# Patient Record
Sex: MALE | Race: WHITE | NOT HISPANIC OR LATINO | URBAN - METROPOLITAN AREA
[De-identification: names, ages, dates, MRNs, and addresses within clinical notes are randomized per-mention and may not be internally consistent; named-entity substitution may affect disease eponyms.]

---

## 2018-03-13 ENCOUNTER — INPATIENT (INPATIENT)
Facility: HOSPITAL | Age: 83
LOS: 9 days | Discharge: HOME | DRG: 222 | End: 2018-03-23
Attending: INTERNAL MEDICINE | Admitting: STUDENT IN AN ORGANIZED HEALTH CARE EDUCATION/TRAINING PROGRAM
Payer: MEDICARE

## 2018-03-13 VITALS
HEART RATE: 54 BPM | OXYGEN SATURATION: 95 % | WEIGHT: 169.09 LBS | RESPIRATION RATE: 29 BRPM | TEMPERATURE: 98 F | SYSTOLIC BLOOD PRESSURE: 198 MMHG | DIASTOLIC BLOOD PRESSURE: 63 MMHG

## 2018-03-13 DIAGNOSIS — I50.9 HEART FAILURE, UNSPECIFIED: ICD-10-CM

## 2018-03-13 PROCEDURE — 71045 X-RAY EXAM CHEST 1 VIEW: CPT | Mod: 26

## 2018-03-13 PROCEDURE — 99223 1ST HOSP IP/OBS HIGH 75: CPT | Mod: GC

## 2018-03-13 RX ORDER — MAGNESIUM SULFATE 500 MG/ML
1 VIAL (ML) INJECTION ONCE
Qty: 0 | Refills: 0 | Status: COMPLETED | OUTPATIENT
Start: 2018-03-13 | End: 2018-03-14

## 2018-03-13 RX ORDER — FUROSEMIDE 40 MG
40 TABLET ORAL
Qty: 0 | Refills: 0 | Status: DISCONTINUED | OUTPATIENT
Start: 2018-03-13 | End: 2018-03-14

## 2018-03-13 RX ORDER — NITROGLYCERIN 6.5 MG
5 CAPSULE, EXTENDED RELEASE ORAL
Qty: 50 | Refills: 0 | Status: DISCONTINUED | OUTPATIENT
Start: 2018-03-13 | End: 2018-03-14

## 2018-03-13 NOTE — H&P ADULT - NSHPPHYSICALEXAM_GEN_ALL_CORE
ICU Vital Signs Last 24 Hrs  T(C): 36.6 (13 Mar 2018 23:05), Max: 36.6 (13 Mar 2018 23:05)  T(F): 97.9 (13 Mar 2018 23:05), Max: 97.9 (13 Mar 2018 23:05)  HR: 40 (14 Mar 2018 00:15) (40 - 54)  BP: 165/63 (14 Mar 2018 00:15) (165/63 - 198/63)  BP(mean): 102 (14 Mar 2018 00:15) (102 - 128)  ABP: --  ABP(mean): --  RR: 25 (14 Mar 2018 00:15) (25 - 29)  SpO2: 96% (14 Mar 2018 00:15) (95% - 97%)      PHYSICAL EXAM:    GENERAL: Comfortable, no acute distress, on BiPAP  HEAD:  Normocephalic, atraumatic  EYES: EOMI, PERRLA  HEENT: Moist mucous membranes  NECK: Supple, No JVD  NERVOUS SYSTEM:  Alert & Oriented X3, Motor Strength 5/5 B/L upper and lower extremities  CHEST/LUNG: Bilateral crackles   HEART: Bradycardic, + systolic murmur   ABDOMEN: Soft, Nontender, Nondistended, Bowel sounds present  EXTREMITIES:   No clubbing, cyanosis, + 2+ LE edema  MUSCULOSKELETAL: No muscle tenderness, no joint tenderness  SKIN:  warm and dry, no rash

## 2018-03-13 NOTE — H&P ADULT - NSHPSOCIALHISTORY_GEN_ALL_CORE
Social History:    Marital Status:  (  X )    (   ) Single    (   )    (  )   Lives with: (  ) alone  (  ) children   ( X ) spouse   (  ) parents  (  ) other    Substance Use (street drugs): ( X ) never used  (  ) other:  Tobacco Usage:  (   ) never smoked   (  X ) former smoker- >40 pack years, quit 12 years ago   Alcohol Usage: Social drinker

## 2018-03-13 NOTE — H&P ADULT - ASSESSMENT
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     #Neuro- A&Ox3, Nigerian speaking only     #CV- Initially hypertensive to SBP 200s, on nitro gtt   - sustained bradycardia in 30s-40s in atrial flutter with junctional escape rhythm,   - EP eval for PPM, no need for TVP at this time   - volume overloaded, improve s/p fernández placement and lasix, diuresed 1L in ambulance, put on lasix 40mg BID  - Jose Manuel mildly elevated likely 2/2 demand ischemia, will cont to trend     #Pulm- Hypoxic respiratory failure in setting of pulmonary edema 2/2 ADHF  - on BiPAP  - given 125mg solumedrol in ED    #GI- NPO while on BiPAP     #Renal- Cr 1.48 w/ unknown baseline, possible obstruction that improved w/ fernández placement vs cardio-renal   - cont to monitor     #Electrolytes- Gave 1gr mag on admission 2/2 possible torsades in transit, otherwise stable  - cont to check electrolytes Q8 while diuresing     #ID- WBC 13, but signs of active infection at this time   - will cont to monitor off abx for now     #Endocrine- DM2, put on ISS    #PPx- HSQ 83 y/o M w/ PMHx of DM2, HTN, R sided endarterectomy (7/2017) c/p bleed, 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     #Neuro- A&Ox3, Bhutanese speaking only     #CV- Initially hypertensive to SBP 200s, on nitro gtt   - sustained bradycardia in 30s-40s in atrial flutter with junctional escape rhythm,   - EP eval for PPM, no need for TVP at this time   - volume overloaded, improve s/p fernández placement and lasix, diuresed 1L in ambulance, put on lasix 40mg BID  - Jose Manuel mildly elevated likely 2/2 demand ischemia, will cont to trend     #Pulm- Hypoxic respiratory failure in setting of pulmonary edema 2/2 ADHF  - on BiPAP  - given 125mg solumedrol in ED    #GI- NPO while on BiPAP     #Renal- Cr 1.48 w/ unknown baseline, possible obstruction that improved w/ fernández placement vs cardio-renal   - cont to monitor     #Electrolytes- Gave 1gr mag on admission 2/2 possible torsades in transit, otherwise stable  - cont to check electrolytes Q8 while diuresing     #ID- WBC 13, but signs of active infection at this time   - will cont to monitor off abx for now     #Endocrine- DM2, put on ISS    #Glaucoma- c/w eye drops, holding timolol gtts 2/2 bradycardia, Lotemax gtts unavailable can bring from home     #PPx- HSQ 81 y/o M w/ PMHx of DM2, HTN, R sided endarterectomy (7/2017) c/p bleed, 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 for ADHF and PPM evaluation for atrial flutter with junctional escape rhythm.     #Neuro- A&Ox3, Swazi speaking only     #CV- Initially hypertensive to SBP 200s, on nitro gtt   - sustained bradycardia in 30s-40s in atrial flutter with junctional escape rhythm,   - EP eval for PPM, no need for TVP at this time   - volume overloaded, improve s/p fernández placement and lasix, diuresed 1L in ambulance, gave additional 60mg tonight then put on lasix 40mg BID  - Jose Manuel mildly elevated likely 2/2 demand ischemia, will cont to trend   - holding home PO meds while on BiPAP    #Pulm- Hypoxic respiratory failure in setting of pulmonary edema 2/2 ADHF  - on BiPAP  - given 125mg solumedrol in ED    #GI- NPO while on BiPAP     #Renal- Cr 1.48 w/ unknown baseline, possible obstruction that improved w/ fernández placement vs cardio-renal   - cont to monitor     #Electrolytes- Gave 1gr mag on admission 2/2 possible torsades in transit, otherwise stable  - cont to check electrolytes Q8 while diuresing     #ID- WBC 13, but signs of active infection at this time   - will cont to monitor off abx for now     #Endocrine- DM2, put on ISS    #Glaucoma- c/w eye drops, holding timolol gtts 2/2 bradycardia, Lotemax gtts unavailable can bring from home     #PPx- HSQ

## 2018-03-13 NOTE — H&P ADULT - PMH
Atrial flutter, unspecified type    Essential hypertension    Type 2 diabetes mellitus without complication, without long-term current use of insulin

## 2018-03-13 NOTE — H&P ADULT - NSHPLABSRESULTS_GEN_ALL_CORE
10.7   13.2  )-----------( 355      ( 13 Mar 2018 23:50 )             33.9       03-13    142  |  106  |  28<H>  ----------------------------<  194<H>  4.4   |  21<L>  |  1.48<H>    Ca    9.4      13 Mar 2018 23:50  Phos  4.7     03-13  Mg     2.0     03-13    TPro  8.0  /  Alb  3.8  /  TBili  0.5  /  DBili  x   /  AST  20  /  ALT  19  /  AlkPhos  157<H>  03-13          PT/INR - ( 13 Mar 2018 23:50 )   PT: 13.3 sec;   INR: 1.23 ratio         PTT - ( 13 Mar 2018 23:50 )  PTT:33.9 sec    Lactate Trend  03-13 @ 23:50 Lactate:1.3       CARDIAC MARKERS ( 13 Mar 2018 23:50 )  x     / 0.10 ng/mL / 38 U/L / x     / 3.1 ng/mL      EKG- 10.7   13.2  )-----------( 355      ( 13 Mar 2018 23:50 )             33.9       03-13    142  |  106  |  28<H>  ----------------------------<  194<H>  4.4   |  21<L>  |  1.48<H>    Ca    9.4      13 Mar 2018 23:50  Phos  4.7     03-13  Mg     2.0     03-13    TPro  8.0  /  Alb  3.8  /  TBili  0.5  /  DBili  x   /  AST  20  /  ALT  19  /  AlkPhos  157<H>  03-13          PT/INR - ( 13 Mar 2018 23:50 )   PT: 13.3 sec;   INR: 1.23 ratio         PTT - ( 13 Mar 2018 23:50 )  PTT:33.9 sec    Lactate Trend  03-13 @ 23:50 Lactate:1.3       CARDIAC MARKERS ( 13 Mar 2018 23:50 )  x     / 0.10 ng/mL / 38 U/L / x     / 3.1 ng/mL      EKG- atrial flutter with junctional escape rhythm, HR 50, nl axis, occasional PVCs

## 2018-03-13 NOTE — H&P ADULT - HISTORY OF PRESENT ILLNESS
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   Patient was placed on BiPAP at OSH w/ ABG showing PaO2 80 on CPAP 100%.   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     Patient was placed on BiPAP at OSH w/ ABG showing PaO2 80 on CPAP 100%.   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   Wife at bedside provided the history.   Patient was very independent and in a good state of health until he had the carotid endarterectomy in July last year which was complicated by some type of bleeding resulting in a month long hospitalization at [place]. Since the event he has had about 2-3 hospitalizations from other complications followed by rehab stays.  More recently, the patient has been doing better and living at home with his wife. Wife reports he is able to walk around the house without difficulty and mainly only needs help changing.   This morning, he woke up feeling very short of breath so they called EMS and he was brought to [facility]  Patient was placed on BiPAP at OSH w/ ABG showing PaO2 80 on CPAP 100%.   Given Duoneb, lasix 60mg IV, solumedrol 125mg     Wife reports he denied any F/C, CP, N/V, abd pain, cough, headache or dizziness.   Patient has chronic LE edema since his prior hospitalizations.   Wife also reports a large amount of urine come out when they place a fernández in the ED.     Patient had persistent bradycardia and was transferred to [facility] for possible PPM.     4/2017- stress test neg for ischemia, at that time in NSR w/ RBBB  3/2017- echo showed normal LV function w/ moderate concentric hypertrophy

## 2018-03-14 DIAGNOSIS — Z98.890 OTHER SPECIFIED POSTPROCEDURAL STATES: Chronic | ICD-10-CM

## 2018-03-14 DIAGNOSIS — I44.2 ATRIOVENTRICULAR BLOCK, COMPLETE: ICD-10-CM

## 2018-03-14 DIAGNOSIS — I48.92 UNSPECIFIED ATRIAL FLUTTER: ICD-10-CM

## 2018-03-14 DIAGNOSIS — K63.5 POLYP OF COLON: Chronic | ICD-10-CM

## 2018-03-14 DIAGNOSIS — I47.2 VENTRICULAR TACHYCARDIA: ICD-10-CM

## 2018-03-14 LAB
ALBUMIN SERPL ELPH-MCNC: 3.2 G/DL — LOW (ref 3.3–5)
ALBUMIN SERPL ELPH-MCNC: 3.8 G/DL — SIGNIFICANT CHANGE UP (ref 3.3–5)
ALP SERPL-CCNC: 140 U/L — HIGH (ref 40–120)
ALP SERPL-CCNC: 157 U/L — HIGH (ref 40–120)
ALT FLD-CCNC: 15 U/L RC — SIGNIFICANT CHANGE UP (ref 10–45)
ALT FLD-CCNC: 19 U/L RC — SIGNIFICANT CHANGE UP (ref 10–45)
ANION GAP SERPL CALC-SCNC: 15 MMOL/L — SIGNIFICANT CHANGE UP (ref 5–17)
ANION GAP SERPL CALC-SCNC: 18 MMOL/L — HIGH (ref 5–17)
ANION GAP SERPL CALC-SCNC: 19 MMOL/L — HIGH (ref 5–17)
APTT BLD: 33.9 SEC — SIGNIFICANT CHANGE UP (ref 27.5–37.4)
AST SERPL-CCNC: 15 U/L — SIGNIFICANT CHANGE UP (ref 10–40)
AST SERPL-CCNC: 20 U/L — SIGNIFICANT CHANGE UP (ref 10–40)
BASOPHILS # BLD AUTO: 0 K/UL — SIGNIFICANT CHANGE UP (ref 0–0.2)
BASOPHILS # BLD AUTO: 0 K/UL — SIGNIFICANT CHANGE UP (ref 0–0.2)
BASOPHILS NFR BLD AUTO: 0.1 % — SIGNIFICANT CHANGE UP (ref 0–2)
BASOPHILS NFR BLD AUTO: 0.2 % — SIGNIFICANT CHANGE UP (ref 0–2)
BILIRUB SERPL-MCNC: 0.4 MG/DL — SIGNIFICANT CHANGE UP (ref 0.2–1.2)
BILIRUB SERPL-MCNC: 0.5 MG/DL — SIGNIFICANT CHANGE UP (ref 0.2–1.2)
BLD GP AB SCN SERPL QL: POSITIVE — SIGNIFICANT CHANGE UP
BLD GP AB SCN SERPL QL: POSITIVE — SIGNIFICANT CHANGE UP
BUN SERPL-MCNC: 28 MG/DL — HIGH (ref 7–23)
BUN SERPL-MCNC: 32 MG/DL — HIGH (ref 7–23)
BUN SERPL-MCNC: 39 MG/DL — HIGH (ref 7–23)
CALCIUM SERPL-MCNC: 9.1 MG/DL — SIGNIFICANT CHANGE UP (ref 8.4–10.5)
CALCIUM SERPL-MCNC: 9.1 MG/DL — SIGNIFICANT CHANGE UP (ref 8.4–10.5)
CALCIUM SERPL-MCNC: 9.4 MG/DL — SIGNIFICANT CHANGE UP (ref 8.4–10.5)
CHLORIDE SERPL-SCNC: 104 MMOL/L — SIGNIFICANT CHANGE UP (ref 96–108)
CHLORIDE SERPL-SCNC: 106 MMOL/L — SIGNIFICANT CHANGE UP (ref 96–108)
CHLORIDE SERPL-SCNC: 107 MMOL/L — SIGNIFICANT CHANGE UP (ref 96–108)
CHOLEST SERPL-MCNC: 94 MG/DL — SIGNIFICANT CHANGE UP (ref 10–199)
CK MB CFR SERPL CALC: 3.1 NG/ML — SIGNIFICANT CHANGE UP (ref 0–6.7)
CK SERPL-CCNC: 38 U/L — SIGNIFICANT CHANGE UP (ref 30–200)
CO2 SERPL-SCNC: 18 MMOL/L — LOW (ref 22–31)
CO2 SERPL-SCNC: 18 MMOL/L — LOW (ref 22–31)
CO2 SERPL-SCNC: 21 MMOL/L — LOW (ref 22–31)
CREAT SERPL-MCNC: 1.48 MG/DL — HIGH (ref 0.5–1.3)
CREAT SERPL-MCNC: 1.58 MG/DL — HIGH (ref 0.5–1.3)
CREAT SERPL-MCNC: 1.61 MG/DL — HIGH (ref 0.5–1.3)
EOSINOPHIL # BLD AUTO: 0 K/UL — SIGNIFICANT CHANGE UP (ref 0–0.5)
EOSINOPHIL # BLD AUTO: 0 K/UL — SIGNIFICANT CHANGE UP (ref 0–0.5)
EOSINOPHIL NFR BLD AUTO: 0 % — SIGNIFICANT CHANGE UP (ref 0–6)
EOSINOPHIL NFR BLD AUTO: 0.3 % — SIGNIFICANT CHANGE UP (ref 0–6)
GAS PNL BLDA: SIGNIFICANT CHANGE UP
GAS PNL BLDA: SIGNIFICANT CHANGE UP
GLUCOSE BLDC GLUCOMTR-MCNC: 188 MG/DL — HIGH (ref 70–99)
GLUCOSE BLDC GLUCOMTR-MCNC: 220 MG/DL — HIGH (ref 70–99)
GLUCOSE SERPL-MCNC: 194 MG/DL — HIGH (ref 70–99)
GLUCOSE SERPL-MCNC: 212 MG/DL — HIGH (ref 70–99)
GLUCOSE SERPL-MCNC: 236 MG/DL — HIGH (ref 70–99)
HBA1C BLD-MCNC: 5.9 % — HIGH (ref 4–5.6)
HCT VFR BLD CALC: 33.4 % — LOW (ref 39–50)
HCT VFR BLD CALC: 33.9 % — LOW (ref 39–50)
HDLC SERPL-MCNC: 39 MG/DL — LOW (ref 40–125)
HGB BLD-MCNC: 10.7 G/DL — LOW (ref 13–17)
HGB BLD-MCNC: 10.9 G/DL — LOW (ref 13–17)
INR BLD: 1.23 RATIO — HIGH (ref 0.88–1.16)
LACTATE SERPL-SCNC: 1.3 MMOL/L — SIGNIFICANT CHANGE UP (ref 0.7–2)
LIPID PNL WITH DIRECT LDL SERPL: 42 MG/DL — SIGNIFICANT CHANGE UP
LYMPHOCYTES # BLD AUTO: 0.9 K/UL — LOW (ref 1–3.3)
LYMPHOCYTES # BLD AUTO: 1.1 K/UL — SIGNIFICANT CHANGE UP (ref 1–3.3)
LYMPHOCYTES # BLD AUTO: 6.6 % — LOW (ref 13–44)
LYMPHOCYTES # BLD AUTO: 9.6 % — LOW (ref 13–44)
MAGNESIUM SERPL-MCNC: 2 MG/DL — SIGNIFICANT CHANGE UP (ref 1.6–2.6)
MAGNESIUM SERPL-MCNC: 2.2 MG/DL — SIGNIFICANT CHANGE UP (ref 1.6–2.6)
MAGNESIUM SERPL-MCNC: 2.3 MG/DL — SIGNIFICANT CHANGE UP (ref 1.6–2.6)
MCHC RBC-ENTMCNC: 26.9 PG — LOW (ref 27–34)
MCHC RBC-ENTMCNC: 27.9 PG — SIGNIFICANT CHANGE UP (ref 27–34)
MCHC RBC-ENTMCNC: 31.5 GM/DL — LOW (ref 32–36)
MCHC RBC-ENTMCNC: 32.8 GM/DL — SIGNIFICANT CHANGE UP (ref 32–36)
MCV RBC AUTO: 85.2 FL — SIGNIFICANT CHANGE UP (ref 80–100)
MCV RBC AUTO: 85.3 FL — SIGNIFICANT CHANGE UP (ref 80–100)
MONOCYTES # BLD AUTO: 0.2 K/UL — SIGNIFICANT CHANGE UP (ref 0–0.9)
MONOCYTES # BLD AUTO: 0.2 K/UL — SIGNIFICANT CHANGE UP (ref 0–0.9)
MONOCYTES NFR BLD AUTO: 1.6 % — LOW (ref 2–14)
MONOCYTES NFR BLD AUTO: 1.9 % — LOW (ref 2–14)
NEUTROPHILS # BLD AUTO: 12 K/UL — HIGH (ref 1.8–7.4)
NEUTROPHILS # BLD AUTO: 9.8 K/UL — HIGH (ref 1.8–7.4)
NEUTROPHILS NFR BLD AUTO: 88.3 % — HIGH (ref 43–77)
NEUTROPHILS NFR BLD AUTO: 91.4 % — HIGH (ref 43–77)
NT-PROBNP SERPL-SCNC: 8615 PG/ML — HIGH (ref 0–300)
PHOSPHATE SERPL-MCNC: 4.7 MG/DL — HIGH (ref 2.5–4.5)
PHOSPHATE SERPL-MCNC: 6.1 MG/DL — HIGH (ref 2.5–4.5)
PLATELET # BLD AUTO: 355 K/UL — SIGNIFICANT CHANGE UP (ref 150–400)
PLATELET # BLD AUTO: 415 K/UL — HIGH (ref 150–400)
POTASSIUM SERPL-MCNC: 4.2 MMOL/L — SIGNIFICANT CHANGE UP (ref 3.5–5.3)
POTASSIUM SERPL-MCNC: 4.4 MMOL/L — SIGNIFICANT CHANGE UP (ref 3.5–5.3)
POTASSIUM SERPL-MCNC: 4.7 MMOL/L — SIGNIFICANT CHANGE UP (ref 3.5–5.3)
POTASSIUM SERPL-SCNC: 4.2 MMOL/L — SIGNIFICANT CHANGE UP (ref 3.5–5.3)
POTASSIUM SERPL-SCNC: 4.4 MMOL/L — SIGNIFICANT CHANGE UP (ref 3.5–5.3)
POTASSIUM SERPL-SCNC: 4.7 MMOL/L — SIGNIFICANT CHANGE UP (ref 3.5–5.3)
PROT SERPL-MCNC: 7.2 G/DL — SIGNIFICANT CHANGE UP (ref 6–8.3)
PROT SERPL-MCNC: 8 G/DL — SIGNIFICANT CHANGE UP (ref 6–8.3)
PROTHROM AB SERPL-ACNC: 13.3 SEC — HIGH (ref 9.8–12.7)
RBC # BLD: 3.91 M/UL — LOW (ref 4.2–5.8)
RBC # BLD: 3.98 M/UL — LOW (ref 4.2–5.8)
RBC # FLD: 16.2 % — HIGH (ref 10.3–14.5)
RBC # FLD: 16.2 % — HIGH (ref 10.3–14.5)
RH IG SCN BLD-IMP: POSITIVE — SIGNIFICANT CHANGE UP
RH IG SCN BLD-IMP: POSITIVE — SIGNIFICANT CHANGE UP
SODIUM SERPL-SCNC: 141 MMOL/L — SIGNIFICANT CHANGE UP (ref 135–145)
SODIUM SERPL-SCNC: 142 MMOL/L — SIGNIFICANT CHANGE UP (ref 135–145)
SODIUM SERPL-SCNC: 143 MMOL/L — SIGNIFICANT CHANGE UP (ref 135–145)
TOTAL CHOLESTEROL/HDL RATIO MEASUREMENT: 2.4 RATIO — LOW (ref 3.4–9.6)
TRIGL SERPL-MCNC: 63 MG/DL — SIGNIFICANT CHANGE UP (ref 10–149)
TROPONIN T SERPL-MCNC: 0.1 NG/ML — HIGH (ref 0–0.06)
TSH SERPL-MCNC: 1.05 UIU/ML — SIGNIFICANT CHANGE UP (ref 0.27–4.2)
WBC # BLD: 11.1 K/UL — HIGH (ref 3.8–10.5)
WBC # BLD: 13.2 K/UL — HIGH (ref 3.8–10.5)
WBC # FLD AUTO: 11.1 K/UL — HIGH (ref 3.8–10.5)
WBC # FLD AUTO: 13.2 K/UL — HIGH (ref 3.8–10.5)

## 2018-03-14 PROCEDURE — 99233 SBSQ HOSP IP/OBS HIGH 50: CPT | Mod: GC

## 2018-03-14 PROCEDURE — 93010 ELECTROCARDIOGRAM REPORT: CPT

## 2018-03-14 PROCEDURE — 71045 X-RAY EXAM CHEST 1 VIEW: CPT | Mod: 26

## 2018-03-14 PROCEDURE — 93306 TTE W/DOPPLER COMPLETE: CPT | Mod: 26

## 2018-03-14 RX ORDER — GLUCAGON INJECTION, SOLUTION 0.5 MG/.1ML
1 INJECTION, SOLUTION SUBCUTANEOUS ONCE
Qty: 0 | Refills: 0 | Status: DISCONTINUED | OUTPATIENT
Start: 2018-03-14 | End: 2018-03-23

## 2018-03-14 RX ORDER — CILOSTAZOL 100 MG/1
100 TABLET ORAL
Qty: 0 | Refills: 0 | Status: DISCONTINUED | OUTPATIENT
Start: 2018-03-14 | End: 2018-03-14

## 2018-03-14 RX ORDER — DEXTROSE 50 % IN WATER 50 %
1 SYRINGE (ML) INTRAVENOUS ONCE
Qty: 0 | Refills: 0 | Status: DISCONTINUED | OUTPATIENT
Start: 2018-03-14 | End: 2018-03-23

## 2018-03-14 RX ORDER — DORZOLAMIDE HYDROCHLORIDE 20 MG/ML
1 SOLUTION/ DROPS OPHTHALMIC THREE TIMES A DAY
Qty: 0 | Refills: 0 | Status: DISCONTINUED | OUTPATIENT
Start: 2018-03-14 | End: 2018-03-14

## 2018-03-14 RX ORDER — BRIMONIDINE TARTRATE 2 MG/MG
1 SOLUTION/ DROPS OPHTHALMIC THREE TIMES A DAY
Qty: 0 | Refills: 0 | Status: DISCONTINUED | OUTPATIENT
Start: 2018-03-14 | End: 2018-03-14

## 2018-03-14 RX ORDER — INSULIN LISPRO 100/ML
VIAL (ML) SUBCUTANEOUS EVERY 6 HOURS
Qty: 0 | Refills: 0 | Status: DISCONTINUED | OUTPATIENT
Start: 2018-03-14 | End: 2018-03-14

## 2018-03-14 RX ORDER — HEPARIN SODIUM 5000 [USP'U]/ML
5000 INJECTION INTRAVENOUS; SUBCUTANEOUS EVERY 12 HOURS
Qty: 0 | Refills: 0 | Status: DISCONTINUED | OUTPATIENT
Start: 2018-03-14 | End: 2018-03-14

## 2018-03-14 RX ORDER — IPRATROPIUM/ALBUTEROL SULFATE 18-103MCG
3 AEROSOL WITH ADAPTER (GRAM) INHALATION ONCE
Qty: 0 | Refills: 0 | Status: COMPLETED | OUTPATIENT
Start: 2018-03-14 | End: 2018-03-14

## 2018-03-14 RX ORDER — BACITRACIN 500 [USP'U]/G
1 OINTMENT OPHTHALMIC DAILY
Qty: 0 | Refills: 0 | Status: DISCONTINUED | OUTPATIENT
Start: 2018-03-14 | End: 2018-03-23

## 2018-03-14 RX ORDER — INSULIN LISPRO 100/ML
VIAL (ML) SUBCUTANEOUS
Qty: 0 | Refills: 0 | Status: DISCONTINUED | OUTPATIENT
Start: 2018-03-14 | End: 2018-03-23

## 2018-03-14 RX ORDER — DEXTROSE 50 % IN WATER 50 %
12.5 SYRINGE (ML) INTRAVENOUS ONCE
Qty: 0 | Refills: 0 | Status: DISCONTINUED | OUTPATIENT
Start: 2018-03-14 | End: 2018-03-23

## 2018-03-14 RX ORDER — SODIUM CHLORIDE 9 MG/ML
1000 INJECTION, SOLUTION INTRAVENOUS
Qty: 0 | Refills: 0 | Status: DISCONTINUED | OUTPATIENT
Start: 2018-03-14 | End: 2018-03-23

## 2018-03-14 RX ORDER — DEXTROSE 50 % IN WATER 50 %
25 SYRINGE (ML) INTRAVENOUS ONCE
Qty: 0 | Refills: 0 | Status: DISCONTINUED | OUTPATIENT
Start: 2018-03-14 | End: 2018-03-23

## 2018-03-14 RX ORDER — DORZOLAMIDE HYDROCHLORIDE 20 MG/ML
1 SOLUTION/ DROPS OPHTHALMIC
Qty: 0 | Refills: 0 | Status: DISCONTINUED | OUTPATIENT
Start: 2018-03-14 | End: 2018-03-15

## 2018-03-14 RX ORDER — FUROSEMIDE 40 MG
40 TABLET ORAL
Qty: 0 | Refills: 0 | Status: DISCONTINUED | OUTPATIENT
Start: 2018-03-14 | End: 2018-03-19

## 2018-03-14 RX ORDER — CLOPIDOGREL BISULFATE 75 MG/1
75 TABLET, FILM COATED ORAL DAILY
Qty: 0 | Refills: 0 | Status: DISCONTINUED | OUTPATIENT
Start: 2018-03-14 | End: 2018-03-14

## 2018-03-14 RX ORDER — TAMSULOSIN HYDROCHLORIDE 0.4 MG/1
0.4 CAPSULE ORAL DAILY
Qty: 0 | Refills: 0 | Status: DISCONTINUED | OUTPATIENT
Start: 2018-03-14 | End: 2018-03-23

## 2018-03-14 RX ORDER — ATORVASTATIN CALCIUM 80 MG/1
20 TABLET, FILM COATED ORAL AT BEDTIME
Qty: 0 | Refills: 0 | Status: DISCONTINUED | OUTPATIENT
Start: 2018-03-14 | End: 2018-03-16

## 2018-03-14 RX ORDER — GLUCAGON INJECTION, SOLUTION 0.5 MG/.1ML
1 INJECTION, SOLUTION SUBCUTANEOUS ONCE
Qty: 0 | Refills: 0 | Status: DISCONTINUED | OUTPATIENT
Start: 2018-03-14 | End: 2018-03-14

## 2018-03-14 RX ORDER — IPRATROPIUM/ALBUTEROL SULFATE 18-103MCG
3 AEROSOL WITH ADAPTER (GRAM) INHALATION EVERY 6 HOURS
Qty: 0 | Refills: 0 | Status: DISCONTINUED | OUTPATIENT
Start: 2018-03-14 | End: 2018-03-23

## 2018-03-14 RX ORDER — FUROSEMIDE 40 MG
60 TABLET ORAL ONCE
Qty: 0 | Refills: 0 | Status: COMPLETED | OUTPATIENT
Start: 2018-03-14 | End: 2018-03-14

## 2018-03-14 RX ORDER — INSULIN LISPRO 100/ML
VIAL (ML) SUBCUTANEOUS AT BEDTIME
Qty: 0 | Refills: 0 | Status: DISCONTINUED | OUTPATIENT
Start: 2018-03-14 | End: 2018-03-23

## 2018-03-14 RX ORDER — DEXTROSE 50 % IN WATER 50 %
1 SYRINGE (ML) INTRAVENOUS ONCE
Qty: 0 | Refills: 0 | Status: DISCONTINUED | OUTPATIENT
Start: 2018-03-14 | End: 2018-03-14

## 2018-03-14 RX ORDER — DEXTROSE 50 % IN WATER 50 %
12.5 SYRINGE (ML) INTRAVENOUS ONCE
Qty: 0 | Refills: 0 | Status: DISCONTINUED | OUTPATIENT
Start: 2018-03-14 | End: 2018-03-14

## 2018-03-14 RX ORDER — SODIUM CHLORIDE 9 MG/ML
1000 INJECTION, SOLUTION INTRAVENOUS
Qty: 0 | Refills: 0 | Status: DISCONTINUED | OUTPATIENT
Start: 2018-03-14 | End: 2018-03-14

## 2018-03-14 RX ORDER — DEXTROSE 50 % IN WATER 50 %
25 SYRINGE (ML) INTRAVENOUS ONCE
Qty: 0 | Refills: 0 | Status: DISCONTINUED | OUTPATIENT
Start: 2018-03-14 | End: 2018-03-14

## 2018-03-14 RX ORDER — BRIMONIDINE TARTRATE 2 MG/MG
1 SOLUTION/ DROPS OPHTHALMIC
Qty: 0 | Refills: 0 | Status: DISCONTINUED | OUTPATIENT
Start: 2018-03-14 | End: 2018-03-23

## 2018-03-14 RX ORDER — CARVEDILOL PHOSPHATE 80 MG/1
1 CAPSULE, EXTENDED RELEASE ORAL
Qty: 0 | Refills: 0 | COMMUNITY

## 2018-03-14 RX ORDER — LOTEPREDNOL ETABONATE 2 MG/ML
1 SUSPENSION/ DROPS OPHTHALMIC
Qty: 0 | Refills: 0 | Status: DISCONTINUED | OUTPATIENT
Start: 2018-03-14 | End: 2018-03-23

## 2018-03-14 RX ADMIN — Medication 100 GRAM(S): at 00:06

## 2018-03-14 RX ADMIN — LOTEPREDNOL ETABONATE 1 DROP(S): 2 SUSPENSION/ DROPS OPHTHALMIC at 17:11

## 2018-03-14 RX ADMIN — BRIMONIDINE TARTRATE 1 DROP(S): 2 SOLUTION/ DROPS OPHTHALMIC at 06:22

## 2018-03-14 RX ADMIN — BACITRACIN 1 APPLICATION(S): 500 OINTMENT OPHTHALMIC at 12:02

## 2018-03-14 RX ADMIN — HEPARIN SODIUM 5000 UNIT(S): 5000 INJECTION INTRAVENOUS; SUBCUTANEOUS at 05:42

## 2018-03-14 RX ADMIN — Medication 3 MILLILITER(S): at 06:23

## 2018-03-14 RX ADMIN — TAMSULOSIN HYDROCHLORIDE 0.4 MILLIGRAM(S): 0.4 CAPSULE ORAL at 12:44

## 2018-03-14 RX ADMIN — LOTEPREDNOL ETABONATE 1 DROP(S): 2 SUSPENSION/ DROPS OPHTHALMIC at 06:23

## 2018-03-14 RX ADMIN — Medication 1 DROP(S): at 05:57

## 2018-03-14 RX ADMIN — Medication 60 MILLIGRAM(S): at 01:55

## 2018-03-14 RX ADMIN — CILOSTAZOL 100 MILLIGRAM(S): 100 TABLET ORAL at 05:42

## 2018-03-14 RX ADMIN — Medication 40 MILLIGRAM(S): at 17:23

## 2018-03-14 RX ADMIN — Medication 1 DROP(S): at 12:45

## 2018-03-14 RX ADMIN — DORZOLAMIDE HYDROCHLORIDE 1 DROP(S): 20 SOLUTION/ DROPS OPHTHALMIC at 17:37

## 2018-03-14 RX ADMIN — BRIMONIDINE TARTRATE 1 DROP(S): 2 SOLUTION/ DROPS OPHTHALMIC at 17:15

## 2018-03-14 RX ADMIN — Medication 40 MILLIGRAM(S): at 06:23

## 2018-03-14 RX ADMIN — Medication 1.5 MICROGRAM(S)/MIN: at 00:57

## 2018-03-14 RX ADMIN — ATORVASTATIN CALCIUM 20 MILLIGRAM(S): 80 TABLET, FILM COATED ORAL at 21:02

## 2018-03-14 RX ADMIN — Medication 1 DROP(S): at 21:02

## 2018-03-14 RX ADMIN — DORZOLAMIDE HYDROCHLORIDE 1 DROP(S): 20 SOLUTION/ DROPS OPHTHALMIC at 05:57

## 2018-03-14 RX ADMIN — HEPARIN SODIUM 5000 UNIT(S): 5000 INJECTION INTRAVENOUS; SUBCUTANEOUS at 17:23

## 2018-03-14 NOTE — PROGRESS NOTE ADULT - ASSESSMENT
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     #Neuro- A&Ox3, Ugandan speaking only     #CV- Initially hypertensive to SBP 200s, on nitro gtt   - sustained bradycardia in 30s-40s in atrial flutter with junctional escape rhythm,   - EP eval for PPM, no need for TVP at this time   - volume overloaded, improve s/p fernández placement and lasix, diuresed 1L in ambulance, gave additional 60mg tonight then put on lasix 40mg BID  - Jose Manuel mildly elevated likely 2/2 demand ischemia, will cont to trend   - holding home PO meds while on BiPAP    #Pulm- Hypoxic respiratory failure in setting of pulmonary edema 2/2 ADHF  - on BiPAP  - given 125mg solumedrol in ED    #GI- NPO while on BiPAP     #Renal- Cr 1.48 w/ unknown baseline, possible obstruction that improved w/ fernández placement vs cardio-renal   - cont to monitor     #Electrolytes- Gave 1gr mag on admission 2/2 possible torsades in transit, otherwise stable  - cont to check electrolytes Q8 while diuresing     #ID- WBC 13, but signs of active infection at this time   - will cont to monitor off abx for now     #Endocrine- DM2, put on ISS    #Glaucoma- c/w eye drops, holding timolol gtts 2/2 bradycardia, Lotemax gtts unavailable can bring from home     #PPx- HSQ

## 2018-03-14 NOTE — CHART NOTE - NSCHARTNOTEFT_GEN_A_CORE
====================  CCU MIDNIGHT ROUNDS  ====================    MANI AYALA  [id number]  Patient is a 82y old  Male who presents with a chief complaint of Shortness of breath (13 Mar 2018 23:00)  ====================  SUMMARY:  ====================      ====================  NEW EVENTS:  ====================    MEDICATIONS  (STANDING):  artificial  tears Solution 1 Drop(s) Both EYES three times a day  atorvastatin 20 milliGRAM(s) Oral at bedtime  BACItracin   Ophthalmic Ointment 1 Application(s) Right EYE daily  brimonidine 0.2% Ophthalmic Solution 1 Drop(s) Right EYE two times a day  dextrose 5%. 1000 milliLiter(s) (50 mL/Hr) IV Continuous <Continuous>  dextrose 50% Injectable 12.5 Gram(s) IV Push once  dextrose 50% Injectable 25 Gram(s) IV Push once  dextrose 50% Injectable 25 Gram(s) IV Push once  dorzolamide 2% Ophthalmic Solution 1 Drop(s) Both EYES <User Schedule>  furosemide   Injectable 40 milliGRAM(s) IV Push two times a day  insulin lispro (HumaLOG) corrective regimen sliding scale   SubCutaneous at bedtime  insulin lispro (HumaLOG) corrective regimen sliding scale   SubCutaneous three times a day before meals  loteprednol 0.5% Ophthalmic Suspension 1 Drop(s) Both EYES two times a day  tamsulosin 0.4 milliGRAM(s) Oral daily    MEDICATIONS  (PRN):  ALBUTerol/ipratropium for Nebulization 3 milliLiter(s) Nebulizer every 6 hours PRN Shortness of Breath and/or Wheezing  dextrose Gel 1 Dose(s) Oral once PRN Blood Glucose LESS THAN 70 milliGRAM(s)/deciliter  glucagon  Injectable 1 milliGRAM(s) IntraMuscular once PRN Glucose LESS THAN 70 milligrams/deciliter    ====================  VITALS (Last 12 hrs):  ====================    T(C): 36.9 (03-14-18 @ 21:00), Max: 37.1 (03-14-18 @ 13:30)  T(F): 98.4 (03-14-18 @ 21:00), Max: 98.7 (03-14-18 @ 13:30)  HR: 40 (03-14-18 @ 22:00) (38 - 68)  BP: 164/52 (03-14-18 @ 22:00) (146/57 - 205/55)  BP(mean): 91 (03-14-18 @ 22:00) (71 - 129)  ABP: --  ABP(mean): --  RR: 24 (03-14-18 @ 22:00) (20 - 30)  SpO2: 94% (03-14-18 @ 22:00) (92% - 96%)  Wt(kg): --  CVP(mm Hg): --  CVP(cm H2O): --  CO: --  CI: --  PA: --  PA(mean): --  PCWP: --  SVR: --  PVR: --    I&O's Summary    13 Mar 2018 07:01  -  14 Mar 2018 07:00  --------------------------------------------------------  IN: 93 mL / OUT: 1615 mL / NET: -1522 mL    14 Mar 2018 07:01  -  14 Mar 2018 22:42  --------------------------------------------------------  IN: 840 mL / OUT: 1075 mL / NET: -235 mL    ====================  NEW LABS:  ====================    03-14    141  |  104  |  39<H>  ----------------------------<  236<H>  4.2   |  18<L>  |  1.58<H>    Ca    9.1      14 Mar 2018 12:34  Phos  6.1     03-14  Mg     2.2     03-14    TPro  7.2  /  Alb  3.2<L>  /  TBili  0.4  /  DBili  x   /  AST  15  /  ALT  15  /  AlkPhos  140<H>  03-14    PT/INR - ( 13 Mar 2018 23:50 )   PT: 13.3 sec;   INR: 1.23 ratio       PTT - ( 13 Mar 2018 23:50 )  PTT:33.9 sec  Troponin T, Serum: 0.10 ng/mL <H> (03-13-18 @ 23:50)  Creatine Kinase, Serum: 38 U/L (03-13-18 @ 23:50)    CKMB Units: 3.1 ng/mL (03-13 @ 23:50)    ABG - ( 14 Mar 2018 12:01 )  pH: 7.46  /  pCO2: 28    /  pO2: 123   / HCO3: 20    / Base Excess: -3.0  /  SaO2: 99        ====================  PLAN:  ====================  -       Mel Beltrán CCU NP  Beeper #[id number]  Spectra # [phone]

## 2018-03-14 NOTE — CHART NOTE - NSCHARTNOTEFT_GEN_A_CORE
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   Wife at bedside provided the history.   Patient was very independent and in a good state of health until he had the carotid endarterectomy in July last year which was complicated by some type of bleeding resulting in a month long hospitalization at [facility]. Since the event he has had about 2-3 hospitalizations from other complications followed by rehab stays.  More recently, the patient has been doing better and living at home with his wife. Wife reports he is able to walk around the house without difficulty and mainly only needs help changing.   This morning, he woke up feeling very short of breath so they called EMS and he was brought to [facility]  Patient was placed on BiPAP at OSH w/ ABG showing PaO2 80 on CPAP 100%.   Given Duoneb, lasix 60mg IV, solumedrol 125mg     Wife reports he denied any F/C, CP, N/V, abd pain, cough, headache or dizziness.   Patient has chronic LE edema since his prior hospitalizations.   Wife also reports a large amount of urine come out when they place a fernández in the ED.     Patient had persistent bradycardia and was transferred to [facility] for possible PPM.     4/2017- stress test neg for ischemia, at that time in NSR w/ RBBB  3/2017- echo showed normal LV function w/ moderate concentric hypertrophy (13 Mar 2018 23:00)    ====================        ====================  NEW EVENTS:  EF preserved on TTE. Plan for PPM tomorrow. NPO after midnight.   Neg 1.5L the first night   ====================        ====================  VITALS (Last 12 hrs):  ====================    T(C): 36.9 (03-14-18 @ 21:00), Max: 37.1 (03-14-18 @ 13:30)  HR: 58 (03-14-18 @ 21:00) (38 - 68)  BP: 170/56 (03-14-18 @ 21:00) (146/57 - 205/55)  BP(mean): 104 (03-14-18 @ 21:00) (71 - 129)  ABP: --  ABP(mean): --  RR: 24 (03-14-18 @ 21:00) (20 - 30)  SpO2: 95% (03-14-18 @ 21:00) (92% - 96%)    TELEMETRY:        *BLOOD GAS/ARTERIAL/MIXED/VENOUS  *LACTATE    I&O's Summary    13 Mar 2018 07:01  -  14 Mar 2018 07:00  --------------------------------------------------------  IN: 93 mL / OUT: 1615 mL / NET: -1522 mL    14 Mar 2018 07:01  -  14 Mar 2018 22:04  --------------------------------------------------------  IN: 840 mL / OUT: 1075 mL / NET: -235 mL        ====================  PLAN:  #Neuro- A&Ox3, Bruneian speaking   #CV- Diuresing well, continued bradycardia but HD stable, plan for PPM tomorrow  #Pulm- On NC, breathing comfortably, pulm edema improving   #Renal- Cr stable   #Electrolytes- Stable, monitoring BMPs  #Endocrine- ISS    Stas Santos MD PGY2  [phone] / [id number]   ====================    HEALTH ISSUES - PROBLEM Dx:  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

## 2018-03-14 NOTE — CONSULT NOTE ADULT - SUBJECTIVE AND OBJECTIVE BOX
CHIEF COMPLAINT: 82 year male with shortness of breath, found to be in sustained atrial flutter with complete heart block, episodes of polymorphic VT .      PAST MEDICAL & SURGICAL HISTORY:  Atrial flutter, unspecified type  Type 2 diabetes mellitus without complication, without long-term current use of insulin  Essential hypertension  Polyp of colon, unspecified part of colon, unspecified type  History of carotid endarterectomy      HPI: 82 year male with history of HTN DM hyperlipidemia,  post  carotid endarterctomy, h/o GI bleed with ? resection of polyp. Followed by General cardiology Dr Aneudy Cabrera, and internist, Dr Grace Muller,  seen preoperative early  for carotid endarterectomy and at that time, echo and pharmacologic stress test were negative, subsequently had endarterectomy, complicated by pnuemonia, and had PAF at that time. 2017 had GI bleed and found to have colon polyp.  In 2018 he  presented to Dr Cabrera with palpitations,  holter showing NSR average heart rate 80s first degree AV block and PAF, placed on plavix only due to recent GI bleed. At baseline, patient has no chest pain nor shortness of breath, and can only walk one block due to bilateral leg pain.  No syncope or presyncope, occasional palpitations, maintained on Coreg 3.125 BID    The patient presented to [facility] yesterday with severe shortness of breath, found to have /80, heart rate 40-50. Found to be in sustained atrial flutter with complete heart block, with intermittent narrow and wide complex escape rhythm. Also noted to have up to 10-12 seconds of polymorphic VT. Was placed on Bipap, given lasix 60 IV and transferred urgently to [facility].    This AM , breathing more comfortably, with patient now on nasal cannula. Still in complete heart block,  Creatinine increased 1.4 to now 1.6 Hb stable 11, troponin 0.1 BNP in 8615. No further VT/VF                PREVIOUS DIAGNOSTIC TESTING:      ECHO  FINDINGS:    STRESS  FINDINGS:    CATHETERIZATION  FINDINGS:    ELECTROPHYSIOLOGY STUDY  FINDINGS:    CAROTID ULTRASOUND:  FINDINGS    VENOUS DUPLEX SCAN:  FINDINGS:    CHEST CT PULMONARY ANGIO with IV Contrast:  FINDINGS:  MEDICATIONS  (STANDING):  artificial  tears Solution 1 Drop(s) Both EYES three times a day  atorvastatin 20 milliGRAM(s) Oral at bedtime  BACItracin   Ophthalmic Ointment 1 Application(s) Right EYE daily  brimonidine 0.2% Ophthalmic Solution 1 Drop(s) Right EYE two times a day  cilostazol 100 milliGRAM(s) Oral two times a day  clopidogrel Tablet 75 milliGRAM(s) Oral daily  dorzolamide 2% Ophthalmic Solution 1 Drop(s) Both EYES <User Schedule>  furosemide   Injectable 40 milliGRAM(s) IV Push two times a day  heparin  Injectable 5000 Unit(s) SubCutaneous every 12 hours  loteprednol 0.5% Ophthalmic Suspension 1 Drop(s) Both EYES two times a day  tamsulosin 0.4 milliGRAM(s) Oral daily    MEDICATIONS  (PRN):  ALBUTerol/ipratropium for Nebulization 3 milliLiter(s) Nebulizer every 6 hours PRN Shortness of Breath and/or Wheezing      FAMILY HISTORY:  No pertinent family history in first degree relatives      SOCIAL HISTORY:    CIGARETTES:    ALCOHOL:    REVIEW OF SYSTEMS:    CONSTITUTIONAL: No fever, weight loss, chills, shakes, or fatigue  EYES: No eye pain, visual disturbances, or discharge  ENMT:  No difficulty hearing, tinnitus, vertigo; No sinus or throat pain  NECK: No pain or stiffness  BREASTS: No pain, masses, or nipple discharge  RESPIRATORY: No cough, wheezing, hemoptysis, or shortness of breath  CARDIOVASCULAR: No chest pain,  POSITIVE SHORTNESS OF BREATH, POSITIVE PALPITATIONS, no, dizziness, syncope, paroxysmal nocturnal dyspnea, orthopnea, or arm or leg swelling  GASTROINTESTINAL: No abdominal  or epigastric pain, nausea, vomiting, hematemesis, diarrhea, constipation, melena or bright red blood.  GENITOURINARY: No dysuria, nocturia, hematuria, or urinary incontinence  NEUROLOGICAL: No headaches, memory loss, slurred speech, limb weakness, loss of strength, numbness, or tremors  SKIN: No itching, burning, rashes, or lesions   LYMPH NODES: No enlarged glands  ENDOCRINE: No heat or cold intolerance, or hair loss  MUSCULOSKELETAL: No joint pain or swelling, muscle, back, or extremity pain  PSYCHIATRIC: No depression, anxiety, or difficulty sleeping  HEME/LYMPH: No easy bruising or bleeding gums  ALLERY AND IMMUNOLOGIC: No hives or rash.      Vital Signs Last 24 Hrs  T(C): 36.7 (14 Mar 2018 08:00), Max: 36.7 (14 Mar 2018 05:00)  T(F): 98.1 (14 Mar 2018 08:00), Max: 98.1 (14 Mar 2018 08:00)  HR: 44 (14 Mar 2018 09:00) (36 - 54)  BP: 170/60 (14 Mar 2018 09:00) (96/38 - 198/63)  BP(mean): 103 (14 Mar 2018 09:00) (61 - 659)  RR: 24 (14 Mar 2018 09:00) (13 - 30)  SpO2: 94% (14 Mar 2018 09:00) (91% - 99%)  Daily     Daily Weight in k.8 (14 Mar 2018 06:00)    -13 @ 07:01  -  - @ 07:00  --------------------------------------------------------  IN: 93 mL / OUT: 1615 mL / NET: -1522 mL          PHYSICAL EXAM:    GENERAL: In no apparent distress, well nourished, and hydrated.  HEAD:  Atraumatic, Normocephalic  EYES: EOMI, PERRLA, conjunctiva and sclera clear  ENMT: No tonsillar erythema, exudates, or enlargement; Moist mucous membranes, Good dentition, No lesions  NECK: Supple and normal thyroid.  No JVD or carotid bruit.  Carotid pulse is 2+ bilaterally.  HEART: Regular rate bradycardia; No murmurs, rubs, or gallops.  PULMONARY: Clear to auscultation and perfusion.  No rales, wheezing, or rhonchi bilaterally.  ABDOMEN: Soft, Nontender, Nondistended; Bowel sounds present  EXTREMITIES:  2+ Peripheral Pulses, No clubbing, cyanosis, or edema  LYMPH: No lymphadenopathy noted  NEUROLOGICAL: Grossly nonfocal          INTERPRETATION OF TELEMETRY:    ECG:    I&O's Detail    13 Mar 2018 07:01  -  14 Mar 2018 07:00  --------------------------------------------------------  IN:    nitroglycerin  Infusion: 33 mL    Oral Fluid: 60 mL  Total IN: 93 mL    OUT:    Indwelling Catheter - Urethral: 1615 mL  Total OUT: 1615 mL    Total NET: -1522 mL          LABS:                        10.9   11.1  )-----------( 415      ( 14 Mar 2018 05:14 )             33.4     -14    143  |  107  |  32<H>  ----------------------------<  212<H>  4.7   |  18<L>  |  1.61<H>    Ca    9.1      14 Mar 2018 05:14  Phos  6.1       Mg     2.3         TPro  8.0  /  Alb  3.8  /  TBili  0.5  /  DBili  x   /  AST  20  /  ALT  19  /  AlkPhos  157<H>      CARDIAC MARKERS ( 13 Mar 2018 23:50 )  x     / 0.10 ng/mL / 38 U/L / x     / 3.1 ng/mL      PT/INR - ( 13 Mar 2018 23:50 )   PT: 13.3 sec;   INR: 1.23 ratio         PTT - ( 13 Mar 2018 23:50 )  PTT:33.9 sec    BNPSerum Pro-Brain Natriuretic Peptide: 8615 pg/mL ( @ 23:50)    I&O's Detail    13 Mar 2018 07:01  -  14 Mar 2018 07:00  --------------------------------------------------------  IN:    nitroglycerin  Infusion: 33 mL    Oral Fluid: 60 mL  Total IN: 93 mL    OUT:    Indwelling Catheter - Urethral: 1615 mL  Total OUT: 1615 mL    Total NET: -1522 mL        Daily     Daily Weight in k.8 (14 Mar 2018 06:00)    RADIOLOGY & ADDITIONAL STUDIES:

## 2018-03-14 NOTE — PROGRESS NOTE ADULT - SUBJECTIVE AND OBJECTIVE BOX
Patient is a 82y old  Male who presents with a chief complaint of Shortness of breath (13 Mar 2018 23:00)    Event	  Overnight patient remained HD stable and had no further ectopic beats.   He was given an additional 60mg lasix due to decreased urine output.   O2 requirements came down from 50% on BiPAP to 30%  Denies any CP, N/V, F/C, abd pain, headache.       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   Wife at bedside provided the history.   Patient was very independent and in a good state of health until he had the carotid endarterectomy in July last year which was complicated by some type of bleeding resulting in a month long hospitalization at [facility]. Since the event he has had about 2-3 hospitalizations from other complications followed by rehab stays.  More recently, the patient has been doing better and living at home with his wife. Wife reports he is able to walk around the house without difficulty and mainly only needs help changing.   This morning, he woke up feeling very short of breath so they called EMS and he was brought to [facility]  Patient was placed on BiPAP at OSH w/ ABG showing PaO2 80 on CPAP 100%.   Given Duoneb, lasix 60mg IV, solumedrol 125mg     Wife reports he denied any F/C, CP, N/V, abd pain, cough, headache or dizziness.   Patient has chronic LE edema since his prior hospitalizations.   Wife also reports a large amount of urine come out when they place a fernández in the ED.     Patient had persistent bradycardia and was transferred to [facility] for possible PPM.     4/2017- stress test neg for ischemia, at that time in NSR w/ RBBB  3/2017- echo showed normal LV function w/ moderate concentric hypertrophy (13 Mar 2018 23:00)    MEDICATIONS  (STANDING):  artificial  tears Solution 1 Drop(s) Both EYES three times a day  atorvastatin 20 milliGRAM(s) Oral at bedtime  brimonidine 0.2% Ophthalmic Solution 1 Drop(s) Both EYES three times a day  cilostazol 100 milliGRAM(s) Oral two times a day  dextrose 5%. 1000 milliLiter(s) (50 mL/Hr) IV Continuous <Continuous>  dextrose 50% Injectable 12.5 Gram(s) IV Push once  dextrose 50% Injectable 25 Gram(s) IV Push once  dextrose 50% Injectable 25 Gram(s) IV Push once  dorzolamide 2% Ophthalmic Solution 1 Drop(s) Both EYES three times a day  furosemide   Injectable 40 milliGRAM(s) IV Push two times a day  heparin  Injectable 5000 Unit(s) SubCutaneous every 12 hours  insulin lispro (HumaLOG) corrective regimen sliding scale   SubCutaneous every 6 hours  tamsulosin 0.4 milliGRAM(s) Oral daily    MEDICATIONS  (PRN):  dextrose Gel 1 Dose(s) Oral once PRN Blood Glucose LESS THAN 70 milliGRAM(s)/deciliter  glucagon  Injectable 1 milliGRAM(s) IntraMuscular once PRN Glucose LESS THAN 70 milligrams/deciliter      REVIEW OF SYSTEMS:  Otherwise, 10 point ROS done and otherwise negative.    PHYSICAL EXAM:  Vital Signs Last 24 Hrs  T(C): 36.6 (13 Mar 2018 23:05), Max: 36.6 (13 Mar 2018 23:05)  T(F): 97.9 (13 Mar 2018 23:05), Max: 97.9 (13 Mar 2018 23:05)  HR: 40 (14 Mar 2018 05:00) (36 - 54)  BP: 170/79 (14 Mar 2018 05:00) (96/38 - 198/63)  BP(mean): 127 (14 Mar 2018 05:00) (61 - 659)  RR: 24 (14 Mar 2018 05:00) (13 - 29)  SpO2: 97% (14 Mar 2018 05:00) (94% - 99%)  I&O's Summary    13 Mar 2018 07:01  -  14 Mar 2018 05:21  --------------------------------------------------------  IN: 33 mL / OUT: 1565 mL / NET: -1532 mL        PHYSICAL EXAM:    GENERAL: Comfortable, no acute distress, on BiPAP  HEAD:  Normocephalic, atraumatic  EYES: EOMI, PERRLA  HEENT: Moist mucous membranes  NECK: Supple, No JVD  ERVOUS SYSTEM:  Alert & Oriented X3, Motor Strength 5/5 B/L upper and lower extremities  CHEST/LUNG: Bilateral crackles   HEART: Bradycardic, + systolic murmur   ABDOMEN: Soft, Nontender, Nondistended, Bowel sounds present  EXTREMITIES:   No clubbing, cyanosis, + 2+ LE edema  MUSCULOSKELETAL: No muscle tenderness, no joint tenderness  SKIN:  warm and dry, no rash    LABS:	 	                        10.9   11.1  )-----------( 415      ( 14 Mar 2018 05:14 )             33.4     Auto Eosinophil # 0.0   / Auto Eosinophil % 0.0   / Auto Neutrophil # 9.8   / Auto Neutrophil % 88.3  / BANDS % x                            10.7   13.2  )-----------( 355      ( 13 Mar 2018 23:50 )             33.9     Auto Eosinophil # 0.0   / Auto Eosinophil % 0.3   / Auto Neutrophil # 12.0  / Auto Neutrophil % 91.4  / BANDS % x        INR: 1.23 ratio (03-13 @ 23:50)    03-13    142  |  106  |  28<H>  ----------------------------<  194<H>  4.4   |  21<L>  |  1.48<H>    Ca    9.4      13 Mar 2018 23:50  Mg     2.0     03-13  Phos  4.7     03-13  TPro  8.0  /  Alb  3.8  /  TBili  0.5  /  DBili  x   /  AST  20  /  ALT  19  /  AlkPhos  157<H>  03-13    Lactate, Blood: 1.3 mmol/L (03-13 @ 23:50)      proBNP: Serum Pro-Brain Natriuretic Peptide: 8615 pg/mL (03-13 @ 23:50)    Lipid Profile: 03-14 Chol 94 LDL 42 HDL 39<L> Trig 63  HgA1c: 5.9 % (03-14 @ 01:57)    TSH: Thyroid Stimulating Hormone, Serum: 1.05 uIU/mL (03-14 @ 01:57)      CARDIAC MARKERS:   13 Mar 2018 23:50 Troponin 0.10 ng/mL / Creatine Kinase 38 U/L /  CKMB 3.1 ng/mL / CPK Mass Assay % x            TELEMETRY: 	    ECG:  atrial flutter with junctional escape rhythm, HR 50, nl axis, occasional PVCs  RADIOLOGY:  OTHER: 	    PREVIOUS DIAGNOSTIC TESTING:    [ ] Echocardiogram:  [ ]  Catheterization:  [ ] Stress Test:

## 2018-03-14 NOTE — CONSULT NOTE ADULT - ASSESSMENT
82 year male with history of HTN DM hyperlipidemia, PAF, h/o Carotid disease, possible PVD, presents with 1 day h/o acute shortness of breath, with complete heart block, sustained atrial flutter, and polymorphic VT. On lasix 40 IV BID after presented with severe acute pulmonary edema, appears improved.. Creatinine increased at 1.6

## 2018-03-15 LAB
ALBUMIN SERPL ELPH-MCNC: 3.3 G/DL — SIGNIFICANT CHANGE UP (ref 3.3–5)
ALBUMIN SERPL ELPH-MCNC: 3.4 G/DL — SIGNIFICANT CHANGE UP (ref 3.3–5)
ALP SERPL-CCNC: 127 U/L — HIGH (ref 40–120)
ALP SERPL-CCNC: 131 U/L — HIGH (ref 40–120)
ALT FLD-CCNC: 16 U/L RC — SIGNIFICANT CHANGE UP (ref 10–45)
ALT FLD-CCNC: 21 U/L RC — SIGNIFICANT CHANGE UP (ref 10–45)
ANION GAP SERPL CALC-SCNC: 14 MMOL/L — SIGNIFICANT CHANGE UP (ref 5–17)
ANION GAP SERPL CALC-SCNC: 18 MMOL/L — HIGH (ref 5–17)
APTT BLD: 28.9 SEC — SIGNIFICANT CHANGE UP (ref 27.5–37.4)
AST SERPL-CCNC: 18 U/L — SIGNIFICANT CHANGE UP (ref 10–40)
AST SERPL-CCNC: 26 U/L — SIGNIFICANT CHANGE UP (ref 10–40)
BILIRUB SERPL-MCNC: 0.4 MG/DL — SIGNIFICANT CHANGE UP (ref 0.2–1.2)
BILIRUB SERPL-MCNC: 0.4 MG/DL — SIGNIFICANT CHANGE UP (ref 0.2–1.2)
BLD GP AB SCN SERPL QL: POSITIVE — SIGNIFICANT CHANGE UP
BUN SERPL-MCNC: 45 MG/DL — HIGH (ref 7–23)
BUN SERPL-MCNC: 50 MG/DL — HIGH (ref 7–23)
CALCIUM SERPL-MCNC: 8.9 MG/DL — SIGNIFICANT CHANGE UP (ref 8.4–10.5)
CALCIUM SERPL-MCNC: 8.9 MG/DL — SIGNIFICANT CHANGE UP (ref 8.4–10.5)
CHLORIDE SERPL-SCNC: 105 MMOL/L — SIGNIFICANT CHANGE UP (ref 96–108)
CHLORIDE SERPL-SCNC: 107 MMOL/L — SIGNIFICANT CHANGE UP (ref 96–108)
CK MB CFR SERPL CALC: 2.5 NG/ML — SIGNIFICANT CHANGE UP (ref 0–6.7)
CK SERPL-CCNC: 44 U/L — SIGNIFICANT CHANGE UP (ref 30–200)
CO2 SERPL-SCNC: 21 MMOL/L — LOW (ref 22–31)
CO2 SERPL-SCNC: 22 MMOL/L — SIGNIFICANT CHANGE UP (ref 22–31)
CREAT SERPL-MCNC: 1.43 MG/DL — HIGH (ref 0.5–1.3)
CREAT SERPL-MCNC: 1.64 MG/DL — HIGH (ref 0.5–1.3)
GAS PNL BLDA: SIGNIFICANT CHANGE UP
GLUCOSE BLDC GLUCOMTR-MCNC: 125 MG/DL — HIGH (ref 70–99)
GLUCOSE BLDC GLUCOMTR-MCNC: 159 MG/DL — HIGH (ref 70–99)
GLUCOSE SERPL-MCNC: 157 MG/DL — HIGH (ref 70–99)
GLUCOSE SERPL-MCNC: 184 MG/DL — HIGH (ref 70–99)
HCT VFR BLD CALC: 32.5 % — LOW (ref 39–50)
HGB BLD-MCNC: 10.5 G/DL — LOW (ref 13–17)
INR BLD: 1.18 RATIO — HIGH (ref 0.88–1.16)
MAGNESIUM SERPL-MCNC: 2.2 MG/DL — SIGNIFICANT CHANGE UP (ref 1.6–2.6)
MAGNESIUM SERPL-MCNC: 2.4 MG/DL — SIGNIFICANT CHANGE UP (ref 1.6–2.6)
MCHC RBC-ENTMCNC: 27.5 PG — SIGNIFICANT CHANGE UP (ref 27–34)
MCHC RBC-ENTMCNC: 32.4 GM/DL — SIGNIFICANT CHANGE UP (ref 32–36)
MCV RBC AUTO: 84.6 FL — SIGNIFICANT CHANGE UP (ref 80–100)
PHOSPHATE SERPL-MCNC: 3.6 MG/DL — SIGNIFICANT CHANGE UP (ref 2.5–4.5)
PHOSPHATE SERPL-MCNC: 4.6 MG/DL — HIGH (ref 2.5–4.5)
PLATELET # BLD AUTO: 412 K/UL — HIGH (ref 150–400)
POTASSIUM SERPL-MCNC: 3.8 MMOL/L — SIGNIFICANT CHANGE UP (ref 3.5–5.3)
POTASSIUM SERPL-MCNC: 4.1 MMOL/L — SIGNIFICANT CHANGE UP (ref 3.5–5.3)
POTASSIUM SERPL-SCNC: 3.8 MMOL/L — SIGNIFICANT CHANGE UP (ref 3.5–5.3)
POTASSIUM SERPL-SCNC: 4.1 MMOL/L — SIGNIFICANT CHANGE UP (ref 3.5–5.3)
PROT SERPL-MCNC: 7 G/DL — SIGNIFICANT CHANGE UP (ref 6–8.3)
PROT SERPL-MCNC: 7.4 G/DL — SIGNIFICANT CHANGE UP (ref 6–8.3)
PROTHROM AB SERPL-ACNC: 12.9 SEC — HIGH (ref 9.8–12.7)
RBC # BLD: 3.84 M/UL — LOW (ref 4.2–5.8)
RBC # FLD: 16.2 % — HIGH (ref 10.3–14.5)
RH IG SCN BLD-IMP: POSITIVE — SIGNIFICANT CHANGE UP
SODIUM SERPL-SCNC: 141 MMOL/L — SIGNIFICANT CHANGE UP (ref 135–145)
SODIUM SERPL-SCNC: 146 MMOL/L — HIGH (ref 135–145)
TROPONIN T SERPL-MCNC: 0.09 NG/ML — HIGH (ref 0–0.06)
WBC # BLD: 18.5 K/UL — HIGH (ref 3.8–10.5)
WBC # FLD AUTO: 18.5 K/UL — HIGH (ref 3.8–10.5)

## 2018-03-15 PROCEDURE — 93458 L HRT ARTERY/VENTRICLE ANGIO: CPT | Mod: 26

## 2018-03-15 PROCEDURE — 86077 PHYS BLOOD BANK SERV XMATCH: CPT

## 2018-03-15 PROCEDURE — 93010 ELECTROCARDIOGRAM REPORT: CPT

## 2018-03-15 PROCEDURE — 71045 X-RAY EXAM CHEST 1 VIEW: CPT | Mod: 26

## 2018-03-15 PROCEDURE — 99291 CRITICAL CARE FIRST HOUR: CPT

## 2018-03-15 RX ORDER — SODIUM CHLORIDE 9 MG/ML
500 INJECTION INTRAMUSCULAR; INTRAVENOUS; SUBCUTANEOUS ONCE
Qty: 0 | Refills: 0 | Status: COMPLETED | OUTPATIENT
Start: 2018-03-15 | End: 2018-03-15

## 2018-03-15 RX ORDER — PROPOFOL 10 MG/ML
10 INJECTION, EMULSION INTRAVENOUS
Qty: 500 | Refills: 0 | Status: DISCONTINUED | OUTPATIENT
Start: 2018-03-15 | End: 2018-03-16

## 2018-03-15 RX ORDER — DORZOLAMIDE HYDROCHLORIDE TIMOLOL MALEATE 20; 5 MG/ML; MG/ML
1 SOLUTION/ DROPS OPHTHALMIC
Qty: 0 | Refills: 0 | Status: DISCONTINUED | OUTPATIENT
Start: 2018-03-15 | End: 2018-03-23

## 2018-03-15 RX ORDER — CEFAZOLIN SODIUM 1 G
1000 VIAL (EA) INJECTION ONCE
Qty: 0 | Refills: 0 | Status: DISCONTINUED | OUTPATIENT
Start: 2018-03-15 | End: 2018-03-16

## 2018-03-15 RX ORDER — SODIUM CHLORIDE 9 MG/ML
250 INJECTION INTRAMUSCULAR; INTRAVENOUS; SUBCUTANEOUS ONCE
Qty: 0 | Refills: 0 | Status: DISCONTINUED | OUTPATIENT
Start: 2018-03-15 | End: 2018-03-15

## 2018-03-15 RX ORDER — SODIUM CHLORIDE 9 MG/ML
1000 INJECTION INTRAMUSCULAR; INTRAVENOUS; SUBCUTANEOUS
Qty: 0 | Refills: 0 | Status: DISCONTINUED | OUTPATIENT
Start: 2018-03-15 | End: 2018-03-15

## 2018-03-15 RX ORDER — SODIUM CHLORIDE 9 MG/ML
50 INJECTION INTRAMUSCULAR; INTRAVENOUS; SUBCUTANEOUS ONCE
Qty: 0 | Refills: 0 | Status: COMPLETED | OUTPATIENT
Start: 2018-03-15 | End: 2018-03-15

## 2018-03-15 RX ORDER — FENTANYL CITRATE 50 UG/ML
1 INJECTION INTRAVENOUS
Qty: 5000 | Refills: 0 | Status: DISCONTINUED | OUTPATIENT
Start: 2018-03-15 | End: 2018-03-16

## 2018-03-15 RX ORDER — HYDRALAZINE HCL 50 MG
10 TABLET ORAL ONCE
Qty: 0 | Refills: 0 | Status: COMPLETED | OUTPATIENT
Start: 2018-03-15 | End: 2018-03-15

## 2018-03-15 RX ORDER — CHLORHEXIDINE GLUCONATE 213 G/1000ML
1 SOLUTION TOPICAL ONCE
Qty: 0 | Refills: 0 | Status: COMPLETED | OUTPATIENT
Start: 2018-03-15 | End: 2018-03-15

## 2018-03-15 RX ORDER — HYDRALAZINE HCL 50 MG
5 TABLET ORAL ONCE
Qty: 0 | Refills: 0 | Status: COMPLETED | OUTPATIENT
Start: 2018-03-15 | End: 2018-03-15

## 2018-03-15 RX ADMIN — FENTANYL CITRATE 3.83 MICROGRAM(S)/KG/HR: 50 INJECTION INTRAVENOUS at 22:00

## 2018-03-15 RX ADMIN — PROPOFOL 4.6 MICROGRAM(S)/KG/MIN: 10 INJECTION, EMULSION INTRAVENOUS at 22:00

## 2018-03-15 RX ADMIN — SODIUM CHLORIDE 5 MILLILITER(S): 9 INJECTION INTRAMUSCULAR; INTRAVENOUS; SUBCUTANEOUS at 16:00

## 2018-03-15 RX ADMIN — Medication 40 MILLIGRAM(S): at 22:40

## 2018-03-15 RX ADMIN — ATORVASTATIN CALCIUM 20 MILLIGRAM(S): 80 TABLET, FILM COATED ORAL at 23:18

## 2018-03-15 RX ADMIN — Medication 1 DROP(S): at 04:55

## 2018-03-15 RX ADMIN — Medication 5 MILLIGRAM(S): at 14:47

## 2018-03-15 RX ADMIN — Medication 1 DROP(S): at 16:42

## 2018-03-15 RX ADMIN — CHLORHEXIDINE GLUCONATE 1 APPLICATION(S): 213 SOLUTION TOPICAL at 04:54

## 2018-03-15 RX ADMIN — SODIUM CHLORIDE 75 MILLILITER(S): 9 INJECTION INTRAMUSCULAR; INTRAVENOUS; SUBCUTANEOUS at 17:29

## 2018-03-15 RX ADMIN — Medication 10 MILLIGRAM(S): at 16:16

## 2018-03-15 RX ADMIN — Medication 40 MILLIGRAM(S): at 04:54

## 2018-03-15 RX ADMIN — BRIMONIDINE TARTRATE 1 DROP(S): 2 SOLUTION/ DROPS OPHTHALMIC at 04:55

## 2018-03-15 RX ADMIN — TAMSULOSIN HYDROCHLORIDE 0.4 MILLIGRAM(S): 0.4 CAPSULE ORAL at 23:18

## 2018-03-15 RX ADMIN — SODIUM CHLORIDE 500 MILLILITER(S): 9 INJECTION INTRAMUSCULAR; INTRAVENOUS; SUBCUTANEOUS at 16:41

## 2018-03-15 NOTE — PROGRESS NOTE ADULT - SUBJECTIVE AND OBJECTIVE BOX
INTERVAL HPI/OVERNIGHT EVENTS: Events of day noted . Intermittent 2;1 and complete heart block, revrting back  to NSR. Frequent runs of nonsustained polymorphic VT. CAth with 100%RCA and 85% LCX,     confused after cath    MEDICATIONS  (STANDING):  artificial  tears Solution 1 Drop(s) Both EYES three times a day  atorvastatin 20 milliGRAM(s) Oral at bedtime  BACItracin   Ophthalmic Ointment 1 Application(s) Right EYE daily  brimonidine 0.2% Ophthalmic Solution 1 Drop(s) Right EYE two times a day  ceFAZolin   IVPB 1000 milliGRAM(s) IV Intermittent once  dextrose 5%. 1000 milliLiter(s) (50 mL/Hr) IV Continuous <Continuous>  dextrose 50% Injectable 12.5 Gram(s) IV Push once  dextrose 50% Injectable 25 Gram(s) IV Push once  dextrose 50% Injectable 25 Gram(s) IV Push once  dorzolamide 2% Ophthalmic Solution 1 Drop(s) Both EYES <User Schedule>  furosemide   Injectable 40 milliGRAM(s) IV Push two times a day  insulin lispro (HumaLOG) corrective regimen sliding scale   SubCutaneous at bedtime  insulin lispro (HumaLOG) corrective regimen sliding scale   SubCutaneous three times a day before meals  loteprednol 0.5% Ophthalmic Suspension 1 Drop(s) Both EYES two times a day  sodium chloride 0.9%. 1000 milliLiter(s) (75 mL/Hr) IV Continuous <Continuous>  tamsulosin 0.4 milliGRAM(s) Oral daily    MEDICATIONS  (PRN):  ALBUTerol/ipratropium for Nebulization 3 milliLiter(s) Nebulizer every 6 hours PRN Shortness of Breath and/or Wheezing  dextrose Gel 1 Dose(s) Oral once PRN Blood Glucose LESS THAN 70 milliGRAM(s)/deciliter  glucagon  Injectable 1 milliGRAM(s) IntraMuscular once PRN Glucose LESS THAN 70 milligrams/deciliter      Allergies    No Known Allergies    Intolerances      ROS:  General: Pt denies recent weight loss/fever/chills    Neurological: denies numbness or  sensation loss    Cardiovascular: denies chest pain/palpitations/leg edema    Respiratory and Thorax: denies SOB/cough/wheezing    Gastrointestinal: denies abdominal pain/diarrhea/constipation/bloody stool    Genitourinary: denies urinary frequency/urgency/ dysuria    Musculoskeletal: denies joint pain or swelling, denies restricted motion    Hematologic: denies abnormal bleeding  	    	  	    		        	    	            Vital Signs Last 24 Hrs  T(C): 37 (15 Mar 2018 16:00), Max: 37.1 (15 Mar 2018 05:00)  T(F): 98.6 (15 Mar 2018 16:00), Max: 98.7 (15 Mar 2018 05:00)  HR: 48 (15 Mar 2018 17:17) (38 - 80)  BP: 137/56 (15 Mar 2018 17:00) (137/56 - 201/84)  BP(mean): 96 (15 Mar 2018 17:00) (74 - 140)  RR: 26 (15 Mar 2018 17:10) (16 - 35)  SpO2: 94% (15 Mar 2018 17:17) (89% - 97%)  Daily     Daily Weight in k (15 Mar 2018 00:00)    14 @ 07:01  -  15 @ 07:00  --------------------------------------------------------  IN: 840 mL / OUT: 2280 mL / NET: -1440 mL    03-15 @ 07:01  -  03-15 @ 18:50  --------------------------------------------------------  IN: 100 mL / OUT: 1285 mL / NET: -1185 mL      Physical Exam:    WDWN male, axox2  jvd mildly elevated  cor rrr  lungclear  abdomen soft  ext no edema      LABS:                        10.5   18.5  )-----------( 412      ( 15 Mar 2018 05:05 )             32.5     03-15    141  |  105  |  50<H>  ----------------------------<  184<H>  4.1   |  22  |  1.64<H>    Ca    8.9      15 Mar 2018 05:05  Phos  3.6     03-15  Mg     2.4     -15    TPro  7.4  /  Alb  3.4  /  TBili  0.4  /  DBili  x   /  AST  18  /  ALT  16  /  AlkPhos  127<H>  03-15    PT/INR - ( 15 Mar 2018 05:05 )   PT: 12.9 sec;   INR: 1.18 ratio         PTT - ( 15 Mar 2018 05:05 )  PTT:28.9 sec      RADIOLOGY & ADDITIONAL TESTS:    TELE:    EKG:

## 2018-03-15 NOTE — PROGRESS NOTE ADULT - ASSESSMENT
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     #Neuro- A&Ox3, Romanian speaking only     #CVS  - remains hypertensive in the 170s-180s with bradycardia, asymptomatic   - Initial Jose Manuel elevated likely in the setting of demand ischemia, ECHO yesterday showing grossly preserved LV function  - plan for PPM today with EP   - continues to have intermittent runs of polymorphic VT -- electrolytes wnl  - continue to diurese with lasix 40 IV BID with noted mildly elevated pulmonary pressures on ECHO -- net (-) 1.4 L in past 24 hrs      #Pulm- Hypoxic respiratory failure in setting of pulmonary edema 2/2 ADHF  - on 4L NC   - still volume overloaded with crackles noted on exam, net (-) 1.4 L   - c/w 40 IV lasix BID     #GI  - tolerated diet well, NPO for PPM procedure at some point today      #Renal  - creatinine continues to rise 1.64 from 1.58  - BUN:Cr>20, suggesting poor forward flow in the setting likely in the setting of elevated blood pressures, with back up of fluid      #ID  = white count elevated to 18 from 11 yesterday - - likely in the setting of receiving solumedrol 125 in the ED  - hemodynamically stable with no infectious symptoms, will continue to monitor off of antibiotics      #Endocrine  - LSSI started, A1c of 5.9 not on any medications at home   - hx of hypoglycemic episodes     #Glaucoma  - as per o/p optholmalogist, okay to continue trusopt without timolol eye drops in addition to all of his other eye drops for his glaucoma     Nabil Self  PGY1  [phone] 81 y/o M w/ PMHx of DM2, HTN, R sided endarterectomy (7/2017) c/p bleed, 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     #Neuro- A&Ox3, Bahamian speaking only     #CVS  - remains hypertensive in the 170s-180s with bradycardia, asymptomatic   - Initial Jose Manuel elevated likely in the setting of demand ischemia, ECHO yesterday showing grossly preserved LV function  - plan for PPM today with EP   - continues to have intermittent runs of polymorphic VT -- electrolytes wnl  - continue to diurese with lasix 40 IV BID with noted mildly elevated pulmonary pressures on ECHO -- net (-) 1.4 L in past 24 hrs      #Pulm- Hypoxic respiratory failure in setting of pulmonary edema 2/2 ADHF  - on 4L NC   - still volume overloaded with crackles noted on exam, net (-) 1.4 L   - c/w 40 IV lasix BID     #GI  - tolerated diet well, NPO for PPM procedure at some point today      #Renal  - creatinine continues to rise 1.64 from 1.58  - BUN:Cr>20, suggesting poor forward flow in the setting likely in the setting of elevated blood pressures, with back up of fluid      #ID  = white count elevated to 18 from 11 yesterday - - likely in the setting of receiving solumedrol 125 in the ED  - hemodynamically stable with no infectious symptoms, will continue to monitor   - ancef ordered for procedure     #Endocrine  - LSSI started, A1c of 5.9 not on any medications at home   - hx of hypoglycemic episodes     #Glaucoma  - as per o/p optholmalogist, okay to continue trusopt without timolol eye drops in addition to all of his other eye drops for his glaucoma     Nabil Self  PGY1  [phone]

## 2018-03-15 NOTE — PROGRESS NOTE ADULT - SUBJECTIVE AND OBJECTIVE BOX
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   Wife at bedside provided the history.   Patient was very independent and in a good state of health until he had the carotid endarterectomy in July last year which was complicated by some type of bleeding resulting in a month long hospitalization at [facility]. Since the event he has had about 2-3 hospitalizations from other complications followed by rehab stays.  More recently, the patient has been doing better and living at home with his wife. Wife reports he is able to walk around the house without difficulty and mainly only needs help changing.   This morning, he woke up feeling very short of breath so they called EMS and he was brought to [facility]  Patient was placed on BiPAP at OSH w/ ABG showing PaO2 80 on CPAP 100%.   Given Duoneb, lasix 60mg IV, solumedrol 125mg     Wife reports he denied any F/C, CP, N/V, abd pain, cough, headache or dizziness.   Patient has chronic LE edema since his prior hospitalizations.   Wife also reports a large amount of urine come out when they place a fernández in the ED.     Patient had persistent bradycardia and was transferred to [facility] for possible PPM.     4/2017- stress test neg for ischemia, at that time in NSR w/ RBBB  3/2017- echo showed normal LV function w/ moderate concentric hypertrophy (13 Mar 2018 23:00)    Overnight:  Had another episode of Polymorphic VT 11 beats and was asymptomatic. No acute issues or concerns - - no chest pain, shortness of breath or palpitations     MEDICATIONS  (STANDING):  artificial  tears Solution 1 Drop(s) Both EYES three times a day  atorvastatin 20 milliGRAM(s) Oral at bedtime  BACItracin   Ophthalmic Ointment 1 Application(s) Right EYE daily  brimonidine 0.2% Ophthalmic Solution 1 Drop(s) Right EYE two times a day  ceFAZolin   IVPB 1000 milliGRAM(s) IV Intermittent once  dextrose 5%. 1000 milliLiter(s) (50 mL/Hr) IV Continuous <Continuous>  dextrose 50% Injectable 12.5 Gram(s) IV Push once  dextrose 50% Injectable 25 Gram(s) IV Push once  dextrose 50% Injectable 25 Gram(s) IV Push once  dorzolamide 2% Ophthalmic Solution 1 Drop(s) Both EYES <User Schedule>  furosemide   Injectable 40 milliGRAM(s) IV Push two times a day  insulin lispro (HumaLOG) corrective regimen sliding scale   SubCutaneous at bedtime  insulin lispro (HumaLOG) corrective regimen sliding scale   SubCutaneous three times a day before meals  loteprednol 0.5% Ophthalmic Suspension 1 Drop(s) Both EYES two times a day  tamsulosin 0.4 milliGRAM(s) Oral daily    MEDICATIONS  (PRN):  ALBUTerol/ipratropium for Nebulization 3 milliLiter(s) Nebulizer every 6 hours PRN Shortness of Breath and/or Wheezing  dextrose Gel 1 Dose(s) Oral once PRN Blood Glucose LESS THAN 70 milliGRAM(s)/deciliter  glucagon  Injectable 1 milliGRAM(s) IntraMuscular once PRN Glucose LESS THAN 70 milligrams/deciliter      REVIEW OF SYSTEMS:  Otherwise, 10 point ROS done and otherwise negative.    PHYSICAL EXAM:  Vital Signs Last 24 Hrs  T(C): 37.1 (15 Mar 2018 05:00), Max: 37.1 (14 Mar 2018 13:30)  T(F): 98.7 (15 Mar 2018 05:00), Max: 98.7 (14 Mar 2018 13:30)  HR: 44 (15 Mar 2018 08:14) (38 - 68)  BP: 174/86 (15 Mar 2018 06:00) (146/57 - 205/55)  BP(mean): 140 (15 Mar 2018 06:00) (71 - 140)  RR: 22 (15 Mar 2018 06:00) (18 - 30)  SpO2: 92% (15 Mar 2018 08:14) (91% - 96%)  I&O's Summary    14 Mar 2018 07:01  -  15 Mar 2018 07:00  --------------------------------------------------------  IN: 840 mL / OUT: 2280 mL / NET: -1440 mL    Appearance: Normal	  HEENT:   Normal oral mucosa, PERRL, EOMI	  Lymphatic: No lymphadenopathy  Cardiovascular: Normal S1 S2, No JVD, No murmurs, rubs or gallops  Respiratory: Lungs clear to auscultation, crackles appreciated in lower lungs, no wheezes or rhonchi 	  Psychiatry: A & O x 3, Mood & affect appropriate  Gastrointestinal:  Soft, Non-tender, + BS	  Skin: No rashes, No ecchymoses, No cyanosis	  Neurologic: Non-focal  Extremities: Normal range of motion, No clubbing, cyanosis or edema  Vascular: Peripheral pulses palpable 2+ bilaterally    LABS:	 	                        10.5   18.5  )-----------( 412      ( 15 Mar 2018 05:05 )             32.5     Auto Eosinophil # x     / Auto Eosinophil % x     / Auto Neutrophil # x     / Auto Neutrophil % x     / BANDS % x                            10.9   11.1  )-----------( 415      ( 14 Mar 2018 05:14 )             33.4     Auto Eosinophil # 0.0   / Auto Eosinophil % 0.0   / Auto Neutrophil # 9.8   / Auto Neutrophil % 88.3  / BANDS % x                            10.7   13.2  )-----------( 355      ( 13 Mar 2018 23:50 )             33.9     Auto Eosinophil # 0.0   / Auto Eosinophil % 0.3   / Auto Neutrophil # 12.0  / Auto Neutrophil % 91.4  / BANDS % x        INR: 1.18 ratio (03-15 @ 05:05)  INR: 1.23 ratio (03-13 @ 23:50)    03-15    141  |  105  |  50<H>  ----------------------------<  184<H>  4.1   |  22  |  1.64<H>  03-14    141  |  104  |  39<H>  ----------------------------<  236<H>  4.2   |  18<L>  |  1.58<H>  03-14    143  |  107  |  32<H>  ----------------------------<  212<H>  4.7   |  18<L>  |  1.61<H>    Ca    8.9      15 Mar 2018 05:05  Mg     2.4     03-15  Phos  3.6     03-15  TPro  7.4  /  Alb  3.4  /  TBili  0.4  /  DBili  x   /  AST  18  /  ALT  16  /  AlkPhos  127<H>  03-15  TPro  7.2  /  Alb  3.2<L>  /  TBili  0.4  /  DBili  x   /  AST  15  /  ALT  15  /  AlkPhos  140<H>  03-14  TPro  8.0  /  Alb  3.8  /  TBili  0.5  /  DBili  x   /  AST  20  /  ALT  19  /  AlkPhos  157<H>  03-13    Lactate, Blood: 1.3 mmol/L (03-13 @ 23:50)      proBNP: Serum Pro-Brain Natriuretic Peptide: 8615 pg/mL (03-13 @ 23:50)    Lipid Profile: 03-14 Chol 94 LDL 42 HDL 39<L> Trig 63  HgA1c: 5.9 % (03-14 @ 01:57)    TSH: Thyroid Stimulating Hormone, Serum: 1.05 uIU/mL (03-14 @ 01:57)      CARDIAC MARKERS:   13 Mar 2018 23:50 Troponin 0.10 ng/mL / Creatine Kinase 38 U/L /  CKMB 3.1 ng/mL / CPK Mass Assay % x        TELEMETRY: 11 beats of Polymorphic VT at 5:45  AM 	    ECG:  	  RADIOLOGY:  OTHER: 	    PREVIOUS DIAGNOSTIC TESTING:    [ ] Echocardiogram:  < from: TTE with Doppler (w/Cont) (03.14.18 @ 06:29) >  Conclusions:  Suboptimal image quality; limited study.  1. Mitral annular calcification.  Thickened mitral valve  leaflets. Mild mitral regurgitation.  2. Aortic valve not well visualized; appears calcified with  normal opening. Peak transaortic valve gradient equals 10  mm Hg. Minimal aortic regurgitation.  3. Left atrium not well visualized; appears dilated.  4. Normal left ventricular internal dimensions.  5. Endocardial visualization enhanced with intravenous  injection of echo contrast (Definity).  Endocardium not  well visualized despite the use of echo contrast; overall  left ventricular systolic function appears preserved.  Paradoxical septal motion.  6. The right ventricle is not well visualized.  7. Estimated right ventricular systolic pressure equals 48  mm Hg, assuming right atrial pressure equals 8 mm Hg,  consistent with mild pulmonary hypertension.  *** No previous Echo exam.    < end of copied text >    [ ]  Catheterization:  [ ] Stress Test:

## 2018-03-15 NOTE — PROGRESS NOTE ADULT - ASSESSMENT
82 year male with history of HTN DM hyperlipidemia, PAF, h/o Carotid disease, possible PVD, presents with 1 day h/o acute shortness of breath, with complete heart block, sustained atrial flutter, and polymorphic VT. On lasix 40 IV BID after presented with severe acute pulmonary edema, appears improved.. Creatinine increased at 1.6. Palpymorphic VT  can be due to prolongation of QT in setting of bradycardia, or myocardial ischemia in setting of 85% RCA lesion , or prior infact in setting of 100% RCA occlusion. plan for temp pacer, then ICD wti hdual cahmber pacing, then revascularization of LCX

## 2018-03-15 NOTE — CHART NOTE - NSCHARTNOTEFT_GEN_A_CORE
PROCEDURE NOTE    TEMPORARY PACEMAKER PLACEMENT AND ICD IMPLANT    This patient underwent an implant of a Medtronic dual chamber ICD . Due to the fact that the patient had complete heart block and polymorphic VT/VF , a temporary pacemaker lead was placed into the right femoral vein with conscious sedation prior to ICD implant, and a temporary pacing wire was used to pace the patient VVI @ 80BPM. The patient was confused and agitated and anesthesia sedated with conscious sedation. After the patient was draped and prepped in the usual sterile manner, and a venogram was performed,  the patient's skin was cut with a 2 inch incision over the deltopectoral groove. The patient was agitated and unable to be adequately sedated to complete the procedure, and appeared in moderate respiratory distress, and the patient was electively intubated. The left subclavian vein was then accessed via standard Seldinger technique, and using an over the wire technique, a dual chamber ICD was placed with the leads in adequate position.  The temporary wire was then removed from the right femoral vein and the patient was returned to the CCU in stable condition, but was still confused and intubated. The patient was in NSR with a sensing V pacing at 90 BPM, /80 mmHg.

## 2018-03-15 NOTE — PROGRESS NOTE ADULT - PROBLEM SELECTOR PLAN 3
eitiology unclear. could be due to bradycardia or ischemia/ small infarct.   With current state of agitation, will implant permanent device . will place ICD for VT/VF protection. Discussed with wife in detail. patient is usually alert and oriented x3 with fully functional lifestyle, and she wishes to have him resusitated in case of cardiac arrest

## 2018-03-15 NOTE — PROGRESS NOTE ADULT - PROBLEM SELECTOR PLAN 2
will initiate beta blocker and possibly low dose amiodarone post implant. patient has been on plavix with known cerebrovascular disease, but his primary cardiologist, Dr weaver has been hesitant to start full dose anticoagulation with GI bleed in 12/2017

## 2018-03-16 DIAGNOSIS — I25.10 ATHEROSCLEROTIC HEART DISEASE OF NATIVE CORONARY ARTERY WITHOUT ANGINA PECTORIS: ICD-10-CM

## 2018-03-16 LAB
ALBUMIN SERPL ELPH-MCNC: 3.2 G/DL — LOW (ref 3.3–5)
ALP SERPL-CCNC: 135 U/L — HIGH (ref 40–120)
ALT FLD-CCNC: 18 U/L RC — SIGNIFICANT CHANGE UP (ref 10–45)
ANION GAP SERPL CALC-SCNC: 15 MMOL/L — SIGNIFICANT CHANGE UP (ref 5–17)
AST SERPL-CCNC: 29 U/L — SIGNIFICANT CHANGE UP (ref 10–40)
BASOPHILS # BLD AUTO: 0 K/UL — SIGNIFICANT CHANGE UP (ref 0–0.2)
BASOPHILS NFR BLD AUTO: 0.1 % — SIGNIFICANT CHANGE UP (ref 0–2)
BILIRUB SERPL-MCNC: 0.4 MG/DL — SIGNIFICANT CHANGE UP (ref 0.2–1.2)
BUN SERPL-MCNC: 45 MG/DL — HIGH (ref 7–23)
CALCIUM SERPL-MCNC: 8.8 MG/DL — SIGNIFICANT CHANGE UP (ref 8.4–10.5)
CHLORIDE SERPL-SCNC: 106 MMOL/L — SIGNIFICANT CHANGE UP (ref 96–108)
CK MB CFR SERPL CALC: 2.6 NG/ML — SIGNIFICANT CHANGE UP (ref 0–6.7)
CK SERPL-CCNC: 63 U/L — SIGNIFICANT CHANGE UP (ref 30–200)
CO2 SERPL-SCNC: 23 MMOL/L — SIGNIFICANT CHANGE UP (ref 22–31)
CREAT SERPL-MCNC: 1.36 MG/DL — HIGH (ref 0.5–1.3)
EOSINOPHIL # BLD AUTO: 0.1 K/UL — SIGNIFICANT CHANGE UP (ref 0–0.5)
EOSINOPHIL NFR BLD AUTO: 0.9 % — SIGNIFICANT CHANGE UP (ref 0–6)
GAS PNL BLDA: SIGNIFICANT CHANGE UP
GLUCOSE BLDC GLUCOMTR-MCNC: 115 MG/DL — HIGH (ref 70–99)
GLUCOSE BLDC GLUCOMTR-MCNC: 127 MG/DL — HIGH (ref 70–99)
GLUCOSE BLDC GLUCOMTR-MCNC: 132 MG/DL — HIGH (ref 70–99)
GLUCOSE BLDC GLUCOMTR-MCNC: 200 MG/DL — HIGH (ref 70–99)
GLUCOSE SERPL-MCNC: 136 MG/DL — HIGH (ref 70–99)
HCT VFR BLD CALC: 33.5 % — LOW (ref 39–50)
HGB BLD-MCNC: 10.7 G/DL — LOW (ref 13–17)
LYMPHOCYTES # BLD AUTO: 1.4 K/UL — SIGNIFICANT CHANGE UP (ref 1–3.3)
LYMPHOCYTES # BLD AUTO: 12.1 % — LOW (ref 13–44)
MAGNESIUM SERPL-MCNC: 2.3 MG/DL — SIGNIFICANT CHANGE UP (ref 1.6–2.6)
MCHC RBC-ENTMCNC: 27.5 PG — SIGNIFICANT CHANGE UP (ref 27–34)
MCHC RBC-ENTMCNC: 32 GM/DL — SIGNIFICANT CHANGE UP (ref 32–36)
MCV RBC AUTO: 85.8 FL — SIGNIFICANT CHANGE UP (ref 80–100)
MONOCYTES # BLD AUTO: 1.6 K/UL — HIGH (ref 0–0.9)
MONOCYTES NFR BLD AUTO: 14.3 % — HIGH (ref 2–14)
NEUTROPHILS # BLD AUTO: 8.2 K/UL — HIGH (ref 1.8–7.4)
NEUTROPHILS NFR BLD AUTO: 72.6 % — SIGNIFICANT CHANGE UP (ref 43–77)
PHOSPHATE SERPL-MCNC: 4.7 MG/DL — HIGH (ref 2.5–4.5)
PLATELET # BLD AUTO: 371 K/UL — SIGNIFICANT CHANGE UP (ref 150–400)
POTASSIUM SERPL-MCNC: 4.4 MMOL/L — SIGNIFICANT CHANGE UP (ref 3.5–5.3)
POTASSIUM SERPL-SCNC: 4.4 MMOL/L — SIGNIFICANT CHANGE UP (ref 3.5–5.3)
PROCALCITONIN SERPL-MCNC: 0.14 NG/ML — HIGH (ref 0–0.04)
PROT SERPL-MCNC: 7.1 G/DL — SIGNIFICANT CHANGE UP (ref 6–8.3)
RBC # BLD: 3.9 M/UL — LOW (ref 4.2–5.8)
RBC # FLD: 16.3 % — HIGH (ref 10.3–14.5)
SODIUM SERPL-SCNC: 144 MMOL/L — SIGNIFICANT CHANGE UP (ref 135–145)
TROPONIN T SERPL-MCNC: 0.08 NG/ML — HIGH (ref 0–0.06)
WBC # BLD: 11.4 K/UL — HIGH (ref 3.8–10.5)
WBC # FLD AUTO: 11.4 K/UL — HIGH (ref 3.8–10.5)

## 2018-03-16 PROCEDURE — 99222 1ST HOSP IP/OBS MODERATE 55: CPT | Mod: GC

## 2018-03-16 PROCEDURE — 99291 CRITICAL CARE FIRST HOUR: CPT

## 2018-03-16 PROCEDURE — 71045 X-RAY EXAM CHEST 1 VIEW: CPT | Mod: 26

## 2018-03-16 PROCEDURE — 93010 ELECTROCARDIOGRAM REPORT: CPT

## 2018-03-16 RX ORDER — CLOPIDOGREL BISULFATE 75 MG/1
75 TABLET, FILM COATED ORAL DAILY
Qty: 0 | Refills: 0 | Status: DISCONTINUED | OUTPATIENT
Start: 2018-03-16 | End: 2018-03-17

## 2018-03-16 RX ORDER — ATORVASTATIN CALCIUM 80 MG/1
80 TABLET, FILM COATED ORAL AT BEDTIME
Qty: 0 | Refills: 0 | Status: DISCONTINUED | OUTPATIENT
Start: 2018-03-16 | End: 2018-03-16

## 2018-03-16 RX ORDER — ATORVASTATIN CALCIUM 80 MG/1
40 TABLET, FILM COATED ORAL AT BEDTIME
Qty: 0 | Refills: 0 | Status: DISCONTINUED | OUTPATIENT
Start: 2018-03-16 | End: 2018-03-23

## 2018-03-16 RX ORDER — CARVEDILOL PHOSPHATE 80 MG/1
12.5 CAPSULE, EXTENDED RELEASE ORAL EVERY 12 HOURS
Qty: 0 | Refills: 0 | Status: DISCONTINUED | OUTPATIENT
Start: 2018-03-16 | End: 2018-03-18

## 2018-03-16 RX ORDER — CARVEDILOL PHOSPHATE 80 MG/1
6.25 CAPSULE, EXTENDED RELEASE ORAL EVERY 12 HOURS
Qty: 0 | Refills: 0 | Status: DISCONTINUED | OUTPATIENT
Start: 2018-03-16 | End: 2018-03-16

## 2018-03-16 RX ADMIN — LOTEPREDNOL ETABONATE 1 DROP(S): 2 SUSPENSION/ DROPS OPHTHALMIC at 05:05

## 2018-03-16 RX ADMIN — CARVEDILOL PHOSPHATE 6.25 MILLIGRAM(S): 80 CAPSULE, EXTENDED RELEASE ORAL at 10:03

## 2018-03-16 RX ADMIN — LOTEPREDNOL ETABONATE 1 DROP(S): 2 SUSPENSION/ DROPS OPHTHALMIC at 17:02

## 2018-03-16 RX ADMIN — DORZOLAMIDE HYDROCHLORIDE TIMOLOL MALEATE 1 DROP(S): 20; 5 SOLUTION/ DROPS OPHTHALMIC at 05:04

## 2018-03-16 RX ADMIN — BACITRACIN 1 APPLICATION(S): 500 OINTMENT OPHTHALMIC at 11:42

## 2018-03-16 RX ADMIN — Medication 1 DROP(S): at 14:46

## 2018-03-16 RX ADMIN — Medication 1 DROP(S): at 23:17

## 2018-03-16 RX ADMIN — BRIMONIDINE TARTRATE 1 DROP(S): 2 SOLUTION/ DROPS OPHTHALMIC at 17:01

## 2018-03-16 RX ADMIN — DORZOLAMIDE HYDROCHLORIDE TIMOLOL MALEATE 1 DROP(S): 20; 5 SOLUTION/ DROPS OPHTHALMIC at 17:01

## 2018-03-16 RX ADMIN — CARVEDILOL PHOSPHATE 12.5 MILLIGRAM(S): 80 CAPSULE, EXTENDED RELEASE ORAL at 21:39

## 2018-03-16 RX ADMIN — CLOPIDOGREL BISULFATE 75 MILLIGRAM(S): 75 TABLET, FILM COATED ORAL at 11:29

## 2018-03-16 RX ADMIN — Medication 1 DROP(S): at 05:04

## 2018-03-16 RX ADMIN — BRIMONIDINE TARTRATE 1 DROP(S): 2 SOLUTION/ DROPS OPHTHALMIC at 05:04

## 2018-03-16 RX ADMIN — Medication 1: at 17:43

## 2018-03-16 RX ADMIN — TAMSULOSIN HYDROCHLORIDE 0.4 MILLIGRAM(S): 0.4 CAPSULE ORAL at 21:39

## 2018-03-16 RX ADMIN — Medication 40 MILLIGRAM(S): at 10:15

## 2018-03-16 RX ADMIN — Medication 40 MILLIGRAM(S): at 21:39

## 2018-03-16 RX ADMIN — ATORVASTATIN CALCIUM 40 MILLIGRAM(S): 80 TABLET, FILM COATED ORAL at 21:39

## 2018-03-16 NOTE — CONSULT NOTE ADULT - SUBJECTIVE AND OBJECTIVE BOX
Chief Complaint:  Patient is a 82y old  Male who presents with a chief complaint of Shortness of breath (13 Mar 2018 23:00)      HPI:  This is an 82 year old man with a history of CAD, afib, carotid artery stenosis on plavix who presented to an OSH with SOB. Patient was found to be in aflutter and CHF and subsequently developed alternating complete heart block and Vtach for which he was transferred to [facility]. The patient underwent implantation of an AICD.   He has a history of 2 GI bleeds requiring hospitalization in July and December of last year. One episode resulted in aspiration PNA and Kidney failure and required a prolonged hospitalization at [facility]. The patient has since been on plavix only. Per the patient's wife an EGD at that time may have shown an ulcer. His second episode in December resulted in a colonoscopy with possible polypectomy. Sincer that time there has been no melena, hematochezia, abdominal pain dysphagia or unintentinoal weight loss. The Gi team is requested to comment on the patient's anticoagulation risk prior to initiation of a/c for afib.  Pt was found to have CAD on cath this admission.      Allergies:  No Known Allergies      Home Medications:    Hospital Medications:  ALBUTerol/ipratropium for Nebulization 3 milliLiter(s) Nebulizer every 6 hours PRN  artificial  tears Solution 1 Drop(s) Both EYES three times a day  atorvastatin 40 milliGRAM(s) Oral at bedtime  BACItracin   Ophthalmic Ointment 1 Application(s) Right EYE daily  brimonidine 0.2% Ophthalmic Solution 1 Drop(s) Right EYE two times a day  carvedilol 6.25 milliGRAM(s) Oral every 12 hours  clopidogrel Tablet 75 milliGRAM(s) Oral daily  dextrose 5%. 1000 milliLiter(s) IV Continuous <Continuous>  dextrose 50% Injectable 12.5 Gram(s) IV Push once  dextrose 50% Injectable 25 Gram(s) IV Push once  dextrose 50% Injectable 25 Gram(s) IV Push once  dextrose Gel 1 Dose(s) Oral once PRN  dorzolamide 2%/timolol 0.5% Ophthalmic Solution 1 Drop(s) Both EYES two times a day  furosemide   Injectable 40 milliGRAM(s) IV Push two times a day  glucagon  Injectable 1 milliGRAM(s) IntraMuscular once PRN  insulin lispro (HumaLOG) corrective regimen sliding scale   SubCutaneous at bedtime  insulin lispro (HumaLOG) corrective regimen sliding scale   SubCutaneous three times a day before meals  loteprednol 0.5% Ophthalmic Suspension 1 Drop(s) Both EYES two times a day  tamsulosin 0.4 milliGRAM(s) Oral daily      PMHX/PSHX:  Atrial flutter, unspecified type  Type 2 diabetes mellitus without complication, without long-term current use of insulin  Essential hypertension  Polyp of colon, unspecified part of colon, unspecified type  History of carotid endarterectomy      Family history:  No pertinent family history in first degree relatives      Social History:   former smoker nondrinker  ROS:     General:  No wt loss, fevers, chills, night sweats, fatigue,   Eyes:  Good vision, no reported pain  ENT:  No sore throat, pain, runny nose, dysphagia  CV:  No pain, palpitations, hypo/hypertension  Resp:  No dyspnea, cough, tachypnea, wheezing  GI:  See HPI  :  No pain, bleeding, incontinence, nocturia  Muscle:  No pain, weakness  Neuro:  No weakness, tingling, memory problems  Psych:  No fatigue, insomnia, mood problems, depression  Endocrine:  No polyuria, polydipsia, cold/heat intolerance  Heme:  No petechiae, ecchymosis, easy bruisability  Skin:  No rash, edema      PHYSICAL EXAM:     GENERAL:  Appears stated age, well-groomed, well-nourished, no distress  HEENT:  NC/AT,  conjunctivae clear and pink,  no JVD  CHEST:  Full & symmetric excursion, no increased effort, breath sounds clear  HEART:  Regular rhythm, S1, S2, no murmur/rub/S3/S4, no abdominal bruit, no edema  ABDOMEN:  Soft, non-tender, non-distended, normoactive bowel sounds,  no masses , no hepatosplenomegaly  EXTREMITIES:  no cyanosis,clubbing or edema  SKIN:  No rash/erythema/ecchymoses/petechiae/wounds/abscess/warm/dry  NEURO:  Alert, oriented    Vital Signs:  Vital Signs Last 24 Hrs  T(C): 36.9 (16 Mar 2018 09:00), Max: 37.7 (15 Mar 2018 21:45)  T(F): 98.5 (16 Mar 2018 09:00), Max: 99.8 (15 Mar 2018 21:45)  HR: 102 (16 Mar 2018 15:00) (48 - 102)  BP: 169/74 (16 Mar 2018 15:00) (98/51 - 193/81)  BP(mean): 113 (16 Mar 2018 15:00) (66 - 128)  RR: 28 (16 Mar 2018 15:00) (11 - 28)  SpO2: 95% (16 Mar 2018 16:09) (93% - 100%)  Daily     Daily Weight in k.9 (16 Mar 2018 04:15)    LABS:                        10.7   11.4  )-----------( 371      ( 16 Mar 2018 06:41 )             33.5     03-16    144  |  106  |  45<H>  ----------------------------<  136<H>  4.4   |  23  |  1.36<H>    Ca    8.8      16 Mar 2018 04:51  Phos  4.7     03-16  Mg     2.3     03-16    TPro  7.1  /  Alb  3.2<L>  /  TBili  0.4  /  DBili  x   /  AST  29  /  ALT  18  /  AlkPhos  135<H>  03-16    LIVER FUNCTIONS - ( 16 Mar 2018 04:51 )  Alb: 3.2 g/dL / Pro: 7.1 g/dL / ALK PHOS: 135 U/L / ALT: 18 U/L RC / AST: 29 U/L / GGT: x           PT/INR - ( 15 Mar 2018 05:05 )   PT: 12.9 sec;   INR: 1.18 ratio         PTT - ( 15 Mar 2018 05:05 )  PTT:28.9 sec        Imaging:

## 2018-03-16 NOTE — PROGRESS NOTE ADULT - PROBLEM SELECTOR PLAN 4
GI evaluation in process regarding DAPT ? upper and lower endoscopy? then decide on possible angioplasty of LCX

## 2018-03-16 NOTE — PROGRESS NOTE ADULT - SUBJECTIVE AND OBJECTIVE BOX
INTERVAL HPI/OVERNIGHT EVENTS: patient notably extubated, ICD interogated R wave 1.0 but only rare VPB noted. sensing changed to tip to coil with R wave noted with this vector at 12. thresshold stable at 0.5 @ 0.5 ms and lead impedance stable. CXR showing CHF with right effusion and lead appears in grossly same position.    MEDICATIONS  (STANDING):  artificial  tears Solution 1 Drop(s) Both EYES three times a day  atorvastatin 40 milliGRAM(s) Oral at bedtime  BACItracin   Ophthalmic Ointment 1 Application(s) Right EYE daily  brimonidine 0.2% Ophthalmic Solution 1 Drop(s) Right EYE two times a day  carvedilol 12.5 milliGRAM(s) Oral every 12 hours  clopidogrel Tablet 75 milliGRAM(s) Oral daily  dextrose 5%. 1000 milliLiter(s) (50 mL/Hr) IV Continuous <Continuous>  dextrose 50% Injectable 12.5 Gram(s) IV Push once  dextrose 50% Injectable 25 Gram(s) IV Push once  dextrose 50% Injectable 25 Gram(s) IV Push once  dorzolamide 2%/timolol 0.5% Ophthalmic Solution 1 Drop(s) Both EYES two times a day  furosemide   Injectable 40 milliGRAM(s) IV Push two times a day  insulin lispro (HumaLOG) corrective regimen sliding scale   SubCutaneous at bedtime  insulin lispro (HumaLOG) corrective regimen sliding scale   SubCutaneous three times a day before meals  loteprednol 0.5% Ophthalmic Suspension 1 Drop(s) Both EYES two times a day  tamsulosin 0.4 milliGRAM(s) Oral daily    MEDICATIONS  (PRN):  ALBUTerol/ipratropium for Nebulization 3 milliLiter(s) Nebulizer every 6 hours PRN Shortness of Breath and/or Wheezing  dextrose Gel 1 Dose(s) Oral once PRN Blood Glucose LESS THAN 70 milliGRAM(s)/deciliter  glucagon  Injectable 1 milliGRAM(s) IntraMuscular once PRN Glucose LESS THAN 70 milligrams/deciliter      Allergies    No Known Allergies    Intolerances      ROS:  General: Pt denies recent weight loss/fever/chills    Neurological: denies numbness or  sensation loss    Cardiovascular: denies chest pain/palpitations/leg edema    Respiratory and Thorax: denies SOB/cough/wheezing    Gastrointestinal: denies abdominal pain/diarrhea/constipation/bloody stool    Genitourinary: denies urinary frequency/urgency/ dysuria    Musculoskeletal: denies joint pain or swelling, denies restricted motion    Hematologic: denies abnormal bleeding  	    	  	    		        	    	            Vital Signs Last 24 Hrs  T(C): 36.9 (16 Mar 2018 18:00), Max: 37.7 (15 Mar 2018 21:45)  T(F): 98.4 (16 Mar 2018 18:00), Max: 99.8 (15 Mar 2018 21:45)  HR: 96 (16 Mar 2018 18:00) (78 - 102)  BP: 178/75 (16 Mar 2018 18:00) (98/51 - 193/81)  BP(mean): 120 (16 Mar 2018 18:00) (66 - 128)  RR: 22 (16 Mar 2018 18:00) (11 - 28)  SpO2: 94% (16 Mar 2018 18:00) (93% - 100%)  Daily     Daily Weight in k.9 (16 Mar 2018 04:15)    15 @ 07:01  -   @ 07:00  --------------------------------------------------------  IN: 338.5 mL / OUT: 2735 mL / NET: -2396.5 mL     @ 07:01  -   @ 19:18  --------------------------------------------------------  IN: 720 mL / OUT: 1825 mL / NET: -1105 mL      Physical Exam:      LABS:                        10.7   11.4  )-----------( 371      ( 16 Mar 2018 06:41 )             33.5     -    144  |  106  |  45<H>  ----------------------------<  136<H>  4.4   |  23  |  1.36<H>    Ca    8.8      16 Mar 2018 04:51  Phos  4.7       Mg     2.3     03-16    TPro  7.1  /  Alb  3.2<L>  /  TBili  0.4  /  DBili  x   /  AST  29  /  ALT  18  /  AlkPhos  135<H>  03-16    PT/INR - ( 15 Mar 2018 05:05 )   PT: 12.9 sec;   INR: 1.18 ratio         PTT - ( 15 Mar 2018 05:05 )  PTT:28.9 sec      RADIOLOGY & ADDITIONAL TESTS:    TELE:    EKG:

## 2018-03-16 NOTE — PROGRESS NOTE ADULT - PROBLEM SELECTOR PLAN 2
beta blocker initiated. no GI bleeding on Plavix only. continue beta blocker for now. GI to see and assess if patient candidate for anticoagulation? watchman device for PAF?

## 2018-03-16 NOTE — PROGRESS NOTE ADULT - SUBJECTIVE AND OBJECTIVE BOX
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   Wife at bedside provided the history.   Patient was very independent and in a good state of health until he had the carotid endarterectomy in July last year which was complicated by some type of bleeding resulting in a month long hospitalization at [facility]. Since the event he has had about 2-3 hospitalizations from other complications followed by rehab stays.  More recently, the patient has been doing better and living at home with his wife. Wife reports he is able to walk around the house without difficulty and mainly only needs help changing.   This morning, he woke up feeling very short of breath so they called EMS and he was brought to [facility]  Patient was placed on BiPAP at OSH w/ ABG showing PaO2 80 on CPAP 100%.   Given Duoneb, lasix 60mg IV, solumedrol 125mg     Wife reports he denied any F/C, CP, N/V, abd pain, cough, headache or dizziness.   Patient has chronic LE edema since his prior hospitalizations.   Wife also reports a large amount of urine come out when they place a fernández in the ED.     Patient had persistent bradycardia and was transferred to [facility] for possible PPM.     4/2017- stress test neg for ischemia, at that time in NSR w/ RBBB  3/2017- echo showed normal LV function w/ moderate concentric hypertrophy (13 Mar 2018 23:00)    Overnight: Patient had dual chamber ICD placement yesterday set to 80 BPM. Patient was electively intubated for the procedure. CE unremarkable with no uptrend.     MEDICATIONS  (STANDING):  artificial  tears Solution 1 Drop(s) Both EYES three times a day  atorvastatin 80 milliGRAM(s) Oral at bedtime  BACItracin   Ophthalmic Ointment 1 Application(s) Right EYE daily  brimonidine 0.2% Ophthalmic Solution 1 Drop(s) Right EYE two times a day  ceFAZolin   IVPB 1000 milliGRAM(s) IV Intermittent once  clopidogrel Tablet 75 milliGRAM(s) Oral daily  dextrose 5%. 1000 milliLiter(s) (50 mL/Hr) IV Continuous <Continuous>  dextrose 50% Injectable 12.5 Gram(s) IV Push once  dextrose 50% Injectable 25 Gram(s) IV Push once  dextrose 50% Injectable 25 Gram(s) IV Push once  dorzolamide 2%/timolol 0.5% Ophthalmic Solution 1 Drop(s) Both EYES two times a day  fentaNYL   Infusion 1 MICROgram(s)/kG/Hr (3.835 mL/Hr) IV Continuous <Continuous>  furosemide   Injectable 40 milliGRAM(s) IV Push two times a day  insulin lispro (HumaLOG) corrective regimen sliding scale   SubCutaneous at bedtime  insulin lispro (HumaLOG) corrective regimen sliding scale   SubCutaneous three times a day before meals  loteprednol 0.5% Ophthalmic Suspension 1 Drop(s) Both EYES two times a day  propofol Infusion 10 MICROgram(s)/kG/Min (4.602 mL/Hr) IV Continuous <Continuous>  tamsulosin 0.4 milliGRAM(s) Oral daily    MEDICATIONS  (PRN):  ALBUTerol/ipratropium for Nebulization 3 milliLiter(s) Nebulizer every 6 hours PRN Shortness of Breath and/or Wheezing  dextrose Gel 1 Dose(s) Oral once PRN Blood Glucose LESS THAN 70 milliGRAM(s)/deciliter  glucagon  Injectable 1 milliGRAM(s) IntraMuscular once PRN Glucose LESS THAN 70 milligrams/deciliter      REVIEW OF SYSTEMS:  Otherwise, 10 point ROS done and otherwise negative.    PHYSICAL EXAM:  Vital Signs Last 24 Hrs  T(C): 36.9 (16 Mar 2018 04:15), Max: 37.7 (15 Mar 2018 21:45)  T(F): 98.5 (16 Mar 2018 04:15), Max: 99.8 (15 Mar 2018 21:45)  HR: 90 (16 Mar 2018 07:00) (42 - 96)  BP: 142/67 (16 Mar 2018 07:00) (98/51 - 201/84)  BP(mean): 93 (16 Mar 2018 07:00) (66 - 140)  RR: 17 (16 Mar 2018 07:00) (12 - 35)  SpO2: 96% (16 Mar 2018 07:00) (89% - 100%)  I&O's Summary    15 Mar 2018 07:01  -  16 Mar 2018 07:00  --------------------------------------------------------  IN: 327.8 mL / OUT: 2685 mL / NET: -2357.2 mL    Appearance: Normal	  HEENT:   Normal oral mucosa, PERRL, EOMI	  Lymphatic: No lymphadenopathy  Cardiovascular: Normal paced rhythm, no murmurs rubs or gallops  Respiratory: Lungs clear to auscultation, no wheezes, rales or rhonchi 	  Psychiatry: A & O x 3, Mood & affect appropriate  Gastrointestinal:  Soft, Non-tender, + BS	  Skin: No rashes, No ecchymoses, No cyanosis	  Neurologic: Non-focal  Extremities: Normal range of motion, No clubbing, cyanosis or edema  Vascular: Peripheral pulses palpable 2+ bilaterally    LABS:	 	                        10.7   11.4  )-----------( 371      ( 16 Mar 2018 06:41 )             33.5     Auto Eosinophil # 0.1   / Auto Eosinophil % 0.9   / Auto Neutrophil # 8.2   / Auto Neutrophil % 72.6  / BANDS % x                            10.5   18.5  )-----------( 412      ( 15 Mar 2018 05:05 )             32.5     Auto Eosinophil # x     / Auto Eosinophil % x     / Auto Neutrophil # x     / Auto Neutrophil % x     / BANDS % x        INR: 1.18 ratio (03-15 @ 05:05)  INR: 1.23 ratio (03-13 @ 23:50)    03-16    144  |  106  |  45<H>  ----------------------------<  136<H>  4.4   |  23  |  1.36<H>  03-15    146<H>  |  107  |  45<H>  ----------------------------<  157<H>  3.8   |  21<L>  |  1.43<H>  03-15    141  |  105  |  50<H>  ----------------------------<  184<H>  4.1   |  22  |  1.64<H>    Ca    8.8      16 Mar 2018 04:51  Mg     2.3     03-16  Phos  4.7     03-16  TPro  7.1  /  Alb  3.2<L>  /  TBili  0.4  /  DBili  x   /  AST  29  /  ALT  18  /  AlkPhos  135<H>  03-16  TPro  7.0  /  Alb  3.3  /  TBili  0.4  /  DBili  x   /  AST  26  /  ALT  21  /  AlkPhos  131<H>  03-15  TPro  7.4  /  Alb  3.4  /  TBili  0.4  /  DBili  x   /  AST  18  /  ALT  16  /  AlkPhos  127<H>  03-15    Lactate, Blood: 1.3 mmol/L (03-13 @ 23:50)      proBNP: Serum Pro-Brain Natriuretic Peptide: 8615 pg/mL (03-13 @ 23:50)    Lipid Profile: 03-14 Chol 94 LDL 42 HDL 39<L> Trig 63  HgA1c: 5.9 % (03-14 @ 01:57)    TSH: Thyroid Stimulating Hormone, Serum: 1.05 uIU/mL (03-14 @ 01:57)      CARDIAC MARKERS:   16 Mar 2018 04:51 Troponin 0.08 ng/mL / Creatine Kinase 63 U/L /  CKMB 2.6 ng/mL / CPK Mass Assay % x       15 Mar 2018 22:14 Troponin 0.09 ng/mL / Creatine Kinase 44 U/L /  CKMB 2.5 ng/mL / CPK Mass Assay % x       13 Mar 2018 23:50 Troponin 0.10 ng/mL / Creatine Kinase 38 U/L /  CKMB 3.1 ng/mL / CPK Mass Assay % x        TELEMETRY: 	    ECG:  	  RADIOLOGY:  OTHER: 	    PREVIOUS DIAGNOSTIC TESTING:    [ ] Echocardiogram:  [ ]  Catheterization:  [ ] Stress Test:

## 2018-03-16 NOTE — PROGRESS NOTE ADULT - SUBJECTIVE AND OBJECTIVE BOX
EPS NP Note:    s/p [employer] ICD Implant 3/15/18  Left ICD site without hematoma  ICD teachings done with family  ID, booklet, discharge instructions given to family  Follow up wound check with Dr Jenni Coker

## 2018-03-16 NOTE — PROGRESS NOTE ADULT - ASSESSMENT
82 year male with history of HTN DM hyperlipidemia, PAF, h/o Carotid disease, possible PVD, presents with 1 day h/o acute shortness of breath, with complete heart block, sustained atrial flutter, and polymorphic VT. On lasix 40 IV BID after presented with severe acute pulmonary edema, appears improved.. Creatinine increased at 1.6. No further VT, but occasional VPB noted. CAth noted 100% RCA and 85% prox circ lesion. ICD check today shows small R waves when sensing bipolar? VPB , but within normal limits at 12 when sensing integrated ( tip to coil) threshold stable 0.5@ 0.5 impedance stable.

## 2018-03-16 NOTE — PROGRESS NOTE ADULT - ASSESSMENT
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     #Neuro- A&Ox3, [place] speaking only     #CVS  - s/p dual chamber ICD placement set to 80 BPM yesterday for multiple bouts of polymorphic VT with several morphologies, electively inubated for the procedure  - no overnight events on telemetry  - CE show no uptrend  - Cardiac Cath from yesterday showing chronic 100% stenotic lesion to the RCA with 85% tubular stenosis to the LCX - - will discuss with intervention whether to intervene on vessel   - on Plavix already, will restart ASA (as per wife - - no allergies to it, took it intermittently PTA  - will obtain better blood pressure control, pressures have been fluctuating throughout the night from 120s- 170s systolic.      #Pulm- Hypoxic respiratory failure in setting of pulmonary edema 2/2 ADHF  - electively intubated at this time  - CPAP trial this morning with plan to extubate today   - net -2.4 L yesterday with net negative 5.3 L since admission  - c/w lasix 40 IV BID     #GI  - will restart diet once successfully extubated today     #Renal  - creatinine trending down o 1.36<--1.43, consistent with creatinine bump 2/2 poor forward flow and HTN from days prior  - will continue to monitor for MICHAEL, pretreated with fluids before, during and after cath and PPM/ICD, received 400cc during procedure    #ID  - white count downtrending, likely elevated  2/2 dose of solumedrol in ED c/b stress response  - ancef ordered for procedure     #Endocrine  - LSSI started, A1c of 5.9 not on any medications at home   - hx of hypoglycemic episodes     #Glaucoma  - restarted timolol eye drops now that PPM is placed      Nabil Self  PGY1  [phone] 83 y/o M w/ PMHx of DM2, HTN, R sided endarterectomy (7/2017) c/p bleed, GI bleed s/p colonic 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     #Neuro- A&Ox3, Maltese speaking only     #CVS  - s/p dual chamber ICD placement set to 80 BPM yesterday for multiple bouts of polymorphic VT with several morphologies, electively inubated for the procedure  - no overnight events on telemetry  - CE show no uptrend  - Cardiac Cath from yesterday showing chronic 100% stenotic lesion to the RCA with 85% tubular stenosis to the LCX - - will discuss with intervention whether to intervene on vessel   - on Plavix already, will restart ASA (as per wife - - no allergies to it, took it intermittently PTA  - will obtain better blood pressure control, pressures have been fluctuating throughout the night from 120s- 170s systolic -- will start coreg BID   - Patient likely will need watchmen device given history of PAF/Aflutter and hx of hemotochezia/hematemesis (reported by wife) in the past (and was taken off ASA/plavix) and restarted on plavix given endarterectomy. Patient will need to be anticoagulated in the for a duration of time after watchmen is placed. Will consult GI regarding bleeding risk and role of performing EGD/Colonoscopy in this patient.      #Pulm- Hypoxic respiratory failure in setting of pulmonary edema 2/2 ADHF  - electively intubated at this time  - CPAP trial this morning with plan to extubate today   - net -2.4 L yesterday with net negative 5.3 L since admission  - c/w lasix 40 IV BID     #GI  - will restart diet once successfully extubated today   - will consult GI regarding risk of bleeding if we were to start Anticoagulation. Given hx of Afib/Aflutter - - will need watchmen device and will need to be anticoagulated for some time. Hx of hematemesis/hematochezia as per wife when he was on ASA/plavix after endarterectomy.     #Renal  - creatinine trending down o 1.36<--1.43, consistent with creatinine bump 2/2 poor forward flow and HTN from days prior  - will continue to monitor for MICHAEL, pretreated with fluids before, during and after cath and PPM/ICD, received 400cc during procedure    #ID  - white count downtrending, likely elevated  2/2 dose of solumedrol in ED c/b stress response  - ancef ordered for procedure     #Endocrine  - LSSI started, A1c of 5.9 not on any medications at home   - hx of hypoglycemic episodes     #Glaucoma  - restarted timolol eye drops now that PPM is placed      Nabil Self  PGY1  [phone]

## 2018-03-16 NOTE — CONSULT NOTE ADULT - ASSESSMENT
Impression:    1. History of GI bleed/possible peptic ulcer/possible history of colon polyp. Although the patient had prior severe GI bleed, there has been no GI bleeding recently for the last few months despite the patient being on plavix.    Recommendation:  -given the patient's cardiac comorbidities and lack of evidence of active GI bleeding at this time, it seems that the possiblity of finding an active upper GI lesion on endoscopy is low and is not justified in the setting of the patient's comorbid cardiac disease. It is preferable to proceed with anticoagulation which can be stopped if there is evidence of bleeding at which point endoscopy can be performed.    -CCU team to obtain records of prior endoscopic procedures    -PPI prophylaxis, test and treat for H. pylori (stool/serum tests)

## 2018-03-16 NOTE — CHART NOTE - NSCHARTNOTEFT_GEN_A_CORE
CHIEF COMPLAINT::    jHPI: 81 y/o M w/ PMHx of DM2, HTN, R sided endarterectomy (2017) c/p bleed, GI bleed s/p colonic tumor removal 2017, paroxysmal Afib/flutter presenting to OSH with worsening SOB. He woke up feeling very short of breath and was brought to [facility] by EMS.  Patient was placed on BiPAP at OSH w/ ABG showing PaO2 80 on CPAP 100%.   Given Duoneb, lasix 60mg IV, solumedrol 125mg.    Patient has chronic LE edema since his prior hospitalizations.   Patient had persistent bradycardia and was transferred to [facility] for possible PPM.     2017- stress test neg for ischemia, at that time in NSR w/ RBBB  3/2017- echo showed normal LV function w/ moderate concentric hypertrophy (13 Mar 2018 23:00)    Overnight: Patient had dual chamber ICD placement on 3/15/18, set to 80 BPM. Patient was electively intubated for the procedure. CE unremarkable with no uptrend.       REVIEW OF SYSTEMS: all others negative    MEDICATIONS  (STANDING):  artificial  tears Solution 1 Drop(s) Both EYES three times a day  atorvastatin 40 milliGRAM(s) Oral at bedtime  BACItracin   Ophthalmic Ointment 1 Application(s) Right EYE daily  brimonidine 0.2% Ophthalmic Solution 1 Drop(s) Right EYE two times a day  clopidogrel Tablet 75 milliGRAM(s) Oral daily  dextrose 5%. 1000 milliLiter(s) (50 mL/Hr) IV Continuous <Continuous>  dextrose 50% Injectable 12.5 Gram(s) IV Push once  dextrose 50% Injectable 25 Gram(s) IV Push once  dextrose 50% Injectable 25 Gram(s) IV Push once  dorzolamide 2%/timolol 0.5% Ophthalmic Solution 1 Drop(s) Both EYES two times a day  furosemide   Injectable 40 milliGRAM(s) IV Push two times a day  insulin lispro (HumaLOG) corrective regimen sliding scale   SubCutaneous at bedtime  insulin lispro (HumaLOG) corrective regimen sliding scale   SubCutaneous three times a day before meals  loteprednol 0.5% Ophthalmic Suspension 1 Drop(s) Both EYES two times a day  tamsulosin 0.4 milliGRAM(s) Oral daily    MEDICATIONS  (PRN):  ALBUTerol/ipratropium for Nebulization 3 milliLiter(s) Nebulizer every 6 hours PRN Shortness of Breath and/or Wheezing  dextrose Gel 1 Dose(s) Oral once PRN Blood Glucose LESS THAN 70 milliGRAM(s)/deciliter  glucagon  Injectable 1 milliGRAM(s) IntraMuscular once PRN Glucose LESS THAN 70 milligrams/deciliter      Objective:  Vital Signs Last 24 Hrs  T(C): 36.9 (16 Mar 2018 18:00), Max: 37.7 (15 Mar 2018 21:45)  T(F): 98.4 (16 Mar 2018 18:00), Max: 99.8 (15 Mar 2018 21:45)  HR: 96 (16 Mar 2018 18:00) (78 - 102)  BP: 178/75 (16 Mar 2018 18:00) (98/51 - 193/81)  BP(mean): 120 (16 Mar 2018 18:00) (66 - 128)  RR: 22 (16 Mar 2018 18:00) (11 - 28)  SpO2: 94% (16 Mar 2018 18:00) (93% - 100%)  ICU Vital Signs Last 24 Hrs  T(C): 36.9 (16 Mar 2018 18:00), Max: 37.7 (15 Mar 2018 21:45)  T(F): 98.4 (16 Mar 2018 18:00), Max: 99.8 (15 Mar 2018 21:45)  HR: 96 (16 Mar 2018 18:00) (78 - 102)  BP: 178/75 (16 Mar 2018 18:00) (98/51 - 193/81)  BP(mean): 120 (16 Mar 2018 18:00) (66 - 128)  ABP: --  ABP(mean): --  RR: 22 (16 Mar 2018 18:00) (11 - 28)  SpO2: 94% (16 Mar 2018 18:00) (93% - 100%)    Mode: 40% amask    Adult Advanced Hemodynamics Last 24 Hrs  CVP(mm Hg): --  CVP(cm H2O): --  CO: --  CI: --  PA: --  PA(mean): --  PCWP: --  SVR: --  SVRI: --  PVR: --  PVRI: --      15 @ 07:01  -  03-16 @ 07:00  --------------------------------------------------------  IN: 338.5 mL / OUT: 2735 mL / NET: -2396.5 mL     @ 07:01  -   @ 19:08  --------------------------------------------------------  IN: 720 mL / OUT: 1825 mL / NET: -1105 mL      Daily     Daily Weight in k.9 (16 Mar 2018 04:15)    PHYSICAL EXAM:      Constitutional:    Eyes:    ENMT:    Neck:    Breasts:    Back:    Respiratory:    Cardiovascular:    Gastrointestinal:    Genitourinary:    Rectal:    Extremities:    Vascular:    Neurological:    Skin:    Lymph Nodes:    Musculoskeletal:    Psychiatric:        TELEMETRY:     EKG:     CARDIAC CATH:     ECHO:    IMAGING:                           10.7   11.4  )-----------( 371      ( 16 Mar 2018 06:41 )             33.5         144  |  106  |  45<H>  ----------------------------<  136<H>  4.4   |  23  |  1.36<H>    Ca    8.8      16 Mar 2018 04:51  Phos  4.7       Mg     2.3         TPro  7.1  /  Alb  3.2<L>  /  TBili  0.4  /  DBili  x   /  AST  29  /  ALT  18  /  AlkPhos  135<H>      LIVER FUNCTIONS - ( 16 Mar 2018 04:51 )  Alb: 3.2 g/dL / Pro: 7.1 g/dL / ALK PHOS: 135 U/L / ALT: 18 U/L RC / AST: 29 U/L / GGT: x           PT/INR - ( 15 Mar 2018 05:05 )   PT: 12.9 sec;   INR: 1.18 ratio         PTT - ( 15 Mar 2018 05:05 )  PTT:28.9 sec  CKMB Units: 2.6 ng/mL ( @ 04:51)  Creatine Kinase, Serum: 63 U/L ( @ 04:51)  Troponin T, Serum: 0.08 ng/mL ( @ 04:51)  CKMB Units: 2.5 ng/mL (03-15 @ 22:14)  Creatine Kinase, Serum: 44 U/L (03-15 @ 22:14)  Troponin T, Serum: 0.09 ng/mL (03-15 @ 22:14)    ABG - ( 16 Mar 2018 04:40 )  pH: 7.47  /  pCO2: 35    /  pO2: 112   / HCO3: 25    / Base Excess: 1.8   /  SaO2: 98                *BLOOD GAS/ARTERIAL/MIXED/VENOUS  *LACTATE      HEALTH ISSUES - PROBLEM Dx:  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      Shanthi Dudley MD CCU Medicine Intern

## 2018-03-16 NOTE — CHART NOTE - NSCHARTNOTEFT_GEN_A_CORE
====================  CCU MIDNIGHT ROUNDS  ====================    MANI AYALA  [id number]    ====================  SUMMARY:   81 y/o M w/ PMHx of DM2, HTN, R sided endarterectomy (7/2017) c/p bleed, GI bleed s/p colonic tumor removal 12/2017, paroxysmal Afib/flutter presenting to OSH with worsening SOB. Patient was very independent and in a good state of health until he had the carotid endarterectomy in July last year which was complicated by some type of bleeding resulting in a month long hospitalization at [facility]. Since the event he has had about 2-3 hospitalizations from other complications followed by rehab stays.This morning, he woke up feeling very short of breath so [facility]  Patient had persistent bradycardia and was transferred to [facility] for possible PPM on 3/13/2018. Pt underwent cath to rule out ischemic cause and found to have complete occlusion of the RCA and 85% L Cx. No interventionw as performed and pt underwent ICD/PPM on 3/15.   ====================    ====================  NEW EVENTS:  ====================        ====================  VITALS (Last 12 hrs):  ====================    T(C): 37.3 (03-15-18 @ 22:30), Max: 37.7 (03-15-18 @ 21:45)  HR: 80 (03-15-18 @ 23:40) (42 - 96)  BP: 133/62 (03-15-18 @ 23:30) (98/51 - 182/63)  BP(mean): 83 (03-15-18 @ 23:30) (66 - 138)  ABP: --  ABP(mean): --  RR: 15 (03-15-18 @ 23:30) (14 - 35)  SpO2: 99% (03-15-18 @ 23:40) (92% - 99%)  Wt(kg): --  CVP(mm Hg): --  CO: --  CI: --  PA: --  PA(mean): --  PA(direct): --  PCWP: --  SVR: --    TELEMETRY:    Mode: AC/ CMV (Assist Control/ Continuous Mandatory Ventilation)  RR (machine): 12  TV (machine): 500  FiO2: 40  PEEP: 5  ITime: 1  MAP: 9  PIP: 24      *BLOOD GAS/ARTERIAL/MIXED/VENOUS  *LACTATE    I&O's Summary    14 Mar 2018 07:01  -  15 Mar 2018 07:00  --------------------------------------------------------  IN: 840 mL / OUT: 2280 mL / NET: -1440 mL    15 Mar 2018 07:01  -  16 Mar 2018 00:02  --------------------------------------------------------  IN: 243.7 mL / OUT: 1560 mL / NET: -1316.3 mL        ====================  PLAN:  ====================    HEALTH ISSUES - PROBLEM Dx:  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      ADALI Garrett NP   # ====================  CCU MIDNIGHT ROUNDS  ====================    MANI AYALA  [id number]    ====================  SUMMARY:   83 y/o M w/ PMHx of DM2, HTN, R sided endarterectomy (7/2017) c/p bleed, GI bleed s/p colonic tumor removal 12/2017, paroxysmal Afib/flutter presenting to OSH with worsening SOB. Patient was very independent and in a good state of health until he had the carotid endarterectomy in July last year which was complicated by some type of bleeding resulting in a month long hospitalization at [employer]. Since the event he has had about 2-3 hospitalizations from other complications followed by rehab stays.This morning, he woke up feeling very short of breath so [facility]  Patient had persistent bradycardia and was transferred to [facility] for possible PPM on 3/13/2018. Pt underwent cath to rule out ischemic cause and found to have complete occlusion of the RCA and 85% L Cx. No interventionw as performed and pt underwent ICD/PPM on 3/15.   ====================    ====================  NEW EVENTS: Pt underwent ICD and needed to be intubated for agitation/airway protection. Pt returned hemodynamically stable sedated and intubated.   ====================    ====================  VITALS (Last 12 hrs):  ====================    T(C): 37.3 (03-15-18 @ 22:30), Max: 37.7 (03-15-18 @ 21:45)  HR: 80 (03-15-18 @ 23:40) (42 - 96)  BP: 133/62 (03-15-18 @ 23:30) (98/51 - 182/63)  BP(mean): 83 (03-15-18 @ 23:30) (66 - 138)  RR: 15 (03-15-18 @ 23:30) (14 - 35)  SpO2: 99% (03-15-18 @ 23:40) (92% - 99%)    TELEMETRY: V paced 80    Mode: AC/ CMV (Assist Control/ Continuous Mandatory Ventilation)  RR (machine): 12  TV (machine): 500  FiO2: 40  PEEP: 5  ITime: 1  MAP: 9  PIP: 24    I&O's Summary    14 Mar 2018 07:01  -  15 Mar 2018 07:00  --------------------------------------------------------  IN: 840 mL / OUT: 2280 mL / NET: -1440 mL    15 Mar 2018 07:01  -  16 Mar 2018 00:02  --------------------------------------------------------  IN: 243.7 mL / OUT: 1560 mL / NET: -1316.3 mL    ====================  PLAN:  ====================  Agitation requiring intubation during ICD placement: [facility] Pressure support trails in AM. Continue prop and fent overnight.   A flutter with slow VT c/b polymorphic VT now s/p VVI PM/ICD: V paced at 80. Chest XR w/o pneumo. Continue lasix IV BID for now. E/O fluid overload on XR.   CAD: Continue lipitor.      Janine Peters, CCU NP   #

## 2018-03-16 NOTE — CONSULT NOTE ADULT - ATTENDING COMMENTS
As above.    Impression:    #1.  History of GI bleeding.  Per family report, upper GI bleed in setting of ASA/Plavix which was likely peptic ulcer disease by endoscopy, also lower GI bleed after colonoscopy/polypectomy.  Patient without overt GI bleeding on outpatient Plavix monotherapy.    #2.  Patient with bradycardia/ventricular tachycardia, now s/p pacer/AICD, being evaluated for Watchman procedure by electrophysiology, with need for anticoagulation postprocedure.    #3.  Coronary artery disease, s/p diagnostic cardiac cath demonstrating 100% RCA occlusion and 85% LCx occlusion.      Recommendation:    #1.  Based on available information from family (no primary endoscopic reports or discharge summaries from hospitalization at OSH), patient likely had significant GI bleed from aspirin-associated peptic ulcer disease and post-polypectomy GI bleed.  Given that patient has been on Plavix monotherapy, chance of recurrent ulcer is very small, and chance of ulcer with high risk of recurrent bleeding is even smaller given lack of bleeding on Plavix.  Likewise, if bleeding occurred from colonic polypectomy site, this would have completely healed. As above.    Impression:    #1.  History of GI bleeding.  Per family report, upper GI bleed in setting of ASA/Plavix which was likely peptic ulcer disease by endoscopy, also lower GI bleed after colonoscopy/polypectomy.  Patient without overt GI bleeding on outpatient Plavix monotherapy.    #2.  Normocytic anemia in setting of renal insufficiency (Cr around 1.5).    #3.  Patient with bradycardia/ventricular tachycardia, now s/p pacer/AICD, being evaluated for Watchman procedure by electrophysiology, with need for anticoagulation postprocedure.    #4.  Coronary artery disease, s/p diagnostic cardiac cath demonstrating 100% RCA occlusion and 85% LCx occlusion.      Recommendation:    Based on available information from family (no primary endoscopic reports or discharge summaries from hospitalization at OSH), patient likely had significant GI bleed from aspirin-associated peptic ulcer disease and post-polypectomy GI bleed.  Given that patient has been on Plavix monotherapy, chance of recurrent ulcer is very small, and chance of ulcer with high risk of recurrent bleeding is even smaller given lack of overt GI bleeding on Plavix.  Likewise, if bleeding occurred from colonic polypectomy site, this would have completely healed and pt is at no risk for recurrence.    Obtain details of prior EGD/colonoscopy/hospitalizations which involved GI bleeding    Check H.pylori stool antigen and H. pylori serum antibody, treat if positive.    Iron studies (iron / iron saturation / ferritin) to look for component of iron deficiency which, if present, would increase the chance of having a GI source of anemia.    Empiric high dose proton pump inhibitor, such as pantoprazole 40 mg daily or omeprazole 40 mg daily indefinitely while on antiplatelet agents.    Further recommendations based on clinical course, results of above workup, review of medical records, and discussion with cardiology/EPS.

## 2018-03-17 DIAGNOSIS — N17.9 ACUTE KIDNEY FAILURE, UNSPECIFIED: ICD-10-CM

## 2018-03-17 DIAGNOSIS — I50.9 HEART FAILURE, UNSPECIFIED: ICD-10-CM

## 2018-03-17 DIAGNOSIS — D72.829 ELEVATED WHITE BLOOD CELL COUNT, UNSPECIFIED: ICD-10-CM

## 2018-03-17 DIAGNOSIS — Z29.9 ENCOUNTER FOR PROPHYLACTIC MEASURES, UNSPECIFIED: ICD-10-CM

## 2018-03-17 DIAGNOSIS — K26.9 DUODENAL ULCER, UNSPECIFIED AS ACUTE OR CHRONIC, WITHOUT HEMORRHAGE OR PERFORATION: ICD-10-CM

## 2018-03-17 DIAGNOSIS — I10 ESSENTIAL (PRIMARY) HYPERTENSION: ICD-10-CM

## 2018-03-17 DIAGNOSIS — R51 HEADACHE: ICD-10-CM

## 2018-03-17 DIAGNOSIS — J96.01 ACUTE RESPIRATORY FAILURE WITH HYPOXIA: ICD-10-CM

## 2018-03-17 DIAGNOSIS — J90 PLEURAL EFFUSION, NOT ELSEWHERE CLASSIFIED: ICD-10-CM

## 2018-03-17 DIAGNOSIS — E11.9 TYPE 2 DIABETES MELLITUS WITHOUT COMPLICATIONS: ICD-10-CM

## 2018-03-17 LAB
ALBUMIN SERPL ELPH-MCNC: 3.3 G/DL — SIGNIFICANT CHANGE UP (ref 3.3–5)
ALP SERPL-CCNC: 150 U/L — HIGH (ref 40–120)
ALT FLD-CCNC: 18 U/L RC — SIGNIFICANT CHANGE UP (ref 10–45)
ANION GAP SERPL CALC-SCNC: 13 MMOL/L — SIGNIFICANT CHANGE UP (ref 5–17)
AST SERPL-CCNC: 24 U/L — SIGNIFICANT CHANGE UP (ref 10–40)
BASOPHILS NFR BLD AUTO: 0 % — SIGNIFICANT CHANGE UP (ref 0–2)
BILIRUB SERPL-MCNC: 0.6 MG/DL — SIGNIFICANT CHANGE UP (ref 0.2–1.2)
BUN SERPL-MCNC: 39 MG/DL — HIGH (ref 7–23)
CALCIUM SERPL-MCNC: 8.9 MG/DL — SIGNIFICANT CHANGE UP (ref 8.4–10.5)
CHLORIDE SERPL-SCNC: 103 MMOL/L — SIGNIFICANT CHANGE UP (ref 96–108)
CO2 SERPL-SCNC: 26 MMOL/L — SIGNIFICANT CHANGE UP (ref 22–31)
CREAT SERPL-MCNC: 1.41 MG/DL — HIGH (ref 0.5–1.3)
EOSINOPHIL NFR BLD AUTO: 2.6 % — SIGNIFICANT CHANGE UP (ref 0–6)
GAS PNL BLDV: SIGNIFICANT CHANGE UP
GLUCOSE BLDC GLUCOMTR-MCNC: 169 MG/DL — HIGH (ref 70–99)
GLUCOSE BLDC GLUCOMTR-MCNC: 175 MG/DL — HIGH (ref 70–99)
GLUCOSE BLDC GLUCOMTR-MCNC: 193 MG/DL — HIGH (ref 70–99)
GLUCOSE BLDC GLUCOMTR-MCNC: 260 MG/DL — HIGH (ref 70–99)
GLUCOSE SERPL-MCNC: 198 MG/DL — HIGH (ref 70–99)
HCT VFR BLD CALC: 36.9 % — LOW (ref 39–50)
HGB BLD-MCNC: 11.7 G/DL — LOW (ref 13–17)
IRON SATN MFR SERPL: 13 UG/DL — LOW (ref 45–165)
IRON SATN MFR SERPL: 7 % — LOW (ref 16–55)
LYMPHOCYTES # BLD AUTO: 1.49 K/UL — SIGNIFICANT CHANGE UP (ref 1–3.3)
LYMPHOCYTES # BLD AUTO: 9.6 % — LOW (ref 13–44)
MAGNESIUM SERPL-MCNC: 2.2 MG/DL — SIGNIFICANT CHANGE UP (ref 1.6–2.6)
MCHC RBC-ENTMCNC: 26.4 PG — LOW (ref 27–34)
MCHC RBC-ENTMCNC: 31.7 GM/DL — LOW (ref 32–36)
MCV RBC AUTO: 83.3 FL — SIGNIFICANT CHANGE UP (ref 80–100)
MONOCYTES NFR BLD AUTO: 9.6 % — SIGNIFICANT CHANGE UP (ref 2–14)
NEUTROPHILS # BLD AUTO: 12.02 K/UL — HIGH (ref 1.8–7.4)
NEUTROPHILS NFR BLD AUTO: 77.4 % — HIGH (ref 43–77)
PHOSPHATE SERPL-MCNC: 3.6 MG/DL — SIGNIFICANT CHANGE UP (ref 2.5–4.5)
PLATELET # BLD AUTO: 382 K/UL — SIGNIFICANT CHANGE UP (ref 150–400)
POTASSIUM SERPL-MCNC: 3.7 MMOL/L — SIGNIFICANT CHANGE UP (ref 3.5–5.3)
POTASSIUM SERPL-SCNC: 3.7 MMOL/L — SIGNIFICANT CHANGE UP (ref 3.5–5.3)
PROT SERPL-MCNC: 7.3 G/DL — SIGNIFICANT CHANGE UP (ref 6–8.3)
RBC # BLD: 4.43 M/UL — SIGNIFICANT CHANGE UP (ref 4.2–5.8)
RBC # FLD: 17.7 % — HIGH (ref 10.3–14.5)
SODIUM SERPL-SCNC: 142 MMOL/L — SIGNIFICANT CHANGE UP (ref 135–145)
TIBC SERPL-MCNC: 187 UG/DL — LOW (ref 220–430)
UIBC SERPL-MCNC: 174 UG/DL — SIGNIFICANT CHANGE UP (ref 110–370)
WBC # BLD: 15.53 K/UL — HIGH (ref 3.8–10.5)
WBC # FLD AUTO: 15.53 K/UL — HIGH (ref 3.8–10.5)

## 2018-03-17 PROCEDURE — 99233 SBSQ HOSP IP/OBS HIGH 50: CPT

## 2018-03-17 PROCEDURE — 93010 ELECTROCARDIOGRAM REPORT: CPT

## 2018-03-17 PROCEDURE — 71250 CT THORAX DX C-: CPT | Mod: 26

## 2018-03-17 PROCEDURE — 99223 1ST HOSP IP/OBS HIGH 75: CPT | Mod: GC

## 2018-03-17 RX ORDER — ACETAMINOPHEN 500 MG
1000 TABLET ORAL ONCE
Qty: 0 | Refills: 0 | Status: COMPLETED | OUTPATIENT
Start: 2018-03-17 | End: 2018-03-17

## 2018-03-17 RX ORDER — POLYETHYLENE GLYCOL 3350 17 G/17G
17 POWDER, FOR SOLUTION ORAL DAILY
Qty: 0 | Refills: 0 | Status: DISCONTINUED | OUTPATIENT
Start: 2018-03-17 | End: 2018-03-23

## 2018-03-17 RX ORDER — PANTOPRAZOLE SODIUM 20 MG/1
40 TABLET, DELAYED RELEASE ORAL
Qty: 0 | Refills: 0 | Status: DISCONTINUED | OUTPATIENT
Start: 2018-03-17 | End: 2018-03-20

## 2018-03-17 RX ORDER — DOCUSATE SODIUM 100 MG
100 CAPSULE ORAL
Qty: 0 | Refills: 0 | Status: DISCONTINUED | OUTPATIENT
Start: 2018-03-17 | End: 2018-03-23

## 2018-03-17 RX ORDER — ASCORBIC ACID 60 MG
500 TABLET,CHEWABLE ORAL DAILY
Qty: 0 | Refills: 0 | Status: DISCONTINUED | OUTPATIENT
Start: 2018-03-17 | End: 2018-03-23

## 2018-03-17 RX ORDER — PIPERACILLIN AND TAZOBACTAM 4; .5 G/20ML; G/20ML
4.5 INJECTION, POWDER, LYOPHILIZED, FOR SOLUTION INTRAVENOUS EVERY 12 HOURS
Qty: 0 | Refills: 0 | Status: DISCONTINUED | OUTPATIENT
Start: 2018-03-17 | End: 2018-03-17

## 2018-03-17 RX ORDER — PIPERACILLIN AND TAZOBACTAM 4; .5 G/20ML; G/20ML
3.38 INJECTION, POWDER, LYOPHILIZED, FOR SOLUTION INTRAVENOUS EVERY 8 HOURS
Qty: 0 | Refills: 0 | Status: DISCONTINUED | OUTPATIENT
Start: 2018-03-17 | End: 2018-03-18

## 2018-03-17 RX ORDER — FERROUS SULFATE 325(65) MG
325 TABLET ORAL DAILY
Qty: 0 | Refills: 0 | Status: DISCONTINUED | OUTPATIENT
Start: 2018-03-17 | End: 2018-03-23

## 2018-03-17 RX ORDER — SENNA PLUS 8.6 MG/1
2 TABLET ORAL AT BEDTIME
Qty: 0 | Refills: 0 | Status: DISCONTINUED | OUTPATIENT
Start: 2018-03-17 | End: 2018-03-23

## 2018-03-17 RX ORDER — VANCOMYCIN HCL 1 G
1000 VIAL (EA) INTRAVENOUS EVERY 24 HOURS
Qty: 0 | Refills: 0 | Status: DISCONTINUED | OUTPATIENT
Start: 2018-03-17 | End: 2018-03-18

## 2018-03-17 RX ORDER — ACETAMINOPHEN 500 MG
650 TABLET ORAL EVERY 6 HOURS
Qty: 0 | Refills: 0 | Status: DISCONTINUED | OUTPATIENT
Start: 2018-03-17 | End: 2018-03-23

## 2018-03-17 RX ADMIN — Medication 650 MILLIGRAM(S): at 05:06

## 2018-03-17 RX ADMIN — DORZOLAMIDE HYDROCHLORIDE TIMOLOL MALEATE 1 DROP(S): 20; 5 SOLUTION/ DROPS OPHTHALMIC at 05:11

## 2018-03-17 RX ADMIN — POLYETHYLENE GLYCOL 3350 17 GRAM(S): 17 POWDER, FOR SOLUTION ORAL at 12:12

## 2018-03-17 RX ADMIN — CLOPIDOGREL BISULFATE 75 MILLIGRAM(S): 75 TABLET, FILM COATED ORAL at 12:12

## 2018-03-17 RX ADMIN — CARVEDILOL PHOSPHATE 12.5 MILLIGRAM(S): 80 CAPSULE, EXTENDED RELEASE ORAL at 05:06

## 2018-03-17 RX ADMIN — Medication 1 DROP(S): at 22:12

## 2018-03-17 RX ADMIN — DORZOLAMIDE HYDROCHLORIDE TIMOLOL MALEATE 1 DROP(S): 20; 5 SOLUTION/ DROPS OPHTHALMIC at 19:21

## 2018-03-17 RX ADMIN — Medication 40 MILLIGRAM(S): at 22:12

## 2018-03-17 RX ADMIN — CARVEDILOL PHOSPHATE 12.5 MILLIGRAM(S): 80 CAPSULE, EXTENDED RELEASE ORAL at 18:47

## 2018-03-17 RX ADMIN — Medication 1: at 08:54

## 2018-03-17 RX ADMIN — Medication 100 MILLIGRAM(S): at 18:47

## 2018-03-17 RX ADMIN — Medication 325 MILLIGRAM(S): at 12:12

## 2018-03-17 RX ADMIN — ATORVASTATIN CALCIUM 40 MILLIGRAM(S): 80 TABLET, FILM COATED ORAL at 22:12

## 2018-03-17 RX ADMIN — Medication 3: at 12:11

## 2018-03-17 RX ADMIN — LOTEPREDNOL ETABONATE 1 DROP(S): 2 SUSPENSION/ DROPS OPHTHALMIC at 05:11

## 2018-03-17 RX ADMIN — LOTEPREDNOL ETABONATE 1 DROP(S): 2 SUSPENSION/ DROPS OPHTHALMIC at 17:12

## 2018-03-17 RX ADMIN — TAMSULOSIN HYDROCHLORIDE 0.4 MILLIGRAM(S): 0.4 CAPSULE ORAL at 22:12

## 2018-03-17 RX ADMIN — Medication 650 MILLIGRAM(S): at 06:30

## 2018-03-17 RX ADMIN — PANTOPRAZOLE SODIUM 40 MILLIGRAM(S): 20 TABLET, DELAYED RELEASE ORAL at 06:10

## 2018-03-17 RX ADMIN — Medication 1: at 17:10

## 2018-03-17 RX ADMIN — Medication 1 DROP(S): at 15:11

## 2018-03-17 RX ADMIN — BRIMONIDINE TARTRATE 1 DROP(S): 2 SOLUTION/ DROPS OPHTHALMIC at 19:20

## 2018-03-17 RX ADMIN — Medication 1 DROP(S): at 05:06

## 2018-03-17 RX ADMIN — BACITRACIN 1 APPLICATION(S): 500 OINTMENT OPHTHALMIC at 12:12

## 2018-03-17 RX ADMIN — BRIMONIDINE TARTRATE 1 DROP(S): 2 SOLUTION/ DROPS OPHTHALMIC at 05:11

## 2018-03-17 RX ADMIN — Medication 40 MILLIGRAM(S): at 12:11

## 2018-03-17 NOTE — PROGRESS NOTE ADULT - SUBJECTIVE AND OBJECTIVE BOX
INTERVAL HPI/OVERNIGHT EVENTS: patient noted with intermittent confusion. No chest pain. Appears mildly tachypneic CT chest  with loculated right pleural effusion. WBC increasing. CT also shows trace pericardial effusion. The R wave tip to rng is reduced, and the R wave tip to coil is preserved and leads appear in good position on chest XRAY  and CT chest. Has developed sustained atrial fibrillation.     MEDICATIONS  (STANDING):  artificial  tears Solution 1 Drop(s) Both EYES three times a day  ascorbic acid 500 milliGRAM(s) Oral daily  atorvastatin 40 milliGRAM(s) Oral at bedtime  BACItracin   Ophthalmic Ointment 1 Application(s) Right EYE daily  brimonidine 0.2% Ophthalmic Solution 1 Drop(s) Right EYE two times a day  carvedilol 12.5 milliGRAM(s) Oral every 12 hours  dextrose 5%. 1000 milliLiter(s) (50 mL/Hr) IV Continuous <Continuous>  dextrose 50% Injectable 12.5 Gram(s) IV Push once  dextrose 50% Injectable 25 Gram(s) IV Push once  dextrose 50% Injectable 25 Gram(s) IV Push once  docusate sodium 100 milliGRAM(s) Oral two times a day  dorzolamide 2%/timolol 0.5% Ophthalmic Solution 1 Drop(s) Both EYES two times a day  ferrous    sulfate 325 milliGRAM(s) Oral daily  furosemide   Injectable 40 milliGRAM(s) IV Push two times a day  insulin lispro (HumaLOG) corrective regimen sliding scale   SubCutaneous at bedtime  insulin lispro (HumaLOG) corrective regimen sliding scale   SubCutaneous three times a day before meals  loteprednol 0.5% Ophthalmic Suspension 1 Drop(s) Both EYES two times a day  pantoprazole    Tablet 40 milliGRAM(s) Oral before breakfast  polyethylene glycol 3350 17 Gram(s) Oral daily  senna 2 Tablet(s) Oral at bedtime  tamsulosin 0.4 milliGRAM(s) Oral daily    MEDICATIONS  (PRN):  acetaminophen   Tablet. 650 milliGRAM(s) Oral every 6 hours PRN Mild and/or moderate and/or severe pain  ALBUTerol/ipratropium for Nebulization 3 milliLiter(s) Nebulizer every 6 hours PRN Shortness of Breath and/or Wheezing  dextrose Gel 1 Dose(s) Oral once PRN Blood Glucose LESS THAN 70 milliGRAM(s)/deciliter  glucagon  Injectable 1 milliGRAM(s) IntraMuscular once PRN Glucose LESS THAN 70 milligrams/deciliter      Allergies    No Known Allergies    Intolerances      ROS:  General: Pt denies recent weight loss/fever/chills    Neurological: denies numbness or  sensation loss    Cardiovascular: denies chest pain/palpitations/leg edema    Respiratory and Thorax: denies SOB/cough/wheezing    Gastrointestinal: denies abdominal pain/diarrhea/constipation/bloody stool    Genitourinary: denies urinary frequency/urgency/ dysuria    Musculoskeletal: denies joint pain or swelling, denies restricted motion    Hematologic: denies abnormal bleeding  	    	  	    		        	    	            Vital Signs Last 24 Hrs  T(C): 36.4 (17 Mar 2018 19:59), Max: 36.9 (17 Mar 2018 14:10)  T(F): 97.5 (17 Mar 2018 19:59), Max: 98.5 (17 Mar 2018 14:10)  HR: 68 (17 Mar 2018 19:59) (66 - 83)  BP: 147/79 (17 Mar 2018 19:59) (108/60 - 158/81)  BP(mean): --  RR: 18 (17 Mar 2018 19:59) (18 - 18)  SpO2: 96% (17 Mar 2018 19:59) (93% - 99%)  Daily     Daily     03-16 @ 07:01  -  03-17 @ 07:00  --------------------------------------------------------  IN: 1220 mL / OUT: 3045 mL / NET: -1825 mL    03-17 @ 07:01  -  03-17 @ 21:49  --------------------------------------------------------  IN: 480 mL / OUT: 150 mL / NET: 330 mL      Physical Exam:    WD male   jvp mildly elevated  Cor rrr  lung decreased breath sounds on right  abdomen soft  ext no edema      LABS:                        11.7   15.53 )-----------( 382      ( 17 Mar 2018 08:29 )             36.9     03-17    142  |  103  |  39<H>  ----------------------------<  198<H>  3.7   |  26  |  1.41<H>    Ca    8.9      17 Mar 2018 06:20  Phos  3.6     03-17  Mg     2.2     03-17    TPro  7.3  /  Alb  3.3  /  TBili  0.6  /  DBili  x   /  AST  24  /  ALT  18  /  AlkPhos  150<H>  03-17          RADIOLOGY & ADDITIONAL TESTS:    TELE:    EKG:

## 2018-03-17 NOTE — PROGRESS NOTE ADULT - PROBLEM SELECTOR PLAN 5
continue treatment with lasix, consider pulmonary evaluation, consider coverage for possible pneumonia as patient required urgent intubation during ICD implant.

## 2018-03-17 NOTE — PROGRESS NOTE ADULT - ASSESSMENT
83 yo [place]ian speaking M PMHx DMT2, HTN, R endarterectomy (7/2017) c/b bleed, partial SBO, admission 10/2017 with septic shock and respiratory failure c/ chest tube and pleurodesis, admitted Jan 2018 for anemia s/p emergent EGD (hg of 5.4) with evidence duodenal ulcers/duodenitis/sessile duodenal polyp w/ elevated troponins, renal failure, CXR concerning for persistent RLL process and effusion, paroxysmal Afib/flutter presenting from OSH as CCU transfer for ADHF in setting of ?hypertensive emergency c/b aflutter with variable block/junctional escape and polymorphic VT. Now s/p AICD requiring intubation for airway protection, no further episodes of VT. s/p LHC with 85% stenosis of proximal circ. and  of pRCA without intervention. 81 yo [place]an speaking M PMHx DMT2, HTN, R endarterectomy (7/2017) c/b bleed, partial SBO, admission 10/2017 with septic shock and respiratory failure c/ chest tube and pleurodesis, admitted Jan 2018 for anemia s/p emergent EGD (hg of 5.4) with evidence duodenal ulcers/duodenitis/sessile duodenal polyp w/ elevated troponins, renal failure, CXR concerning for persistent RLL process and effusion, paroxysmal Afib/flutter presenting from OSH as CCU transfer for ADHF in setting of ?hypertensive emergency c/b aflutter with variable block/junctional escape and polymorphic VT now s/p dual chamber AICD requiring intubation for airway protection, no further episodes of VT. s/p LHC with 85% stenosis of proximal circ. and  of pRCA without intervention.

## 2018-03-17 NOTE — PROGRESS NOTE ADULT - PROBLEM SELECTOR PLAN 1
- CT chest  - Consider pulmonary consult in AM  - Continue on lasix at 40 IVP BID  - Strict I/Os and daily weights  - Net negative 1.3 L over last 24 hours  - Continue PRN duonebs  - VBG with AM laboratories - Continue on lasix at 40 IVP BID  - Strict I/Os and daily weights  - Continue PRN duonebs  -f/u CT chest for continuing hypoxia, consider pulm consult

## 2018-03-17 NOTE — PROGRESS NOTE ADULT - PROBLEM SELECTOR PLAN 5
- Please see physical chart for review of outside medical records  - Starting PPI protonix 40  - H pylori stool antigen and antibody (no evidence of H pylori on pathology report however)  - Iron studies per GI - Please see physical chart for review of OSH records  - c/w PPI  - H pylori stool antigen and antibody (no evidence of H pylori on pathology report however)  - f/u iron studies, whether anticoagulation or further scoping per GI

## 2018-03-17 NOTE — PROGRESS NOTE ADULT - ASSESSMENT
82 year male with history of HTN DM hyperlipidemia, PAF, h/o Carotid disease, possible PVD, presents with 1 day h/o acute shortness of breath, with complete heart block, sustained atrial flutter, and polymorphic VT. On lasix 40 IV BID after presented with severe acute pulmonary edema, appears improved.. Creatinine improved at 1.4. No further VT, but occasional VPB noted. CAth noted 100% RCA and 85% prox circ lesion.     ICD check today shows small R waves when sensing bipolar, , but within normal limits at 12 when sensing integrated ( tip to coil) threshold stable 0.5@ 0.5 impedance stable.  Trace pericardial effusion

## 2018-03-17 NOTE — PROGRESS NOTE ADULT - PROBLEM SELECTOR PLAN 4
- Rate well controlled (60s-90s)  - Will need to touch base with GI in AM to review endoscopy and colonoscopy reports due to concern that patient initially presented with low hemoglobin 5.4 requiring urgent EGD at previous hospitalization which was notable for hiatal hernia, duodenal ulcer and duodenitis. Colonoscopy was also performed at that time showing 8 mm sessile polyp. (Ie procedure preceded bleeding event rather than as a complication).   - Will need to touch base with cardiology regarding plan for watchman - Rate well controlled AV paced 60s-90s  - Await GI recs regarding whether anticoagulation or need for further scoping  -Per OSH endoscopy and colonoscopy reports show patient initially presented with Hg 5.4 requiring urgent EGD at previous hospitalization which was notable for hiatal hernia, duodenal ulcer and duodenitis. Colonoscopy was also performed at that time showing 8 mm sessile polyp. (Ie procedure preceded bleeding event rather than as a complication).   - Will need to touch base with cardiology regarding plan for watchman

## 2018-03-17 NOTE — PROGRESS NOTE ADULT - PROBLEM SELECTOR PLAN 9
- HgA1c of 5.9   - Continue ISS    Glaucoma  -continue timolol, cosopt, loteprednol, brimonidine drops - f/u CT Chest  - Downtrending  - Received antibiotics for procedure

## 2018-03-17 NOTE — PROGRESS NOTE ADULT - PROBLEM SELECTOR PLAN 6
- Will need to touch base with cardiology in AM to clarify plan if PCI is indicated given 85% stenosis of proximal circumflex  - Continue high intensity statin  - Continue plavix - Will touch base with cardiology to clarify plan if PCI is indicated given 85% stenosis of proximal circumflex  - Continue high intensity statin, plavix

## 2018-03-17 NOTE — PROGRESS NOTE ADULT - PROBLEM SELECTOR PLAN 3
- If persistent or new neuro deficits consider CT head. Not associated with hypertension at time of headache.   - Tylenol PRN

## 2018-03-17 NOTE — PROGRESS NOTE ADULT - SUBJECTIVE AND OBJECTIVE BOX
Shiela Thomas, PGY1  Internal Medicine team 3  Pager [phone] / [phone]    Patient is a 82y old  Male who presents with a chief complaint of Shortness of breath (13 Mar 2018 23:00)    SUBJECTIVE / OVERNIGHT EVENTS:    REVIEW OF SYSTEMS:    CONSTITUTIONAL: No weakness, fevers or chills  EYES/ENT: No visual changes;  No dysphagia  NECK: No pain or stiffness  RESPIRATORY: No cough, wheezing, hemoptysis; No shortness of breath  CARDIOVASCULAR: No chest pain or palpitations; No lower extremity edema  GASTROINTESTINAL: No abdominal or epigastric pain. No nausea, vomiting, or hematemesis; No diarrhea or constipation. No melena or hematochezia.  GENITOURINARY: No dysuria, frequency or hematuria  NEUROLOGICAL: No numbness or weakness  SKIN: No itching, burning, rashes, or lesions   All other review of systems is negative unless indicated above.    MEDICATIONS  (STANDING):  artificial  tears Solution 1 Drop(s) Both EYES three times a day  atorvastatin 40 milliGRAM(s) Oral at bedtime  BACItracin   Ophthalmic Ointment 1 Application(s) Right EYE daily  brimonidine 0.2% Ophthalmic Solution 1 Drop(s) Right EYE two times a day  carvedilol 12.5 milliGRAM(s) Oral every 12 hours  clopidogrel Tablet 75 milliGRAM(s) Oral daily  dextrose 5%. 1000 milliLiter(s) (50 mL/Hr) IV Continuous <Continuous>  dextrose 50% Injectable 12.5 Gram(s) IV Push once  dextrose 50% Injectable 25 Gram(s) IV Push once  dextrose 50% Injectable 25 Gram(s) IV Push once  dorzolamide 2%/timolol 0.5% Ophthalmic Solution 1 Drop(s) Both EYES two times a day  furosemide   Injectable 40 milliGRAM(s) IV Push two times a day  insulin lispro (HumaLOG) corrective regimen sliding scale   SubCutaneous at bedtime  insulin lispro (HumaLOG) corrective regimen sliding scale   SubCutaneous three times a day before meals  loteprednol 0.5% Ophthalmic Suspension 1 Drop(s) Both EYES two times a day  pantoprazole    Tablet 40 milliGRAM(s) Oral before breakfast  tamsulosin 0.4 milliGRAM(s) Oral daily    MEDICATIONS  (PRN):  acetaminophen   Tablet. 650 milliGRAM(s) Oral every 6 hours PRN Mild and/or moderate and/or severe pain  ALBUTerol/ipratropium for Nebulization 3 milliLiter(s) Nebulizer every 6 hours PRN Shortness of Breath and/or Wheezing  dextrose Gel 1 Dose(s) Oral once PRN Blood Glucose LESS THAN 70 milliGRAM(s)/deciliter  glucagon  Injectable 1 milliGRAM(s) IntraMuscular once PRN Glucose LESS THAN 70 milligrams/deciliter    Vital Signs Last 24 Hrs  T(C): 36.7 (17 Mar 2018 04:24), Max: 37.2 (16 Mar 2018 19:00)  T(F): 98 (17 Mar 2018 04:24), Max: 98.9 (16 Mar 2018 19:00)  HR: 66 (17 Mar 2018 04:24) (66 - 102)  BP: 148/74 (17 Mar 2018 04:24) (128/62 - 193/81)  BP(mean): 112 (16 Mar 2018 21:00) (86 - 128)  RR: 18 (17 Mar 2018 04:24) (11 - 28)  SpO2: 98% (17 Mar 2018 04:24) (93% - 98%)    CAPILLARY BLOOD GLUCOSE      POCT Blood Glucose.: 115 mg/dL (16 Mar 2018 21:31)  POCT Blood Glucose.: 200 mg/dL (16 Mar 2018 17:01)  POCT Blood Glucose.: 132 mg/dL (16 Mar 2018 10:35)    I&O's Summary    16 Mar 2018 07:01  -  17 Mar 2018 07:00  --------------------------------------------------------  IN: 1220 mL / OUT: 3045 mL / NET: -1825 mL        PHYSICAL EXAM  GENERAL: NAD, well-developed  HEAD:  Atraumatic, Normocephalic  EYES: EOMI, PERRLA, conjunctiva and sclera clear  NECK: Supple, No JVD  CHEST/LUNG: Clear to auscultation bilaterally; No wheeze  HEART: Regular rate and rhythm; No murmurs, rubs, or gallops  ABDOMEN: Soft, Nontender, Nondistended; Bowel sounds present  EXTREMITIES:  2+ Peripheral Pulses, No clubbing, cyanosis, or edema  PSYCH: AAOx3  SKIN: No rashes or lesions      LABS:                        10.7   11.4  )-----------( 371      ( 16 Mar 2018 06:41 )             33.5     03-17    142  |  103  |  39<H>  ----------------------------<  198<H>  3.7   |  26  |  1.41<H>    Ca    8.9      17 Mar 2018 06:20  Phos  3.6     03-17  Mg     2.2     03-17    TPro  7.3  /  Alb  3.3  /  TBili  0.6  /  DBili  x   /  AST  24  /  ALT  18  /  AlkPhos  150<H>  03-17      CARDIAC MARKERS ( 16 Mar 2018 04:51 )  x     / 0.08 ng/mL / 63 U/L / x     / 2.6 ng/mL  CARDIAC MARKERS ( 15 Mar 2018 22:14 )  x     / 0.09 ng/mL / 44 U/L / x     / 2.5 ng/mL          RADIOLOGY & ADDITIONAL TESTS:     MICROBIOLOGY:     CONSULTS: Shiela Thomas, PGY1  Internal Medicine team 3  Pager [phone] / [phone]    Patient is a 82y old  Male who presents with a chief complaint of Shortness of breath (13 Mar 2018 23:00)    SUBJECTIVE / OVERNIGHT EVENTS:    REVIEW OF SYSTEMS:  Wife at bedside translating. Pt states he feels excellent. Breathing on 2L NC. Denies CP, SOB, wheezing, palpitations, abd pain. Hasn't had BM for a week except small pellet 2d ago. Denies dysuria.    MEDICATIONS  (STANDING):  artificial  tears Solution 1 Drop(s) Both EYES three times a day  atorvastatin 40 milliGRAM(s) Oral at bedtime  BACItracin   Ophthalmic Ointment 1 Application(s) Right EYE daily  brimonidine 0.2% Ophthalmic Solution 1 Drop(s) Right EYE two times a day  carvedilol 12.5 milliGRAM(s) Oral every 12 hours  clopidogrel Tablet 75 milliGRAM(s) Oral daily  dextrose 5%. 1000 milliLiter(s) (50 mL/Hr) IV Continuous <Continuous>  dextrose 50% Injectable 12.5 Gram(s) IV Push once  dextrose 50% Injectable 25 Gram(s) IV Push once  dextrose 50% Injectable 25 Gram(s) IV Push once  dorzolamide 2%/timolol 0.5% Ophthalmic Solution 1 Drop(s) Both EYES two times a day  furosemide   Injectable 40 milliGRAM(s) IV Push two times a day  insulin lispro (HumaLOG) corrective regimen sliding scale   SubCutaneous at bedtime  insulin lispro (HumaLOG) corrective regimen sliding scale   SubCutaneous three times a day before meals  loteprednol 0.5% Ophthalmic Suspension 1 Drop(s) Both EYES two times a day  pantoprazole    Tablet 40 milliGRAM(s) Oral before breakfast  tamsulosin 0.4 milliGRAM(s) Oral daily    MEDICATIONS  (PRN):  acetaminophen   Tablet. 650 milliGRAM(s) Oral every 6 hours PRN Mild and/or moderate and/or severe pain  ALBUTerol/ipratropium for Nebulization 3 milliLiter(s) Nebulizer every 6 hours PRN Shortness of Breath and/or Wheezing  dextrose Gel 1 Dose(s) Oral once PRN Blood Glucose LESS THAN 70 milliGRAM(s)/deciliter  glucagon  Injectable 1 milliGRAM(s) IntraMuscular once PRN Glucose LESS THAN 70 milligrams/deciliter    Vital Signs Last 24 Hrs  T(C): 36.7 (17 Mar 2018 04:24), Max: 37.2 (16 Mar 2018 19:00)  T(F): 98 (17 Mar 2018 04:24), Max: 98.9 (16 Mar 2018 19:00)  HR: 66 (17 Mar 2018 04:24) (66 - 102)  BP: 148/74 (17 Mar 2018 04:24) (128/62 - 193/81)  BP(mean): 112 (16 Mar 2018 21:00) (86 - 128)  RR: 18 (17 Mar 2018 04:24) (11 - 28)  SpO2: 98% (17 Mar 2018 04:24) (93% - 98%)    CAPILLARY BLOOD GLUCOSE      POCT Blood Glucose.: 115 mg/dL (16 Mar 2018 21:31)  POCT Blood Glucose.: 200 mg/dL (16 Mar 2018 17:01)  POCT Blood Glucose.: 132 mg/dL (16 Mar 2018 10:35)    I&O's Summary    16 Mar 2018 07:01  -  17 Mar 2018 07:00  --------------------------------------------------------  IN: 1220 mL / OUT: 3045 mL / NET: -1825 mL        PHYSICAL EXAM  GENERAL: NAD, well-developed  HEAD:  Atraumatic, Normocephalic  EYES: EOMI, PERRLA, conjunctiva and sclera clear  NECK: Supple, No JVD  CHEST/LUNG:  Decreased breath sounds b/l, few crackles; No wheeze  HEART: Regular rate and rhythm; No murmurs, rubs, or gallops  ABDOMEN: Soft, Nontender, Nondistended; Bowel sounds present  EXTREMITIES:  2+ Peripheral Pulses, No clubbing, cyanosis, or edema  PSYCH: AAOx3  SKIN: No rashes or lesions      LABS:                        11.7   15.53 )-----------( 382      ( 17 Mar 2018 08:29 )             36.9     CBC Full  -  ( 17 Mar 2018 08:29 )  WBC Count : 15.53 K/uL  Hemoglobin : 11.7 g/dL  Hematocrit : 36.9 %  Platelet Count - Automated : 382 K/uL  Mean Cell Volume : 83.3 fl  Mean Cell Hemoglobin : 26.4 pg  Mean Cell Hemoglobin Concentration : 31.7 gm/dL  Auto Neutrophil # : 12.02 K/uL  Auto Lymphocyte # : 1.49 K/uL  Auto Monocyte # : x  Auto Eosinophil # : x  Auto Basophil # : x  Auto Neutrophil % : 77.4 %  Auto Lymphocyte % : 9.6 %  Auto Monocyte % : 9.6 %  Auto Eosinophil % : 2.6 %  Auto Basophil % : 0.0 %    03-17    142  |  103  |  39<H>  ----------------------------<  198<H>  3.7   |  26  |  1.41<H>    Ca    8.9      17 Mar 2018 06:20  Phos  3.6     03-17  Mg     2.2     03-17    TPro  7.3  /  Alb  3.3  /  TBili  0.6  /  DBili  x   /  AST  24  /  ALT  18  /  AlkPhos  150<H>  03-17    Creatinine Trend: 1.41<--, 1.36<--, 1.43<--, 1.64<--, 1.58<--, 1.61<--  LIVER FUNCTIONS - ( 17 Mar 2018 06:20 )  Alb: 3.3 g/dL / Pro: 7.3 g/dL / ALK PHOS: 150 U/L / ALT: 18 U/L RC / AST: 24 U/L / GGT: x             CARDIAC MARKERS ( 16 Mar 2018 04:51 )  x     / 0.08 ng/mL / 63 U/L / x     / 2.6 ng/mL  CARDIAC MARKERS ( 15 Mar 2018 22:14 )  x     / 0.09 ng/mL / 44 U/L / x     / 2.5 ng/mL      ABG - ( 16 Mar 2018 04:40 )  pH: 7.47  /  pCO2: 35    /  pO2: 112   / HCO3: 25    / Base Excess: 1.8   /  SaO2: 98            MICROBIOLOGY:    RADIOLOGY & ADDITIONAL TESTS:    CONSULTS: GI, cardiology

## 2018-03-17 NOTE — PROGRESS NOTE ADULT - PROBLEM SELECTOR PLAN 2
- Continue carvedilol 12.5 BID   - Consider restarting HCTZ at 12.5 or nifedipine if requires additional blood pressure control  - Continue to hold ARB and spironolactone in setting of PAULIE  - Will need further medical reconciliation in AM regarding home medications (in outpatient medication review on spironolactone, hydrochlorothiazide, valsartan and nifedipine, however valsartan-HCTZ and a few other of his medications may have been discontinued per paperwork from previous hospitalization).

## 2018-03-17 NOTE — PROGRESS NOTE ADULT - PROBLEM SELECTOR PLAN 8
- CT Chest  - Downtrending  - Received antibiotics for procedure Persistent polymorphic VT s/p dual chamber AICD placed 3/15. Rate set to 80 bbm  -keep K>4, Mg>2

## 2018-03-18 DIAGNOSIS — J44.9 CHRONIC OBSTRUCTIVE PULMONARY DISEASE, UNSPECIFIED: ICD-10-CM

## 2018-03-18 DIAGNOSIS — J18.9 PNEUMONIA, UNSPECIFIED ORGANISM: ICD-10-CM

## 2018-03-18 DIAGNOSIS — J90 PLEURAL EFFUSION, NOT ELSEWHERE CLASSIFIED: ICD-10-CM

## 2018-03-18 DIAGNOSIS — R06.83 SNORING: ICD-10-CM

## 2018-03-18 LAB
ANION GAP SERPL CALC-SCNC: 12 MMOL/L — SIGNIFICANT CHANGE UP (ref 5–17)
BASOPHILS # BLD AUTO: 0.01 K/UL — SIGNIFICANT CHANGE UP (ref 0–0.2)
BASOPHILS NFR BLD AUTO: 0.1 % — SIGNIFICANT CHANGE UP (ref 0–2)
BUN SERPL-MCNC: 51 MG/DL — HIGH (ref 7–23)
CALCIUM SERPL-MCNC: 8.7 MG/DL — SIGNIFICANT CHANGE UP (ref 8.4–10.5)
CHLORIDE SERPL-SCNC: 102 MMOL/L — SIGNIFICANT CHANGE UP (ref 96–108)
CO2 SERPL-SCNC: 27 MMOL/L — SIGNIFICANT CHANGE UP (ref 22–31)
CREAT SERPL-MCNC: 1.6 MG/DL — HIGH (ref 0.5–1.3)
EOSINOPHIL # BLD AUTO: 0.59 K/UL — HIGH (ref 0–0.5)
EOSINOPHIL NFR BLD AUTO: 4.4 % — SIGNIFICANT CHANGE UP (ref 0–6)
FERRITIN SERPL-MCNC: 714 NG/ML — HIGH (ref 30–400)
GLUCOSE BLDC GLUCOMTR-MCNC: 173 MG/DL — HIGH (ref 70–99)
GLUCOSE BLDC GLUCOMTR-MCNC: 175 MG/DL — HIGH (ref 70–99)
GLUCOSE BLDC GLUCOMTR-MCNC: 199 MG/DL — HIGH (ref 70–99)
GLUCOSE BLDC GLUCOMTR-MCNC: 281 MG/DL — HIGH (ref 70–99)
GLUCOSE SERPL-MCNC: 207 MG/DL — HIGH (ref 70–99)
HCT VFR BLD CALC: 35.1 % — LOW (ref 39–50)
HGB BLD-MCNC: 10.7 G/DL — LOW (ref 13–17)
IMM GRANULOCYTES NFR BLD AUTO: 0.5 % — SIGNIFICANT CHANGE UP (ref 0–1.5)
LYMPHOCYTES # BLD AUTO: 1.97 K/UL — SIGNIFICANT CHANGE UP (ref 1–3.3)
LYMPHOCYTES # BLD AUTO: 14.8 % — SIGNIFICANT CHANGE UP (ref 13–44)
MAGNESIUM SERPL-MCNC: 2.3 MG/DL — SIGNIFICANT CHANGE UP (ref 1.6–2.6)
MCHC RBC-ENTMCNC: 25.3 PG — LOW (ref 27–34)
MCHC RBC-ENTMCNC: 30.5 GM/DL — LOW (ref 32–36)
MCV RBC AUTO: 83 FL — SIGNIFICANT CHANGE UP (ref 80–100)
MONOCYTES # BLD AUTO: 1.68 K/UL — HIGH (ref 0–0.9)
MONOCYTES NFR BLD AUTO: 12.6 % — SIGNIFICANT CHANGE UP (ref 2–14)
NEUTROPHILS # BLD AUTO: 8.98 K/UL — HIGH (ref 1.8–7.4)
NEUTROPHILS NFR BLD AUTO: 67.6 % — SIGNIFICANT CHANGE UP (ref 43–77)
NT-PROBNP SERPL-SCNC: 5334 PG/ML — HIGH (ref 0–300)
PHOSPHATE SERPL-MCNC: 3.5 MG/DL — SIGNIFICANT CHANGE UP (ref 2.5–4.5)
PLATELET # BLD AUTO: 371 K/UL — SIGNIFICANT CHANGE UP (ref 150–400)
POTASSIUM SERPL-MCNC: 3.7 MMOL/L — SIGNIFICANT CHANGE UP (ref 3.5–5.3)
POTASSIUM SERPL-SCNC: 3.7 MMOL/L — SIGNIFICANT CHANGE UP (ref 3.5–5.3)
RBC # BLD: 4.23 M/UL — SIGNIFICANT CHANGE UP (ref 4.2–5.8)
RBC # FLD: 17.4 % — HIGH (ref 10.3–14.5)
SODIUM SERPL-SCNC: 141 MMOL/L — SIGNIFICANT CHANGE UP (ref 135–145)
WBC # BLD: 13.3 K/UL — HIGH (ref 3.8–10.5)
WBC # FLD AUTO: 13.3 K/UL — HIGH (ref 3.8–10.5)

## 2018-03-18 PROCEDURE — 93308 TTE F-UP OR LMTD: CPT | Mod: 26

## 2018-03-18 PROCEDURE — 71045 X-RAY EXAM CHEST 1 VIEW: CPT | Mod: 26

## 2018-03-18 PROCEDURE — 99233 SBSQ HOSP IP/OBS HIGH 50: CPT

## 2018-03-18 PROCEDURE — 93321 DOPPLER ECHO F-UP/LMTD STD: CPT | Mod: 26

## 2018-03-18 PROCEDURE — 99223 1ST HOSP IP/OBS HIGH 75: CPT

## 2018-03-18 RX ORDER — HEPARIN SODIUM 5000 [USP'U]/ML
5000 INJECTION INTRAVENOUS; SUBCUTANEOUS EVERY 12 HOURS
Qty: 0 | Refills: 0 | Status: DISCONTINUED | OUTPATIENT
Start: 2018-03-18 | End: 2018-03-23

## 2018-03-18 RX ORDER — CARVEDILOL PHOSPHATE 80 MG/1
6.25 CAPSULE, EXTENDED RELEASE ORAL EVERY 12 HOURS
Qty: 0 | Refills: 0 | Status: DISCONTINUED | OUTPATIENT
Start: 2018-03-18 | End: 2018-03-20

## 2018-03-18 RX ORDER — SODIUM CHLORIDE 9 MG/ML
1000 INJECTION INTRAMUSCULAR; INTRAVENOUS; SUBCUTANEOUS
Qty: 0 | Refills: 0 | Status: DISCONTINUED | OUTPATIENT
Start: 2018-03-18 | End: 2018-03-23

## 2018-03-18 RX ADMIN — LOTEPREDNOL ETABONATE 1 DROP(S): 2 SUSPENSION/ DROPS OPHTHALMIC at 19:22

## 2018-03-18 RX ADMIN — SODIUM CHLORIDE 50 MILLILITER(S): 9 INJECTION INTRAMUSCULAR; INTRAVENOUS; SUBCUTANEOUS at 10:10

## 2018-03-18 RX ADMIN — HEPARIN SODIUM 5000 UNIT(S): 5000 INJECTION INTRAVENOUS; SUBCUTANEOUS at 17:15

## 2018-03-18 RX ADMIN — Medication 1 DROP(S): at 21:57

## 2018-03-18 RX ADMIN — CARVEDILOL PHOSPHATE 12.5 MILLIGRAM(S): 80 CAPSULE, EXTENDED RELEASE ORAL at 05:58

## 2018-03-18 RX ADMIN — BRIMONIDINE TARTRATE 1 DROP(S): 2 SOLUTION/ DROPS OPHTHALMIC at 05:57

## 2018-03-18 RX ADMIN — ATORVASTATIN CALCIUM 40 MILLIGRAM(S): 80 TABLET, FILM COATED ORAL at 21:57

## 2018-03-18 RX ADMIN — Medication 250 MILLIGRAM(S): at 00:19

## 2018-03-18 RX ADMIN — Medication 1 DROP(S): at 05:55

## 2018-03-18 RX ADMIN — Medication 40 MILLIGRAM(S): at 10:13

## 2018-03-18 RX ADMIN — Medication 500 MILLIGRAM(S): at 11:55

## 2018-03-18 RX ADMIN — Medication 1: at 16:47

## 2018-03-18 RX ADMIN — Medication 325 MILLIGRAM(S): at 11:55

## 2018-03-18 RX ADMIN — PANTOPRAZOLE SODIUM 40 MILLIGRAM(S): 20 TABLET, DELAYED RELEASE ORAL at 06:49

## 2018-03-18 RX ADMIN — Medication 1: at 08:00

## 2018-03-18 RX ADMIN — BACITRACIN 1 APPLICATION(S): 500 OINTMENT OPHTHALMIC at 11:57

## 2018-03-18 RX ADMIN — DORZOLAMIDE HYDROCHLORIDE TIMOLOL MALEATE 1 DROP(S): 20; 5 SOLUTION/ DROPS OPHTHALMIC at 05:56

## 2018-03-18 RX ADMIN — Medication 1 DROP(S): at 14:16

## 2018-03-18 RX ADMIN — SENNA PLUS 2 TABLET(S): 8.6 TABLET ORAL at 21:57

## 2018-03-18 RX ADMIN — Medication 3: at 12:16

## 2018-03-18 RX ADMIN — Medication 100 MILLIGRAM(S): at 17:14

## 2018-03-18 RX ADMIN — TAMSULOSIN HYDROCHLORIDE 0.4 MILLIGRAM(S): 0.4 CAPSULE ORAL at 21:57

## 2018-03-18 RX ADMIN — BRIMONIDINE TARTRATE 1 DROP(S): 2 SOLUTION/ DROPS OPHTHALMIC at 17:19

## 2018-03-18 RX ADMIN — LOTEPREDNOL ETABONATE 1 DROP(S): 2 SUSPENSION/ DROPS OPHTHALMIC at 06:49

## 2018-03-18 RX ADMIN — DORZOLAMIDE HYDROCHLORIDE TIMOLOL MALEATE 1 DROP(S): 20; 5 SOLUTION/ DROPS OPHTHALMIC at 17:19

## 2018-03-18 RX ADMIN — CARVEDILOL PHOSPHATE 6.25 MILLIGRAM(S): 80 CAPSULE, EXTENDED RELEASE ORAL at 17:14

## 2018-03-18 RX ADMIN — Medication 40 MILLIGRAM(S): at 21:57

## 2018-03-18 RX ADMIN — PIPERACILLIN AND TAZOBACTAM 25 GRAM(S): 4; .5 INJECTION, POWDER, LYOPHILIZED, FOR SOLUTION INTRAVENOUS at 14:10

## 2018-03-18 RX ADMIN — PIPERACILLIN AND TAZOBACTAM 25 GRAM(S): 4; .5 INJECTION, POWDER, LYOPHILIZED, FOR SOLUTION INTRAVENOUS at 05:58

## 2018-03-18 NOTE — PROGRESS NOTE ADULT - ASSESSMENT
82 year male with history of HTN DM hyperlipidemia, PAF, h/o Carotid disease, possible PVD, presents with 1 day h/o acute shortness of breath, with complete heart block, sustained atrial flutter, and polymorphic VT. On lasix 40 IV BID after presented with severe acute pulmonary edema, appears improved.. Creatinine improved at 1.4. No further VT, but occasional VPB noted. CAth noted 100% RCA and 85% prox circ lesion.     ICD check today shows small R waves when sensing bipolar, , but within normal limits at 12 when sensing integrated ( tip to coil) threshold stable 0.5@ 0.5 impedance stable.  Trace pericardial effusion    will check echo this AM to assure no pericardial effusion

## 2018-03-18 NOTE — CONSULT NOTE ADULT - PROBLEM SELECTOR RECOMMENDATION 4
He has a 1 cm loculated nodule in the ZORAIDA. Given his significant smoking hx, this will need to be followed.   - Patient will need a rpt CT scan in 3 months. He has a 1 cm loculated nodule in the ZORAIDA. Given his significant smoking hx, this will need to be followed.   - Patient will need a rpt CT scan in 3 months at a minimum

## 2018-03-18 NOTE — PROGRESS NOTE ADULT - PROBLEM SELECTOR PLAN 4
- Rate well controlled AV paced 60s-90s  - Await GI recs regarding whether anticoagulation or need for further scoping  -Per OSH endoscopy and colonoscopy reports show patient initially presented with Hg 5.4 requiring urgent EGD at previous hospitalization which was notable for hiatal hernia, duodenal ulcer and duodenitis. Colonoscopy was also performed at that time showing 8 mm sessile polyp. (Ie procedure preceded bleeding event rather than as a complication).   - No plan for Watchman procedure for now per EP until patient stabilized

## 2018-03-18 NOTE — CONSULT NOTE ADULT - ASSESSMENT
Mr. Villalpando is a 82 year old man with R sided endarterectomy [7/2017], GI bleed s/p colonic tumor removal in 12/2017, annabelle / theron, presented to OSH with dyspnea -- found to be bradycardiac with a. flutter - variable block. s/p PPM. His respiratory status as signficantly improved with diuresis. Currently he on room air. Found to have a loculated simple pleural effusion on CT and bedside US.     - will discuss with attg, Dr. Aviles later today. Mr. Villalpando is a 82 year old man with R sided endarterectomy [7/2017], GI bleed s/p colonic tumor removal in 12/2017, annabelle / theron, presented to OSH with dyspnea -- found to be bradycardiac with a. flutter - variable block. s/p PPM. His respiratory status as signficantly improved with diuresis. Currently he on room air.  ***** Found to have a loculated simple pleural effusion on CT and bedside US.

## 2018-03-18 NOTE — PROGRESS NOTE ADULT - PROBLEM SELECTOR PLAN 8
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 Rate set to 80 bbm  -keep K>4, Mg>2

## 2018-03-18 NOTE — CONSULT NOTE ADULT - SUBJECTIVE AND OBJECTIVE BOX
HPI:    PAST MEDICAL & SURGICAL HISTORY:  Atrial flutter, unspecified type  Type 2 diabetes mellitus without complication, without long-term current use of insulin  Essential hypertension  Polyp of colon, unspecified part of colon, unspecified type  History of carotid endarterectomy      FAMILY HISTORY:  No pertinent family history in first degree relatives      SOCIAL HISTORY:  Smoking: __ packs x ___ years  EtOH Use:  Marital Status:  Occupation:  Exposures:  Recent Travel:    Allergies    No Known Allergies    Intolerances        HOME MEDICATIONS:    REVIEW OF SYSTEMS:  Constitutional: No fevers or chills. No weight loss. No fatigue or generalised malaise.  Eyes: No itching or discharge from the eyes  ENT: No ear pain. No ear discharge. No nasal congestion. No post nasal drip. No epistaxis. No throat pain. No sore throat. No difficulty swallowing.   CV: No chest pain. No palpitations. No lightheadedness or dizziness.   Resp: No dyspnea at rest. No dyspnea on exertion. No orthopnea. No wheezing. No cough. No stridor. No sputum production. No chest pain with respiration.  GI: No nausea. No vomiting. No diarrhea.  MSK: No joint pain or pain in any extremities  Integumentary: No skin lesions. No pedal edema.  Neurological: No gross motor weakness. No sensory changes.    [ ] All other systems negative  [ ] Unable to assess ROS because ________    OBJECTIVE:  ICU Vital Signs Last 24 Hrs  T(C): 36.5 (18 Mar 2018 09:00), Max: 36.9 (17 Mar 2018 14:10)  T(F): 97.7 (18 Mar 2018 09:00), Max: 98.5 (17 Mar 2018 14:10)  HR: 60 (18 Mar 2018 09:00) (60 - 80)  BP: 109/67 (18 Mar 2018 09:00) (108/60 - 149/69)  BP(mean): --  ABP: --  ABP(mean): --  RR: 18 (18 Mar 2018 09:00) (18 - 18)  SpO2: 95% (18 Mar 2018 04:44) (95% - 96%)        03-17 @ 07:01  -  03-18 @ 07:00  --------------------------------------------------------  IN: 480 mL / OUT: 450 mL / NET: 30 mL    03-18 @ 07:01  - 03-18 @ 13:27  --------------------------------------------------------  IN: 245 mL / OUT: 450 mL / NET: -205 mL      CAPILLARY BLOOD GLUCOSE      POCT Blood Glucose.: 281 mg/dL (18 Mar 2018 11:42)      PHYSICAL EXAM:  General: Awake, alert, oriented X 3.   HEENT: Atraumatic, normocephalic.                 Mallampatti Grade                 No nasal congestion.                No tonsillar or pharyngeal exudates.  Lymph Nodes: No palpable lymphadenopathy  Neck: No JVD. No carotid bruit.   Respiratory: Normal chest expansion                         Normal percussion                         Normal and equal air entry                         No wheeze, rhonchi or rales.  Cardiovascular: S1 S2 normal. No murmurs, rubs or gallops.   Abdomen: Soft, non-tender, non-distended. No organomegaly.  Extremities: Warm to touch. Peripheral pulse palpable. No pedal edema.   Skin: No rashes or skin lesions  Neurological: Motor and sensory examination equal and normal in all four extremities.  Psychiatry: Appropriate mood and affect.    HOSPITAL MEDICATIONS:  MEDICATIONS  (STANDING):  artificial  tears Solution 1 Drop(s) Both EYES three times a day  ascorbic acid 500 milliGRAM(s) Oral daily  atorvastatin 40 milliGRAM(s) Oral at bedtime  BACItracin   Ophthalmic Ointment 1 Application(s) Right EYE daily  brimonidine 0.2% Ophthalmic Solution 1 Drop(s) Right EYE two times a day  carvedilol 12.5 milliGRAM(s) Oral every 12 hours  dextrose 5%. 1000 milliLiter(s) (50 mL/Hr) IV Continuous <Continuous>  dextrose 50% Injectable 12.5 Gram(s) IV Push once  dextrose 50% Injectable 25 Gram(s) IV Push once  dextrose 50% Injectable 25 Gram(s) IV Push once  docusate sodium 100 milliGRAM(s) Oral two times a day  dorzolamide 2%/timolol 0.5% Ophthalmic Solution 1 Drop(s) Both EYES two times a day  ferrous    sulfate 325 milliGRAM(s) Oral daily  furosemide   Injectable 40 milliGRAM(s) IV Push two times a day  heparin  Injectable 5000 Unit(s) SubCutaneous every 12 hours  insulin lispro (HumaLOG) corrective regimen sliding scale   SubCutaneous at bedtime  insulin lispro (HumaLOG) corrective regimen sliding scale   SubCutaneous three times a day before meals  loteprednol 0.5% Ophthalmic Suspension 1 Drop(s) Both EYES two times a day  pantoprazole    Tablet 40 milliGRAM(s) Oral before breakfast  piperacillin/tazobactam IVPB. 3.375 Gram(s) IV Intermittent every 8 hours  polyethylene glycol 3350 17 Gram(s) Oral daily  senna 2 Tablet(s) Oral at bedtime  sodium chloride 0.9%. 1000 milliLiter(s) (50 mL/Hr) IV Continuous <Continuous>  tamsulosin 0.4 milliGRAM(s) Oral daily  vancomycin  IVPB 1000 milliGRAM(s) IV Intermittent every 24 hours    MEDICATIONS  (PRN):  acetaminophen   Tablet. 650 milliGRAM(s) Oral every 6 hours PRN Mild and/or moderate and/or severe pain  ALBUTerol/ipratropium for Nebulization 3 milliLiter(s) Nebulizer every 6 hours PRN Shortness of Breath and/or Wheezing  dextrose Gel 1 Dose(s) Oral once PRN Blood Glucose LESS THAN 70 milliGRAM(s)/deciliter  glucagon  Injectable 1 milliGRAM(s) IntraMuscular once PRN Glucose LESS THAN 70 milligrams/deciliter      LABS:                        10.7   13.30 )-----------( 371      ( 18 Mar 2018 08:38 )             35.1     03-18    141  |  102  |  51<H>  ----------------------------<  207<H>  3.7   |  27  |  1.60<H>    Ca    8.7      18 Mar 2018 05:58  Phos  3.5     03-18  Mg     2.3     03-18    TPro  7.3  /  Alb  3.3  /  TBili  0.6  /  DBili  x   /  AST  24  /  ALT  18  /  AlkPhos  150<H>  03-17          Venous Blood Gas:  03-17 @ 05:59  7.39/46/35/28/54  VBG Lactate: 1.9      MICROBIOLOGY:     RADIOLOGY:  [ ] Reviewed and interpreted by me    Point of Care Ultrasound Findings;    PFT:    EKG: HPI: Mr. Villalpando is a 82 year old man with R sided endarterectomy [7/2017], GI bleed s/p colonic tumor removal in 12/2017, a.fib / flutter, presented to OSH with dyspnea.     Prior to the CEA, the pt was in good health. Since then, he had a lot of complications involving bleeding and multiple hospitalizations and rehab stays. He has been living at home with his wife -- walking around the house.     Reason for this hospitalization -- he woke up the day of admission with significant dyspnea. He was brought to [facility], found to have severe hypoxemic resp failure. He received nebs, steroids over there. Did not have any fevers. No chest pain. No N/V. He does have chronic LE edema. Wife states that a lot of urine came out after fernández. Patient eventually got transferred to [facility] for possible PPM for a.flutter w/ variable block/junctional rhythm.     Here is on BiPAP and getting diuresis. In the CCU -- he got an ICD placed. Able to be extubated. Transferred out to the floors. He is getting evaluated for possible watchmen's procedure -- which will need AC post procedure. Was on Plavix monotherapy, but was held for possible pericardial effusion -- not found on ECHO.       Now consulted for R pleural effusion.     4/2017- stress test neg for ischemia, at that time in NSR w/ RBBB  3/2017- echo showed normal LV function w/ moderate concentric hypertrophy       PAST MEDICAL & SURGICAL HISTORY:  Atrial flutter, unspecified type  Type 2 diabetes mellitus without complication, without long-term current use of insulin  Essential hypertension  Polyp of colon, unspecified part of colon, unspecified type  History of carotid endarterectomy      FAMILY HISTORY:  No pertinent family history in first degree relatives      SOCIAL HISTORY:  Smoking: __ packs x ___ years  EtOH Use:  Marital Status:  Occupation:  Exposures:  Recent Travel:    Allergies    No Known Allergies    Intolerances        HOME MEDICATIONS: reviewed    REVIEW OF SYSTEMS:  Constitutional: No fevers or chills. No weight loss. No fatigue or generalised malaise.  Eyes: No itching or discharge from the eyes  ENT: No ear pain. No ear discharge. No nasal congestion. No post nasal drip. No epistaxis. No throat pain. No sore throat. No difficulty swallowing.   CV: No chest pain. No palpitations. No lightheadedness or dizziness.   Resp: No dyspnea at rest. No dyspnea on exertion. No orthopnea. No wheezing. No cough. No stridor. No sputum production. No chest pain with respiration.  GI: No nausea. No vomiting. No diarrhea.  MSK: No joint pain or pain in any extremities  Integumentary: No skin lesions. No pedal edema.  Neurological: No gross motor weakness. No sensory changes.    [x] All other systems negative  [ ] Unable to assess ROS because ________    OBJECTIVE:  ICU Vital Signs Last 24 Hrs  T(C): 36.5 (18 Mar 2018 09:00), Max: 36.9 (17 Mar 2018 14:10)  T(F): 97.7 (18 Mar 2018 09:00), Max: 98.5 (17 Mar 2018 14:10)  HR: 60 (18 Mar 2018 09:00) (60 - 80)  BP: 109/67 (18 Mar 2018 09:00) (108/60 - 149/69)  RR: 18 (18 Mar 2018 09:00) (18 - 18)  SpO2: 95% (18 Mar 2018 04:44) (95% - 96%)        03-17 @ 07:01  -  03-18 @ 07:00  --------------------------------------------------------  IN: 480 mL / OUT: 450 mL / NET: 30 mL    03-18 @ 07:01 - 03-18 @ 13:27  --------------------------------------------------------  IN: 245 mL / OUT: 450 mL / NET: -205 mL      CAPILLARY BLOOD GLUCOSE      POCT Blood Glucose.: 281 mg/dL (18 Mar 2018 11:42)      PHYSICAL EXAM:  General: Awake, alert, oriented X 3.   HEENT: Atraumatic, normocephalic.                 Mallampatti Grade                 No nasal congestion.                No tonsillar or pharyngeal exudates.  Lymph Nodes: No palpable lymphadenopathy  Neck: No JVD. No carotid bruit.   Respiratory: Normal chest expansion                         Normal percussion                         Normal and equal air entry                         No wheeze, rhonchi or rales.  Cardiovascular: S1 S2 normal. No murmurs, rubs or gallops.   Abdomen: Soft, non-tender, non-distended. No organomegaly.  Extremities: Warm to touch. Peripheral pulse palpable. No pedal edema.   Skin: No rashes or skin lesions  Neurological: Motor and sensory examination equal and normal in all four extremities.  Psychiatry: Appropriate mood and affect.    HOSPITAL MEDICATIONS:  MEDICATIONS  (STANDING):  artificial  tears Solution 1 Drop(s) Both EYES three times a day  ascorbic acid 500 milliGRAM(s) Oral daily  atorvastatin 40 milliGRAM(s) Oral at bedtime  BACItracin   Ophthalmic Ointment 1 Application(s) Right EYE daily  brimonidine 0.2% Ophthalmic Solution 1 Drop(s) Right EYE two times a day  carvedilol 12.5 milliGRAM(s) Oral every 12 hours  dextrose 5%. 1000 milliLiter(s) (50 mL/Hr) IV Continuous <Continuous>  dextrose 50% Injectable 12.5 Gram(s) IV Push once  dextrose 50% Injectable 25 Gram(s) IV Push once  dextrose 50% Injectable 25 Gram(s) IV Push once  docusate sodium 100 milliGRAM(s) Oral two times a day  dorzolamide 2%/timolol 0.5% Ophthalmic Solution 1 Drop(s) Both EYES two times a day  ferrous    sulfate 325 milliGRAM(s) Oral daily  furosemide   Injectable 40 milliGRAM(s) IV Push two times a day  heparin  Injectable 5000 Unit(s) SubCutaneous every 12 hours  insulin lispro (HumaLOG) corrective regimen sliding scale   SubCutaneous at bedtime  insulin lispro (HumaLOG) corrective regimen sliding scale   SubCutaneous three times a day before meals  loteprednol 0.5% Ophthalmic Suspension 1 Drop(s) Both EYES two times a day  pantoprazole    Tablet 40 milliGRAM(s) Oral before breakfast  piperacillin/tazobactam IVPB. 3.375 Gram(s) IV Intermittent every 8 hours  polyethylene glycol 3350 17 Gram(s) Oral daily  senna 2 Tablet(s) Oral at bedtime  sodium chloride 0.9%. 1000 milliLiter(s) (50 mL/Hr) IV Continuous <Continuous>  tamsulosin 0.4 milliGRAM(s) Oral daily  vancomycin  IVPB 1000 milliGRAM(s) IV Intermittent every 24 hours    MEDICATIONS  (PRN):  acetaminophen   Tablet. 650 milliGRAM(s) Oral every 6 hours PRN Mild and/or moderate and/or severe pain  ALBUTerol/ipratropium for Nebulization 3 milliLiter(s) Nebulizer every 6 hours PRN Shortness of Breath and/or Wheezing  dextrose Gel 1 Dose(s) Oral once PRN Blood Glucose LESS THAN 70 milliGRAM(s)/deciliter  glucagon  Injectable 1 milliGRAM(s) IntraMuscular once PRN Glucose LESS THAN 70 milligrams/deciliter      LABS:                        10.7   13.30 )-----------( 371      ( 18 Mar 2018 08:38 )             35.1     03-18    141  |  102  |  51<H>  ----------------------------<  207<H>  3.7   |  27  |  1.60<H>    Ca    8.7      18 Mar 2018 05:58  Phos  3.5     03-18  Mg     2.3     03-18    TPro  7.3  /  Alb  3.3  /  TBili  0.6  /  DBili  x   /  AST  24  /  ALT  18  /  AlkPhos  150<H>  03-17          Venous Blood Gas:  03-17 @ 05:59  7.39/46/35/28/54  VBG Lactate: 1.9      MICROBIOLOGY:     RADIOLOGY:  [ ] Reviewed and interpreted by me    Point of Care Ultrasound Findings;    PFT:    EKG: HPI: Mr. Villalpando is a 82 year old man with R sided endarterectomy [2017], GI bleed s/p colonic tumor removal in 2017, a.fib / flutter, presented to OSH with dyspnea.     Prior to the CEA, the pt was in good health. Since then, he had a lot of complications involving bleeding and multiple hospitalizations and rehab stays. He has been living at home with his wife -- walking around the house.     Reason for this hospitalization -- he woke up the day of admission with significant dyspnea. He was brought to [facility], found to have severe hypoxemic resp failure. At [place], he was found to be bradycardiac -- evaluated by EP -- and needed to be transferred here for PPM placement.     Did not have any fevers. No chest pain. No N/V. No significant cough.     Here is was BiPAP and received aggressive diuresis. In the CCU -- he got an ICD placed. Able to be extubated. Transferred out to the floors. He is getting evaluated for possible watchmen's procedure -- which will need AC post procedure. Was on Plavix monotherapy, but was held for possible pericardial effusion -- not found on ECHO.     Now consulted for R pleural effusion. No prior CXRs than this admission. He initially came in with significant pulm edema and R effusion. Currently, he is asymptomatic. Not in any respiratory distress. No real fevers or cough. He does have a significant smoking history. On RA.     2017- stress test neg for ischemia, at that time in NSR w/ RBBB  3/2017- echo showed normal LV function w/ moderate concentric hypertrophy       PAST MEDICAL & SURGICAL HISTORY:  Atrial flutter, unspecified type  Type 2 diabetes mellitus without complication, without long-term current use of insulin  Essential hypertension  Polyp of colon, unspecified part of colon, unspecified type  History of carotid endarterectomy      FAMILY HISTORY:  No pertinent family history in first degree relatives      SOCIAL HISTORY:  Smokin years x 1 pack; stopped 12 years ago.   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.     Allergies  No Known Allergies    HOME MEDICATIONS: reviewed    REVIEW OF SYSTEMS:  Constitutional: No fevers or chills. No weight loss. No fatigue or generalised malaise.  Eyes: No itching or discharge from the eyes  ENT: No ear pain. No ear discharge. No nasal congestion. No post nasal drip. No epistaxis. No throat pain. No sore throat. No difficulty swallowing.   CV: see HPI  Resp: see HPI  GI: No nausea. No vomiting. No diarrhea.  +significant h/o GI bleeding.  MSK: No joint pain or pain in any extremities  Integumentary: No skin lesions. No pedal edema.  Neurological: No gross motor weakness. No sensory changes.    [x] All other systems negative  [ ] Unable to assess ROS because ________    OBJECTIVE:  ICU Vital Signs Last 24 Hrs  T(C): 36.5 (18 Mar 2018 09:00), Max: 36.9 (17 Mar 2018 14:10)  T(F): 97.7 (18 Mar 2018 09:00), Max: 98.5 (17 Mar 2018 14:10)  HR: 60 (18 Mar 2018 09:00) (60 - 80)  BP: 109/67 (18 Mar 2018 09:00) (108/60 - 149/69)  RR: 18 (18 Mar 2018 09:00) (18 - 18)  SpO2: 95% (18 Mar 2018 04:44) (95% - 96%)         @ 07:01  -  18 @ 07:00  --------------------------------------------------------  IN: 480 mL / OUT: 450 mL / NET: 30 mL     @ 07:01  -   @ 13:27  --------------------------------------------------------  IN: 245 mL / OUT: 450 mL / NET: -205 mL      CAPILLARY BLOOD GLUCOSE      POCT Blood Glucose.: 281 mg/dL (18 Mar 2018 11:42)      PHYSICAL EXAM:  General: Awake, alert, oriented X 3. resting in bed. comfortable.   HEENT: Atraumatic, normocephalic. Mallampatti Grade 3  Lymph Nodes: No palpable lymphadenopathy  Neck: No JVD. No carotid bruit.   Respiratory: Normal chest expansion. Decreased BS on the R base; otherwise clear. No wheezing.   Cardiovascular: S1 S2 normal. No murmurs, rubs or gallops.   Abdomen: Soft, non-tender, non-distended. No organomegaly.  Extremities: Warm to touch. Peripheral pulse palpable. No pedal edema.   Skin: No rashes or skin lesions  Neurological: Motor and sensory examination equal and normal in all four extremities.  Psychiatry: Appropriate mood and affect.    HOSPITAL MEDICATIONS:  MEDICATIONS  (STANDING):  artificial  tears Solution 1 Drop(s) Both EYES three times a day  ascorbic acid 500 milliGRAM(s) Oral daily  atorvastatin 40 milliGRAM(s) Oral at bedtime  BACItracin   Ophthalmic Ointment 1 Application(s) Right EYE daily  brimonidine 0.2% Ophthalmic Solution 1 Drop(s) Right EYE two times a day  carvedilol 12.5 milliGRAM(s) Oral every 12 hours  dextrose 5%. 1000 milliLiter(s) (50 mL/Hr) IV Continuous <Continuous>  dextrose 50% Injectable 12.5 Gram(s) IV Push once  dextrose 50% Injectable 25 Gram(s) IV Push once  dextrose 50% Injectable 25 Gram(s) IV Push once  docusate sodium 100 milliGRAM(s) Oral two times a day  dorzolamide 2%/timolol 0.5% Ophthalmic Solution 1 Drop(s) Both EYES two times a day  ferrous    sulfate 325 milliGRAM(s) Oral daily  furosemide   Injectable 40 milliGRAM(s) IV Push two times a day  heparin  Injectable 5000 Unit(s) SubCutaneous every 12 hours  insulin lispro (HumaLOG) corrective regimen sliding scale   SubCutaneous at bedtime  insulin lispro (HumaLOG) corrective regimen sliding scale   SubCutaneous three times a day before meals  loteprednol 0.5% Ophthalmic Suspension 1 Drop(s) Both EYES two times a day  pantoprazole    Tablet 40 milliGRAM(s) Oral before breakfast  piperacillin/tazobactam IVPB. 3.375 Gram(s) IV Intermittent every 8 hours  polyethylene glycol 3350 17 Gram(s) Oral daily  senna 2 Tablet(s) Oral at bedtime  sodium chloride 0.9%. 1000 milliLiter(s) (50 mL/Hr) IV Continuous <Continuous>  tamsulosin 0.4 milliGRAM(s) Oral daily  vancomycin  IVPB 1000 milliGRAM(s) IV Intermittent every 24 hours    MEDICATIONS  (PRN):  acetaminophen   Tablet. 650 milliGRAM(s) Oral every 6 hours PRN Mild and/or moderate and/or severe pain  ALBUTerol/ipratropium for Nebulization 3 milliLiter(s) Nebulizer every 6 hours PRN Shortness of Breath and/or Wheezing  dextrose Gel 1 Dose(s) Oral once PRN Blood Glucose LESS THAN 70 milliGRAM(s)/deciliter  glucagon  Injectable 1 milliGRAM(s) IntraMuscular once PRN Glucose LESS THAN 70 milligrams/deciliter      LABS:                        10.7   13.30 )-----------( 371      ( 18 Mar 2018 08:38 )             35.1     03-18    141  |  102  |  51<H>  ----------------------------<  207<H>  3.7   |  27  |  1.60<H>    Ca    8.7      18 Mar 2018 05:58  Phos  3.5       Mg     2.3         TPro  7.3  /  Alb  3.3  /  TBili  0.6  /  DBili  x   /  AST  24  /  ALT  18  /  AlkPhos  150<H>  -17    Venous Blood Gas:   @ 05:59  7.39/46/35/28/54  VBG Lactate: 1.9      MICROBIOLOGY: none    RADIOLOGY:  CT SCAN:     Impression: Small to moderate size loculated right pleural effusion.    1 cm lobulated nodule is noted in the apical segment of the left upper   lobe. Exact etiology is unclear. Follow-up CT scan is recommended in 3   months to ensure complete resolution.      ECHO:   Conclusions:  Suboptimal image quality; limited study.  1. Mitral annular calcification.  Thickened mitral valve  leaflets. Mild mitral regurgitation.  2. Aortic valve not well visualized; appears calcified with  normal opening. Peak transaortic valve gradient equals 10  mm Hg. Minimal aortic regurgitation.  3. Left atrium not well visualized; appears dilated.  4. Normal left ventricular internal dimensions.  5. Endocardial visualization enhanced with intravenous  injection of echo contrast (Definity).  Endocardium not  well visualized despite the use of echo contrast; overall  left ventricular systolic function appears preserved.  Paradoxical septal motion.  6. The right ventricle is not well visualized.  7. Estimated right ventricular systolic pressure equals 48  mm Hg, assuming right atrial pressure equals 8 mm Hg,  consistent with mild pulmonary hypertension.  *** No previous Echo exam.    Point of Care Ultrasound Findings:  small to mod loculated simple pleural effusion  underlying consolidative pattern.   small pocket posteriorly  tracts to anterior chest -- small amt anteriorly.

## 2018-03-18 NOTE — PROGRESS NOTE ADULT - PROBLEM SELECTOR PLAN 1
Will check echo to assure no significant pericardial effusion. Hold plavix. Hold antihypertensives and diuretics.

## 2018-03-18 NOTE — PROGRESS NOTE ADULT - PROBLEM SELECTOR PLAN 6
- Will touch base with cardiology to clarify plan if PCI is indicated given 85% stenosis of proximal circumflex  - Continue high intensity statin, Plavix

## 2018-03-18 NOTE — PROGRESS NOTE ADULT - SUBJECTIVE AND OBJECTIVE BOX
Patient is a 82y old  Male who presents with a chief complaint of Shortness of breath (13 Mar 2018 23:00)      SUBJECTIVE / OVERNIGHT EVENTS: No complaints overnight. No SOB or CP, no palpitations. NP cough.    Gen: negative for fevers, chills, malaise, fatigue  Eyes: no blurred vision  ENT: no vertigo  Resp: no wheezing, dyspnea, or pleuritic chest pain  CV: no chest pain, dyspnea on exertion, or palpitations  GI: no nausea, vomiting, abdominal pain, diarrhea, constipation  : no dysuria, hematuria  MSK: no arthralgias or myalgias  Neuro: no focal deficits, dizziness  Skin: no rash, lesions, or edema    MEDICATIONS  (STANDING):  artificial  tears Solution 1 Drop(s) Both EYES three times a day  ascorbic acid 500 milliGRAM(s) Oral daily  atorvastatin 40 milliGRAM(s) Oral at bedtime  BACItracin   Ophthalmic Ointment 1 Application(s) Right EYE daily  brimonidine 0.2% Ophthalmic Solution 1 Drop(s) Right EYE two times a day  carvedilol 12.5 milliGRAM(s) Oral every 12 hours  dextrose 5%. 1000 milliLiter(s) (50 mL/Hr) IV Continuous <Continuous>  dextrose 50% Injectable 12.5 Gram(s) IV Push once  dextrose 50% Injectable 25 Gram(s) IV Push once  dextrose 50% Injectable 25 Gram(s) IV Push once  docusate sodium 100 milliGRAM(s) Oral two times a day  dorzolamide 2%/timolol 0.5% Ophthalmic Solution 1 Drop(s) Both EYES two times a day  ferrous    sulfate 325 milliGRAM(s) Oral daily  furosemide   Injectable 40 milliGRAM(s) IV Push two times a day  heparin  Injectable 5000 Unit(s) SubCutaneous every 12 hours  insulin lispro (HumaLOG) corrective regimen sliding scale   SubCutaneous at bedtime  insulin lispro (HumaLOG) corrective regimen sliding scale   SubCutaneous three times a day before meals  loteprednol 0.5% Ophthalmic Suspension 1 Drop(s) Both EYES two times a day  pantoprazole    Tablet 40 milliGRAM(s) Oral before breakfast  piperacillin/tazobactam IVPB. 3.375 Gram(s) IV Intermittent every 8 hours  polyethylene glycol 3350 17 Gram(s) Oral daily  senna 2 Tablet(s) Oral at bedtime  sodium chloride 0.9%. 1000 milliLiter(s) (50 mL/Hr) IV Continuous <Continuous>  tamsulosin 0.4 milliGRAM(s) Oral daily  vancomycin  IVPB 1000 milliGRAM(s) IV Intermittent every 24 hours    MEDICATIONS  (PRN):  acetaminophen   Tablet. 650 milliGRAM(s) Oral every 6 hours PRN Mild and/or moderate and/or severe pain  ALBUTerol/ipratropium for Nebulization 3 milliLiter(s) Nebulizer every 6 hours PRN Shortness of Breath and/or Wheezing  dextrose Gel 1 Dose(s) Oral once PRN Blood Glucose LESS THAN 70 milliGRAM(s)/deciliter  glucagon  Injectable 1 milliGRAM(s) IntraMuscular once PRN Glucose LESS THAN 70 milligrams/deciliter    Vital Signs Last 24 Hrs  T(C): 36.5 (18 Mar 2018 09:00), Max: 36.6 (18 Mar 2018 04:44)  T(F): 97.7 (18 Mar 2018 09:00), Max: 97.8 (18 Mar 2018 04:44)  HR: 60 (18 Mar 2018 09:00) (60 - 80)  BP: 109/67 (18 Mar 2018 09:00) (109/67 - 149/69)  BP(mean): --  RR: 18 (18 Mar 2018 09:00) (18 - 18)  SpO2: 95% (18 Mar 2018 04:44) (95% - 96%)    CAPILLARY BLOOD GLUCOSE      POCT Blood Glucose.: 281 mg/dL (18 Mar 2018 11:42)  POCT Blood Glucose.: 175 mg/dL (18 Mar 2018 07:25)  POCT Blood Glucose.: 169 mg/dL (17 Mar 2018 21:26)  POCT Blood Glucose.: 175 mg/dL (17 Mar 2018 16:25)    I&O's Summary    17 Mar 2018 07:01  -  18 Mar 2018 07:00  --------------------------------------------------------  IN: 480 mL / OUT: 450 mL / NET: 30 mL    18 Mar 2018 07:01  -  18 Mar 2018 14:21  --------------------------------------------------------  IN: 245 mL / OUT: 450 mL / NET: -205 mL        PHYSICAL EXAM:  GENERAL: NAD, well-developed  HEAD:  Atraumatic  EYES: EOMI, PERRL, conjunctiva and sclera clear  NECK: Supple, No JVD  CHEST/LUNG: faint LLL rales. No wheezes or rhonchi  HEART: Regular rate and rhythm; No murmurs, rubs, or gallops  ABDOMEN: Soft, Nontender, Nondistended; Bowel sounds present  EXTREMITIES:  2+ Peripheral Pulses, No clubbing, cyanosis, or edema  NEUROLOGY: A&O x3  SKIN: No rashes or lesions    LABS:                        10.7   13.30 )-----------( 371      ( 18 Mar 2018 08:38 )             35.1     03-18    141  |  102  |  51<H>  ----------------------------<  207<H>  3.7   |  27  |  1.60<H>    Ca    8.7      18 Mar 2018 05:58  Phos  3.5     03-18  Mg     2.3     03-18    TPro  7.3  /  Alb  3.3  /  TBili  0.6  /  DBili  x   /  AST  24  /  ALT  18  /  AlkPhos  150<H>  03-17              RADIOLOGY & ADDITIONAL TESTS:    Imaging Personally Reviewed: CT chest    Consultant(s) Notes Reviewed:  EP    Care Discussed with Consultants/Other Providers: EP

## 2018-03-18 NOTE — PROGRESS NOTE ADULT - SUBJECTIVE AND OBJECTIVE BOX
INTERVAL HPI/OVERNIGHT EVENTS:  Patient still  occasionally confused, but breathing comfortably.    MEDICATIONS  (STANDING):  artificial  tears Solution 1 Drop(s) Both EYES three times a day  ascorbic acid 500 milliGRAM(s) Oral daily  atorvastatin 40 milliGRAM(s) Oral at bedtime  BACItracin   Ophthalmic Ointment 1 Application(s) Right EYE daily  brimonidine 0.2% Ophthalmic Solution 1 Drop(s) Right EYE two times a day  carvedilol 12.5 milliGRAM(s) Oral every 12 hours  dextrose 5%. 1000 milliLiter(s) (50 mL/Hr) IV Continuous <Continuous>  dextrose 50% Injectable 12.5 Gram(s) IV Push once  dextrose 50% Injectable 25 Gram(s) IV Push once  dextrose 50% Injectable 25 Gram(s) IV Push once  docusate sodium 100 milliGRAM(s) Oral two times a day  dorzolamide 2%/timolol 0.5% Ophthalmic Solution 1 Drop(s) Both EYES two times a day  ferrous    sulfate 325 milliGRAM(s) Oral daily  furosemide   Injectable 40 milliGRAM(s) IV Push two times a day  insulin lispro (HumaLOG) corrective regimen sliding scale   SubCutaneous at bedtime  insulin lispro (HumaLOG) corrective regimen sliding scale   SubCutaneous three times a day before meals  loteprednol 0.5% Ophthalmic Suspension 1 Drop(s) Both EYES two times a day  pantoprazole    Tablet 40 milliGRAM(s) Oral before breakfast  piperacillin/tazobactam IVPB. 3.375 Gram(s) IV Intermittent every 8 hours  polyethylene glycol 3350 17 Gram(s) Oral daily  senna 2 Tablet(s) Oral at bedtime  tamsulosin 0.4 milliGRAM(s) Oral daily  vancomycin  IVPB 1000 milliGRAM(s) IV Intermittent every 24 hours    MEDICATIONS  (PRN):  acetaminophen   Tablet. 650 milliGRAM(s) Oral every 6 hours PRN Mild and/or moderate and/or severe pain  ALBUTerol/ipratropium for Nebulization 3 milliLiter(s) Nebulizer every 6 hours PRN Shortness of Breath and/or Wheezing  dextrose Gel 1 Dose(s) Oral once PRN Blood Glucose LESS THAN 70 milliGRAM(s)/deciliter  glucagon  Injectable 1 milliGRAM(s) IntraMuscular once PRN Glucose LESS THAN 70 milligrams/deciliter      Allergies    No Known Allergies    Intolerances      ROS:  General: Pt denies recent weight loss/fever/chills    Neurological: denies numbness or  sensation loss    Cardiovascular: denies chest pain/palpitations/leg edema    Respiratory and Thorax: denies SOB/cough/wheezing    Gastrointestinal: denies abdominal pain/diarrhea/constipation/bloody stool    Genitourinary: denies urinary frequency/urgency/ dysuria    Musculoskeletal: denies joint pain or swelling, denies restricted motion    Hematologic: denies abnormal bleeding  	    	  	    		        	    	            Vital Signs Last 24 Hrs  T(C): 36.6 (18 Mar 2018 04:44), Max: 36.9 (17 Mar 2018 14:10)  T(F): 97.8 (18 Mar 2018 04:44), Max: 98.5 (17 Mar 2018 14:10)  HR: 60 (18 Mar 2018 04:44) (60 - 80)  BP: 122/67 (18 Mar 2018 04:44) (108/60 - 149/69)  BP(mean): --  RR: 18 (18 Mar 2018 04:44) (18 - 18)  SpO2: 95% (18 Mar 2018 04:44) (95% - 99%)  Daily     Daily     03-17 @ 07:01  -  03-18 @ 07:00  --------------------------------------------------------  IN: 480 mL / OUT: 450 mL / NET: 30 mL      Physical Exam:    WDWN male  jvd difficult to assess  cor RRR   lung clear  abdomen soft  ext no edema  neuro AX0 x2       LABS:                        11.7   15.53 )-----------( 382      ( 17 Mar 2018 08:29 )             36.9     03-18    141  |  102  |  51<H>  ----------------------------<  207<H>  3.7   |  27  |  1.60<H>    Ca    8.7      18 Mar 2018 05:58  Phos  3.5     03-18  Mg     2.3     03-18    TPro  7.3  /  Alb  3.3  /  TBili  0.6  /  DBili  x   /  AST  24  /  ALT  18  /  AlkPhos  150<H>  03-17          RADIOLOGY & ADDITIONAL TESTS:    TELE:    EKG:

## 2018-03-18 NOTE — PROGRESS NOTE ADULT - PROBLEM SELECTOR PLAN 7
- Creatinine still rising, continue to monitor  - Giving some IVFs this AM  - Recently received contrast for cath and AICD

## 2018-03-18 NOTE — PROGRESS NOTE ADULT - PROBLEM SELECTOR PLAN 10
DVT ppx: SCDs  Diet: DASH    11) Acute nonintractable headache  - Patient denies c/o headache currently and no focal neuro deficits  - If persistent or new neuro deficits consider CT head. Not associated with hypertension at time of headache.   - Tylenol PRN  - Hold off on CT head at this time    Aryles Hedjar, PGY-2  [facility] Pager [phone]  Department of Internal Medicine

## 2018-03-18 NOTE — CONSULT NOTE ADULT - ATTENDING COMMENTS
as above-  Multifactorial Dyspnea-COPD, CHF, pleural disease (right side), deconditioning.  Likely to need Right Thoro +/- decortication based on HX-(may be chronically aspirating-multiple hospitalization)--to re-evaluate tomorrow ([place])  In addition concern over ZORAIDA nodule-likely malignant--will need PET/CT eventual and at minimum a follow up in 2-3 months--strongly consider Thoracic evaluation.  Out pt --PFTs and sleep study; Speech and Swallow evaluation.    Oral Aviles MD-Pulmonary   [phone]

## 2018-03-18 NOTE — PROGRESS NOTE ADULT - ASSESSMENT
81 yo Welsh speaking M PMHx DMT2, HTN, R endarterectomy (7/2017) c/b bleed, partial SBO, admission 10/2017 with septic shock and respiratory failure c/ chest tube and pleurodesis, admitted Jan 2018 for anemia s/p emergent EGD (hg of 5.4) with evidence duodenal ulcers/duodenitis/sessile duodenal polyp w/ elevated troponins, renal failure, CXR concerning for persistent RLL process and effusion, paroxysmal Afib/flutter presenting from OSH as CCU transfer for ADHF in setting of ?hypertensive emergency c/b aflutter with variable block/junctional escape and polymorphic VT now s/p dual chamber AICD without further episodes of VT. s/p LHC with 85% stenosis of proximal cx and  of pRCA without intervention.

## 2018-03-18 NOTE — PROGRESS NOTE ADULT - PROBLEM SELECTOR PLAN 5
- Please see physical chart for review of OSH records  - c/w PPI  - H pylori stool antigen and antibody (no evidence of H pylori on pathology report however)  - iron studies more consistent with anemia of chronic disease. Hb is stable  - Will f/u with GI to determine plans for further scoping

## 2018-03-18 NOTE — PROGRESS NOTE ADULT - PROBLEM SELECTOR PLAN 1
- BNP 5000s showing improvement  - Continue on Lasix at 40 IVP BID  - Strict I/Os and daily weights. Net even today  - Continue PRN duonebs  - Patient is currently on room air, with mild R pleural effusion, may be 2/2 HF but less likely if only R sided. Pulmonary consulted.

## 2018-03-18 NOTE — CONSULT NOTE ADULT - PROBLEM SELECTOR RECOMMENDATION 6
low suspicion of signficant PNA given his lack of sx and fever.   - if continuing abx, then suggest short course - 5 d.

## 2018-03-18 NOTE — CONSULT NOTE ADULT - PROBLEM SELECTOR RECOMMENDATION 9
Simple loculated pleural effusion. Given history, this is most likely 2/2 cardiac etiology. He only a has small pocket posterior -- tracts anteriorly, though not a great pocket. There is no urgency to sample this as he is relatively asymptomatic and on room air. He does not have a cough or fevers -- low suspicion for empyema.   - therefore recommend continued diuresis and will reassess malcom.   - if a reasonable pocket is present, then will consider diagnostic / therapeutic thoracentesis. Simple loculated pleural effusion. Given history, this is most likely 2/2 cardiac etiology-however he has had multiple hospital visits-?aspiration chronicly.   He only a has small pocket posterior -- tracts anteriorly, though not a great pocket. There is no urgency to sample this as he is relatively asymptomatic and on room air. He does not have a cough or fevers -- low suspicion for empyema-though at risk.  - therefore recommend continued diuresis and will reassess malcom.   - if a reasonable pocket is present, then will consider diagnostic / therapeutic thoracentesis.

## 2018-03-18 NOTE — CHART NOTE - NSCHARTNOTEFT_GEN_A_CORE
stat echo performed. initial results show no pericardial effusion    will hold diuretics, decrease coreg to 6.25 BID, check orthostatics, hold plavix today    please contact GI regarding long term use of oral anticoagulant and plavix possibly ASA

## 2018-03-18 NOTE — PROGRESS NOTE ADULT - PROBLEM SELECTOR PLAN 2
- Patient's BP over past few days has decreased from nearly 180s SBP to 110s. No complaints from patient and patient is not orthostatic. Will monitor for now.  - Decreased Carvedilol to 6.25 BID  - Consider restarting HCTZ at 12.5 or nifedipine if requires additional blood pressure control, currently more hypotensive  - Continue to hold ARB and spironolactone in setting of PAULIE  - Will still need further medical reconciliation regarding home medications (in outpatient medication review on spironolactone, hydrochlorothiazide, valsartan and nifedipine, however valsartan-HCTZ and a few other of his medications may have been discontinued per paperwork from previous hospitalization).

## 2018-03-18 NOTE — PROGRESS NOTE ADULT - PROBLEM SELECTOR PLAN 3
- Patient with moderate loculated pleural effusion  - Although patient was intubated for AICD placement, low concern for PNA, as patient is afebrile  - Mild leukocytosis, but may be multifactorial and downtrending  - Will monitor off abx, pulmonary consulted, and will f/u recommendations including possible thoracentesis

## 2018-03-18 NOTE — CONSULT NOTE ADULT - PROBLEM SELECTOR RECOMMENDATION 3
check QT interval. consider repeat ischemic evaluation. CCU team to make final decision on need for cath
s/p PPM. care per EP.

## 2018-03-18 NOTE — CONSULT NOTE ADULT - PROBLEM SELECTOR RECOMMENDATION 2
AFlutter of unknown duration. Ideally should be on heparin/ oral anticoagulation but recent large GO bleed. Consider GI evaluation prior to initiation of anticoagulation.
significant improvement in his symptoms. per cardiology team. suggest continued diuresis for the effusion.

## 2018-03-19 ENCOUNTER — TRANSCRIPTION ENCOUNTER (OUTPATIENT)
Age: 83
End: 2018-03-19

## 2018-03-19 ENCOUNTER — RESULT REVIEW (OUTPATIENT)
Age: 83
End: 2018-03-19

## 2018-03-19 DIAGNOSIS — I48.91 UNSPECIFIED ATRIAL FIBRILLATION: ICD-10-CM

## 2018-03-19 LAB
ALBUMIN FLD-MCNC: 1.5 G/DL — SIGNIFICANT CHANGE UP
ANION GAP SERPL CALC-SCNC: 12 MMOL/L — SIGNIFICANT CHANGE UP (ref 5–17)
B PERT IGG+IGM PNL SER: ABNORMAL
BUN SERPL-MCNC: 47 MG/DL — HIGH (ref 7–23)
CALCIUM SERPL-MCNC: 9.2 MG/DL — SIGNIFICANT CHANGE UP (ref 8.4–10.5)
CHLORIDE SERPL-SCNC: 102 MMOL/L — SIGNIFICANT CHANGE UP (ref 96–108)
CO2 SERPL-SCNC: 26 MMOL/L — SIGNIFICANT CHANGE UP (ref 22–31)
COLOR FLD: YELLOW — SIGNIFICANT CHANGE UP
CREAT SERPL-MCNC: 1.41 MG/DL — HIGH (ref 0.5–1.3)
EOSINOPHIL # FLD: 2 % — SIGNIFICANT CHANGE UP
FLUID INTAKE SUBSTANCE CLASS: SIGNIFICANT CHANGE UP
FLUID SEGMENTED GRANULOCYTES: 8 % — SIGNIFICANT CHANGE UP
GLUCOSE BLDC GLUCOMTR-MCNC: 167 MG/DL — HIGH (ref 70–99)
GLUCOSE BLDC GLUCOMTR-MCNC: 178 MG/DL — HIGH (ref 70–99)
GLUCOSE BLDC GLUCOMTR-MCNC: 271 MG/DL — HIGH (ref 70–99)
GLUCOSE BLDC GLUCOMTR-MCNC: 330 MG/DL — HIGH (ref 70–99)
GLUCOSE FLD-MCNC: 200 MG/DL — SIGNIFICANT CHANGE UP
GLUCOSE SERPL-MCNC: 189 MG/DL — HIGH (ref 70–99)
HCT VFR BLD CALC: 35.4 % — LOW (ref 39–50)
HGB BLD-MCNC: 11 G/DL — LOW (ref 13–17)
LDH SERPL L TO P-CCNC: 146 U/L — SIGNIFICANT CHANGE UP
LYMPHOCYTES # FLD: 74 % — SIGNIFICANT CHANGE UP
MAGNESIUM SERPL-MCNC: 2.2 MG/DL — SIGNIFICANT CHANGE UP (ref 1.6–2.6)
MCHC RBC-ENTMCNC: 26.4 PG — LOW (ref 27–34)
MCHC RBC-ENTMCNC: 30.9 GM/DL — LOW (ref 32–36)
MCV RBC AUTO: 85.2 FL — SIGNIFICANT CHANGE UP (ref 80–100)
MONOS+MACROS # FLD: 9 % — SIGNIFICANT CHANGE UP
OTHER CELLS FLD MANUAL: 7 % — SIGNIFICANT CHANGE UP
PH FLD: 7.46 — SIGNIFICANT CHANGE UP
PHOSPHATE SERPL-MCNC: 3.2 MG/DL — SIGNIFICANT CHANGE UP (ref 2.5–4.5)
PLATELET # BLD AUTO: 383 K/UL — SIGNIFICANT CHANGE UP (ref 150–400)
POTASSIUM SERPL-MCNC: 3.7 MMOL/L — SIGNIFICANT CHANGE UP (ref 3.5–5.3)
POTASSIUM SERPL-SCNC: 3.7 MMOL/L — SIGNIFICANT CHANGE UP (ref 3.5–5.3)
PROT FLD-MCNC: 2.9 G/DL — SIGNIFICANT CHANGE UP
RBC # BLD: 4.16 M/UL — LOW (ref 4.2–5.8)
RBC # FLD: 16.2 % — HIGH (ref 10.3–14.5)
RCV VOL RI: 4750 /UL — HIGH (ref 0–5)
SODIUM SERPL-SCNC: 140 MMOL/L — SIGNIFICANT CHANGE UP (ref 135–145)
TOTAL NUCLEATED CELL COUNT, BODY FLUID: 135 /UL — HIGH (ref 0–5)
TUBE TYPE: SIGNIFICANT CHANGE UP
WBC # BLD: 10.8 K/UL — HIGH (ref 3.8–10.5)
WBC # FLD AUTO: 10.8 K/UL — HIGH (ref 3.8–10.5)

## 2018-03-19 PROCEDURE — 88305 TISSUE EXAM BY PATHOLOGIST: CPT | Mod: 26

## 2018-03-19 PROCEDURE — 88112 CYTOPATH CELL ENHANCE TECH: CPT | Mod: 26

## 2018-03-19 PROCEDURE — 32555 ASPIRATE PLEURA W/ IMAGING: CPT | Mod: GC

## 2018-03-19 PROCEDURE — 71045 X-RAY EXAM CHEST 1 VIEW: CPT | Mod: 26

## 2018-03-19 PROCEDURE — 99223 1ST HOSP IP/OBS HIGH 75: CPT | Mod: AI

## 2018-03-19 PROCEDURE — 88108 CYTOPATH CONCENTRATE TECH: CPT | Mod: 26,59

## 2018-03-19 PROCEDURE — 99233 SBSQ HOSP IP/OBS HIGH 50: CPT | Mod: GC

## 2018-03-19 PROCEDURE — 85060 BLOOD SMEAR INTERPRETATION: CPT

## 2018-03-19 RX ORDER — PIPERACILLIN AND TAZOBACTAM 4; .5 G/20ML; G/20ML
3.38 INJECTION, POWDER, LYOPHILIZED, FOR SOLUTION INTRAVENOUS EVERY 8 HOURS
Qty: 0 | Refills: 0 | Status: DISCONTINUED | OUTPATIENT
Start: 2018-03-19 | End: 2018-03-21

## 2018-03-19 RX ORDER — VANCOMYCIN HCL 1 G
750 VIAL (EA) INTRAVENOUS EVERY 12 HOURS
Qty: 0 | Refills: 0 | Status: DISCONTINUED | OUTPATIENT
Start: 2018-03-19 | End: 2018-03-21

## 2018-03-19 RX ORDER — POTASSIUM CHLORIDE 20 MEQ
40 PACKET (EA) ORAL ONCE
Qty: 0 | Refills: 0 | Status: COMPLETED | OUTPATIENT
Start: 2018-03-19 | End: 2018-03-19

## 2018-03-19 RX ORDER — CLOPIDOGREL BISULFATE 75 MG/1
75 TABLET, FILM COATED ORAL DAILY
Qty: 0 | Refills: 0 | Status: DISCONTINUED | OUTPATIENT
Start: 2018-03-19 | End: 2018-03-23

## 2018-03-19 RX ORDER — FUROSEMIDE 40 MG
40 TABLET ORAL DAILY
Qty: 0 | Refills: 0 | Status: DISCONTINUED | OUTPATIENT
Start: 2018-03-19 | End: 2018-03-20

## 2018-03-19 RX ORDER — VANCOMYCIN HCL 1 G
250 VIAL (EA) INTRAVENOUS ONCE
Qty: 0 | Refills: 0 | Status: DISCONTINUED | OUTPATIENT
Start: 2018-03-19 | End: 2018-03-19

## 2018-03-19 RX ORDER — LACTULOSE 10 G/15ML
10 SOLUTION ORAL DAILY
Qty: 0 | Refills: 0 | Status: COMPLETED | OUTPATIENT
Start: 2018-03-19 | End: 2018-03-20

## 2018-03-19 RX ORDER — VANCOMYCIN HCL 1 G
500 VIAL (EA) INTRAVENOUS EVERY 24 HOURS
Qty: 0 | Refills: 0 | Status: DISCONTINUED | OUTPATIENT
Start: 2018-03-19 | End: 2018-03-19

## 2018-03-19 RX ADMIN — PANTOPRAZOLE SODIUM 40 MILLIGRAM(S): 20 TABLET, DELAYED RELEASE ORAL at 05:22

## 2018-03-19 RX ADMIN — SENNA PLUS 2 TABLET(S): 8.6 TABLET ORAL at 22:00

## 2018-03-19 RX ADMIN — CLOPIDOGREL BISULFATE 75 MILLIGRAM(S): 75 TABLET, FILM COATED ORAL at 20:11

## 2018-03-19 RX ADMIN — Medication 1 DROP(S): at 05:21

## 2018-03-19 RX ADMIN — Medication 325 MILLIGRAM(S): at 11:31

## 2018-03-19 RX ADMIN — DORZOLAMIDE HYDROCHLORIDE TIMOLOL MALEATE 1 DROP(S): 20; 5 SOLUTION/ DROPS OPHTHALMIC at 05:21

## 2018-03-19 RX ADMIN — BRIMONIDINE TARTRATE 1 DROP(S): 2 SOLUTION/ DROPS OPHTHALMIC at 05:21

## 2018-03-19 RX ADMIN — Medication 4: at 12:04

## 2018-03-19 RX ADMIN — Medication 500 MILLIGRAM(S): at 11:31

## 2018-03-19 RX ADMIN — LOTEPREDNOL ETABONATE 1 DROP(S): 2 SUSPENSION/ DROPS OPHTHALMIC at 19:56

## 2018-03-19 RX ADMIN — HEPARIN SODIUM 5000 UNIT(S): 5000 INJECTION INTRAVENOUS; SUBCUTANEOUS at 05:22

## 2018-03-19 RX ADMIN — LACTULOSE 10 GRAM(S): 10 SOLUTION ORAL at 18:42

## 2018-03-19 RX ADMIN — Medication 1 DROP(S): at 21:45

## 2018-03-19 RX ADMIN — ATORVASTATIN CALCIUM 40 MILLIGRAM(S): 80 TABLET, FILM COATED ORAL at 22:05

## 2018-03-19 RX ADMIN — HEPARIN SODIUM 5000 UNIT(S): 5000 INJECTION INTRAVENOUS; SUBCUTANEOUS at 22:01

## 2018-03-19 RX ADMIN — Medication 150 MILLIGRAM(S): at 18:50

## 2018-03-19 RX ADMIN — PIPERACILLIN AND TAZOBACTAM 25 GRAM(S): 4; .5 INJECTION, POWDER, LYOPHILIZED, FOR SOLUTION INTRAVENOUS at 14:18

## 2018-03-19 RX ADMIN — BACITRACIN 1 APPLICATION(S): 500 OINTMENT OPHTHALMIC at 11:33

## 2018-03-19 RX ADMIN — Medication 1: at 08:23

## 2018-03-19 RX ADMIN — Medication 1: at 22:03

## 2018-03-19 RX ADMIN — CARVEDILOL PHOSPHATE 6.25 MILLIGRAM(S): 80 CAPSULE, EXTENDED RELEASE ORAL at 18:42

## 2018-03-19 RX ADMIN — Medication 100 MILLIGRAM(S): at 11:30

## 2018-03-19 RX ADMIN — LOTEPREDNOL ETABONATE 1 DROP(S): 2 SUSPENSION/ DROPS OPHTHALMIC at 05:23

## 2018-03-19 RX ADMIN — PIPERACILLIN AND TAZOBACTAM 25 GRAM(S): 4; .5 INJECTION, POWDER, LYOPHILIZED, FOR SOLUTION INTRAVENOUS at 22:02

## 2018-03-19 RX ADMIN — Medication 100 MILLIGRAM(S): at 05:22

## 2018-03-19 RX ADMIN — CARVEDILOL PHOSPHATE 6.25 MILLIGRAM(S): 80 CAPSULE, EXTENDED RELEASE ORAL at 05:22

## 2018-03-19 RX ADMIN — Medication 40 MILLIEQUIVALENT(S): at 10:09

## 2018-03-19 RX ADMIN — Medication 1 DROP(S): at 14:22

## 2018-03-19 RX ADMIN — Medication 1: at 16:51

## 2018-03-19 RX ADMIN — DORZOLAMIDE HYDROCHLORIDE TIMOLOL MALEATE 1 DROP(S): 20; 5 SOLUTION/ DROPS OPHTHALMIC at 19:56

## 2018-03-19 RX ADMIN — Medication 100 MILLIGRAM(S): at 18:41

## 2018-03-19 RX ADMIN — TAMSULOSIN HYDROCHLORIDE 0.4 MILLIGRAM(S): 0.4 CAPSULE ORAL at 22:01

## 2018-03-19 NOTE — CONSULT NOTE ADULT - ASSESSMENT
82 year old Czech speaking Male with PMHx significant for CAD, TIA, DM Type 2, HTN, AFib (Not on A/C),COPD,  R endarterectomy (7/2017) c/b bleed, partial SBO, admission 10/2017 with septic shock and respiratory failure c/ chest tube and pleurodesis, admitted Jan 2018 for GIB/ anemia s/p emergent EGD (hg of 5.4) with evidence duodenal ulcers/duodenitis/sessile duodenal polyp w/ elevated troponins, renal failure, CXR concerning for persistent RLL process and effusion, paroxysmal Afib/flutter presenting from OSH as CCU transfer for ADHF in setting of ?hypertensive emergency c/b aflutter with Complete Heart Block and polymorphic VT now s/p ICD on 3/16 without further episodes of VT. S/p LHC on 3/15 with 85% stenosis of proximal cx and 100% chronic lesion of pRCA without intervention. Patient is s/p bedside thoracentesis today, and is on IV antibiotics for possible Aspiration Pneumonia.

## 2018-03-19 NOTE — PROGRESS NOTE ADULT - ASSESSMENT
Impression:    1. History of GIB 2/2 peptic ulcer (duodenal). Although the patient had prior severe GI bleed, there has been no GI bleeding recently for the last few months despite the patient being on plavix.    2. History of post-polypectomy bleed    3. AFIB/complete heart block    Recommendation:  -no plan for endoscopy at this time prior to a/c. Will confirm with GI attending today.    -PPI prophylaxis, test and treat for H. pylori (stool/serum tests)

## 2018-03-19 NOTE — DISCHARGE NOTE ADULT - CARE PROVIDER_API CALL
Aneudy Cabrera  Cardiology  Address: [address] [place] [address]  Phone: [phone]  Phone: (   )    -  Fax: (   )    -    Jenni Coker), Cardiology  [address] [place] [address]  Phone: [phone]  Fax: [phone] Jenni Coker), Cardiology  [address] [place] [address]  Phone: [phone]  Fax: [phone]    Aneudy Cabrera  Cardiology  Address: [address] [place] [address]  Phone: [phone]  Phone: (   )    -  Fax: (   )    -    Weston Muller  Primary care doctor  [phone]  Phone: (   )    -  Fax: (   )    -    Pulmonology clinic at [facility],   [address] [place] [address] [address]   [place], [place]  [phone]  Phone: (   )    -  Fax: (   )    - Jenni Coker), Cardiology  [address] [place] [address]  Phone: [phone]  Fax: [phone]    Aneudy Cabrera  Cardiology  Address: [address] [place] [address]  Phone: [phone]  Phone: (   )    -  Fax: (   )    -    Pulmonology clinic at [facility],   [address]   [place], [place]  [phone]  Phone: (   )    -  Fax: (   )    -    Electrophysiology clinic (for atrial fibrilation) at [facility],   [address]st [place] [address]  [phone]  Phone: (   )    -  Fax: (   )    -

## 2018-03-19 NOTE — DISCHARGE NOTE ADULT - COMMUNITY RESOURCES
Home attendant services through [facility] to resume on 03/24/2018 at your regular scheduled time. Please call your home health aide to confirm arrival time.

## 2018-03-19 NOTE — DISCHARGE NOTE ADULT - MEDICATION SUMMARY - MEDICATIONS TO STOP TAKING
I will STOP taking the medications listed below when I get home from the hospital:    NIFEdipine 90 mg oral tablet, extended release  -- 1 tab(s) by mouth once a day    rosuvastatin 5 mg oral tablet  -- 1 tab(s) by mouth once a day (at bedtime)    spironolactone 50 mg oral tablet  -- 1 tab(s) by mouth once a day    valsartan-hydrochlorothiazide 80mg-12.5mg oral tablet  -- 1 tab(s) by mouth once a day

## 2018-03-19 NOTE — DISCHARGE NOTE ADULT - PLAN OF CARE
Manage You presented to [facility] in a heart rhythm where the two parts of the heart were not pumping synchronously. This can be dangerous. You received a pacemaker to make sure your heart pumps properly. See your cardiologist within 1-2 weeks of discharge for further management and treatment. You had a heart catheter in the hospital on 3/15 which showed a blockage in one of the heart arteries. A You had a heart catheter in the hospital on 3/15 which showed a blockage in one of the heart arteries. Intervention was postponed until it was decided whether it would be safe for you to stay on blood thinners for longterm. See your cardiologist within 1-2 weeks of discharge for further management and treatment. You had trouble breathing which was thought to be due to the heart having trouble keeping up with the fluid in your body, causing it to go to the lungs. You were giving diuretics which helped you breathe. See your cardiologist within 1-2 weeks of discharge for further management and treatment. You had a cat scan of the chest which found a 1cm nodule in your left upper lung. You need to followup with the pulmonologist within 1-2 weeks of discharge for pulmonary function tests. You need a repeat cat scan of the chest in 3 months to see if this nodule grows or is stable. With your long smoking history it is possible this nodule is cancer or it could be benign. You were lethargic which made you need to be intubated to make sure you breathed properly. It is possible you aspirated on your oral secretions. You were treated with a short course of antibiotics for possible pneumonia. See your primary care doctor within 1-2 weeks of discharge for further management and treatment. 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 You had a pacemaker placed on 3/15/2018 in order to protect you from this dangerous heart rate. See your cardiologist within 1-2 weeks of discharge for further management and treatment. You previously had low blood levels. The gastroenterologist at the outside hospital performed an endoscopy and found an ulcer in your duodenum small intestine that was not bleeding. This is a possible source of bleeding and low blood counts. The gastroenterologist doctors spoke with you this hospitalization and did not think you needed to have another endoscopy. You presented to [facility] in a heart rhythm where the two parts of the heart were not pumping synchronously. This can be dangerous. You received a [employer] implantable cardioverter defibrillator (ICD) with pacemaker on 3/15 to make sure your heart pumps properly. See your cardiologist within 1-2 weeks of discharge for further management and treatment. You had a heart catheter in the hospital on 3/15 which showed a blockage in one of the heart arteries. Intervention was postponed until it was decided whether it would be safe for you to stay on blood thinners for longterm. You had another heart cath 3/21 and you got a drug-eluting stent to your circumflex artery. You need to take your aspirin and plavix daily so your stent doesn't clot closed and cause a heart attack. If you have signs of bleeding like in your stool, go to the emergency room without delay. See your cardiologist within 1-2 weeks of discharge for further management and treatment. You had a cat scan of the chest which found a 1cm nodule in your left upper lung. You need to followup with the pulmonologist 1 month after discharge for pulmonary function tests. You need a repeat cat scan of the chest in 2-3 months to see if this nodule grows or is stable. With your long smoking history it is possible this nodule is cancer or it could be benign. 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 You had a defibrillator and pacemaker placed on 3/15/2018 in order to protect you from this dangerous heart rate. See your cardiologist within 1-2 weeks of discharge for further management and treatment. You had a heart catheterization in the hospital on 3/15 which showed a blockage in one of the heart arteries. Intervention was postponed until it was decided whether it would be safe for you to stay on blood thinners for longterm. You had another heart cath 3/21 and you got a drug-eluting stent to your circumflex artery. You need to take your aspirin and plavix daily so your stent doesn't close and cause a heart attack. If you have signs of bleeding like in your stool, go to the emergency room without delay. See your cardiologist within 1-2 weeks of discharge for further management and treatment. You had fluid in your lungs which was removed during this hospitalization which seem to help improve your breathing. It is very important you follow up in the Pulmonary clinic for further results of this fluid. You will need repeat imaging in 2-3 months to assess your lungs. You presented to [facility] in a heart rhythm where the two parts of the heart were not pumping synchronously. This can be dangerous. You received a Medtronic implantable cardioverter defibrillator (ICD) with pacemaker on 3/15 to make sure your heart pumps properly. See your heart rhythm doctor (Dr. Coker) within 1 week of discharge for further management and treatment. You had a heart catheterization in the hospital on 3/15 which showed a blockage in one of the heart arteries. Intervention was postponed until it was decided whether it would be safe for you to stay on blood thinners for longterm. You had another heart cath 3/21 and you got a drug-eluting stent to your circumflex artery. You need to take your aspirin and plavix daily so your stent doesn't close and cause a heart attack. If you have signs of bleeding like in your stool, go to the emergency room without delay. See your cardiologist (Dr. Cabrera) within 1 week of discharge for further management and treatment. You had trouble breathing which was thought to be due to the heart having trouble keeping up with the fluid in your body, causing it to go to the lungs. You were giving diuretics which helped you breathe. See your cardiologist (Dr. Cabrera) within 1 week of discharge for further management and treatment. You have been in the irregular heart rhythm atrial fibrillation/atrial flutter which increases your risk of stroke. The heart rhythm doctor recommended that you start a blood thinner (anticoagulation) in the hospital, but you preferred to go home and speak to Dr. Muller and Dr. Cabrera. You did not want to stay in the hospital to initiate the blood thinner coumadin. We offered to make you an appointment with Dr. Cabrera, but you said you preferred to call yourself to make an appointment for next week after discharge. The other heart rhythm doctor will evaluate you as an outpatient at clinic whether to insert a Watchman in the heart which decreases the risk of stroke. You were lethargic which made you need to be intubated to make sure you breathed properly. It is possible you aspirated on your oral secretions. You were treated with a short course of antibiotics for possible pneumonia. See your primary care doctor within 1-2 weeks of discharge for further management and treatment.    History of duodenal ulcer: You previously had low blood levels. The gastroenterologist at the outside hospital performed an endoscopy and found an ulcer in your duodenum small intestine that was not bleeding. This is a possible source of bleeding and low blood counts. The gastroenterologist doctors spoke with you this hospitalization and did not think you needed to have another endoscopy. They do not think your previous bleeding from the intestine should prevent you from taking aspirin, plavix, and coumadin longterm.

## 2018-03-19 NOTE — CONSULT NOTE ADULT - PROBLEM SELECTOR RECOMMENDATION 9
-CHADSVasc Score 7  -GI called, awaiting GI input regarding long term anticoagulation for AFib, vs.   Watchman procedure with short term anticoagulation   -Watchman procedure discussed with patient/ wife at bedside   -Please perform RADHA to assess for candidacy of Watchman   -If patient is candidate, Watchman will likely need to be done out patient.    LAM[place] [id number]

## 2018-03-19 NOTE — PROGRESS NOTE ADULT - PROBLEM SELECTOR PLAN 10
DVT ppx: SCDs  Diet: DASH    11) Acute nonintractable headache  - Patient denies c/o headache currently and no focal neuro deficits  - If persistent or new neuro deficits consider CT head. Not associated with hypertension at time of headache.   - Tylenol PRN  - Hold off on CT head at this time    Aryles Hedjar, PGY-2  [facility] Pager [phone]  Department of Internal Medicine DVT ppx: SCDs  Diet: DASH  11) Acute nonintractable headache  - Patient denies c/o headache currently and no focal neuro deficits  - If persistent or new neuro deficits consider CT head. Not associated with hypertension at time of headache.   - Tylenol PRN  - Hold off on CT head at this time DVT ppx: SCDs  Bowel: bowel regimen  Diet: DASH  11) Acute nonintractable headache  - Patient denies c/o headache currently and no focal neuro deficits  - If persistent or new neuro deficits consider CT head. Not associated with hypertension at time of headache.   - Tylenol PRN  - Hold off on CT head at this time

## 2018-03-19 NOTE — CONSULT NOTE ADULT - ATTENDING COMMENTS
seen and examined with NP. I agree with H & P, A & P.  Consider RADHA to assess candidacy for LYNETTE occlusion (watchman).  Will need GI clearance for RADHA as well as at least short term AC> seen and examined with NP. I agree with H & P, A & P.  Consider RADHA to assess candidacy for LYNETTE occlusion (watchman).  Will need GI clearance for RADHA as well as at least short term AC.  Alternatively may consider ASAP TOO randomization.

## 2018-03-19 NOTE — CONSULT NOTE ADULT - SUBJECTIVE AND OBJECTIVE BOX
CHIEF COMPLAINT: Patient currently resting in bed without complaints.  Denies c/o CP, palpitations or SOB.  Denies further GI Bleeding or dark stools.   Patient's wife at bedside translating for patient, she refuses the need for  phone.    HISTORY OF PRESENT ILLNESS: Patient is being evaluated for Watchman procedure in the setting of recent GIB, awaiting GI input regarding anticoagulation for AFib.       Allergies    No Known Allergies    	  MEDICATIONS:  carvedilol 6.25 milliGRAM(s) Oral every 12 hours  clopidogrel Tablet 75 milliGRAM(s) Oral daily  furosemide    Tablet 40 milliGRAM(s) Oral daily  heparin  Injectable 5000 Unit(s) SubCutaneous every 12 hours  tamsulosin 0.4 milliGRAM(s) Oral daily    piperacillin/tazobactam IVPB. 3.375 Gram(s) IV Intermittent every 8 hours  vancomycin  IVPB 750 milliGRAM(s) IV Intermittent every 12 hours  vancomycin  IVPB 250 milliGRAM(s) IV Intermittent once    ALBUTerol/ipratropium for Nebulization 3 milliLiter(s) Nebulizer every 6 hours PRN    acetaminophen   Tablet. 650 milliGRAM(s) Oral every 6 hours PRN    docusate sodium 100 milliGRAM(s) Oral two times a day  lactulose Syrup 10 Gram(s) Oral daily  pantoprazole    Tablet 40 milliGRAM(s) Oral before breakfast  polyethylene glycol 3350 17 Gram(s) Oral daily  senna 2 Tablet(s) Oral at bedtime    atorvastatin 40 milliGRAM(s) Oral at bedtime  dextrose 50% Injectable 12.5 Gram(s) IV Push once  dextrose 50% Injectable 25 Gram(s) IV Push once  dextrose 50% Injectable 25 Gram(s) IV Push once  dextrose Gel 1 Dose(s) Oral once PRN  glucagon  Injectable 1 milliGRAM(s) IntraMuscular once PRN  insulin lispro (HumaLOG) corrective regimen sliding scale   SubCutaneous at bedtime  insulin lispro (HumaLOG) corrective regimen sliding scale   SubCutaneous three times a day before meals    artificial  tears Solution 1 Drop(s) Both EYES three times a day  ascorbic acid 500 milliGRAM(s) Oral daily  BACItracin   Ophthalmic Ointment 1 Application(s) Right EYE daily  brimonidine 0.2% Ophthalmic Solution 1 Drop(s) Right EYE two times a day  dextrose 5%. 1000 milliLiter(s) IV Continuous <Continuous>  dorzolamide 2%/timolol 0.5% Ophthalmic Solution 1 Drop(s) Both EYES two times a day  ferrous    sulfate 325 milliGRAM(s) Oral daily  loteprednol 0.5% Ophthalmic Suspension 1 Drop(s) Both EYES two times a day  sodium chloride 0.9%. 1000 milliLiter(s) IV Continuous <Continuous>      PAST MEDICAL & SURGICAL HISTORY:  Atrial flutter, unspecified type  Type 2 diabetes mellitus without complication, without long-term current use of insulin  Essential hypertension  Polyp of colon, unspecified part of colon, unspecified type  History of carotid endarterectomy      FAMILY HISTORY:  No pertinent family history in first degree relatives    REVIEW OF SYSTEMS:  See HPI. Otherwise, 10 point ROS done and otherwise negative.    PHYSICAL EXAM:  T(C): 36.8 (03-19-18 @ 12:11), Max: 36.9 (03-18-18 @ 20:09)  HR: 69 (03-19-18 @ 12:11) (59 - 69)  BP: 137/73 (03-19-18 @ 12:11) (121/62 - 153/68)  RR: 18 (03-19-18 @ 12:11) (17 - 18)  SpO2: 98% (03-19-18 @ 12:11) (93% - 98%)      18 Mar 2018 07:01  -  19 Mar 2018 07:00  --------------------------------------------------------  IN: 485 mL / OUT: 2800 mL / NET: -2315 mL    19 Mar 2018 07:01  -  19 Mar 2018 18:04  --------------------------------------------------------  IN: 665 mL / OUT: 2000 mL / NET: -1335 mL      Appearance: Normal	  Cardiovascular: Normal S1 S2, No JVD, No murmurs, No edema  Respiratory: Lungs with crackles at bilateral bases.   Psychiatry: A & O x 3, Forgetful, Mood & affect appropriate  Gastrointestinal:  Soft, Non-tender, + BS	  Skin: No rashes, No ecchymoses, No cyanosis	  Neurologic: Non-focal  Extremities: Normal range of motion, No clubbing, cyanosis or edema  Vascular: Peripheral pulses palpable 2+ bilaterally      LABS:	 	    CBC Full  -  ( 19 Mar 2018 09:36 )  WBC Count : 10.8 K/uL  Hemoglobin : 11.0 g/dL  Hematocrit : 35.4 %  Platelet Count - Automated : 383 K/uL  Mean Cell Volume : 85.2 fl  Mean Cell Hemoglobin : 26.4 pg  Mean Cell Hemoglobin Concentration : 30.9 gm/dL  Auto Neutrophil # : x  Auto Lymphocyte # : x  Auto Monocyte # : x  Auto Eosinophil # : x  Auto Basophil # : x  Auto Neutrophil % : x  Auto Lymphocyte % : x  Auto Monocyte % : x  Auto Eosinophil % : x  Auto Basophil % : x    03-19    140  |  102  |  47<H>  ----------------------------<  189<H>  3.7   |  26  |  1.41<H>  03-18    141  |  102  |  51<H>  ----------------------------<  207<H>  3.7   |  27  |  1.60<H>    Ca    9.2      19 Mar 2018 09:08  Ca    8.7      18 Mar 2018 05:58  Phos  3.2     03-19  Phos  3.5     03-18  Mg     2.2     03-19  Mg     2.3     03-18            TELEMETRY: 	  AFib, V Paced 60's   ECG:  	AFib, V Paced 60 bpm

## 2018-03-19 NOTE — PROGRESS NOTE ADULT - PROBLEM SELECTOR PLAN 8
- Creatinine still rising, continue to monitor  - Giving some IVFs this AM  - Recently received contrast for cath and AICD - Creatinine still rising, continue to monitor  - Recently received contrast for cath and AICD

## 2018-03-19 NOTE — PROGRESS NOTE ADULT - PROBLEM SELECTOR PLAN 5
- Please see physical chart for review of OSH records  - c/w PPI  - H pylori stool antigen and antibody (no evidence of H pylori on pathology report however)  - iron studies more consistent with anemia of chronic disease. Hb is stable  - Will f/u with GI to determine plans for further scoping - Please see physical chart for review of OSH records  - c/w PPI  - f/u H pylori stool antigen and antibody (no evidence of H pylori on pathology report however)  - iron studies more consistent with anemia of chronic disease. Hb is stable  -no further scoping per GI

## 2018-03-19 NOTE — PROGRESS NOTE ADULT - PROBLEM SELECTOR PLAN 9
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 Rate set to 80 bbm  -keep K>4, Mg>2 Hx complete heart block and Polymorphic VT s/p dual chamber AICD placed 3/15 and no events since. Rate set to 80 bbm  -keep K>4, Mg>2

## 2018-03-19 NOTE — PROGRESS NOTE ADULT - PROBLEM SELECTOR PLAN 6
CT chest found 1cm ZORAIDA nodule, significant smoking hx  -f/u outpatient repeat CT chest in 3 months    COPD: baldo, prn, will need outpatient PFTs    Downtrending. Given abx in the setting of loculated pleural effusion. Will continue for now  -to comlplete 5d course for possible PNA  -c/w vanc 500 qD, zosyn (3/18 - ) CT chest found 1cm ZORAIDA nodule, significant smoking hx  -f/u outpatient repeat CT chest in 3 months  COPD: baldo, jordanan, will need outpatient PFTs  Aspiration pneumonia: will empirically treat for aspiration due to pt needing intubation for airway protection and leukocytosis  -c/w vanc and zosyn (3/19 - ) for 5d course, can d/c on doxycycline  -to comlplete 5d course for possible PNA

## 2018-03-19 NOTE — DISCHARGE NOTE ADULT - HOSPITAL COURSE
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   Wife at bedside provided the history. Patient was very independent and in a good state of health until he had the carotid endarterectomy in July last year which was complicated by some type of bleeding resulting in a month long hospitalization at [place]. Since the event he has had about 2-3 hospitalizations from other complications followed by rehab stays. More recently, the patient has been doing better and living at home with his wife. Wife reports he is able to walk around the house without difficulty and mainly only needs help changing.  This morning, he woke up feeling very short of breath so they called EMS and he was brought to [facility].    Hospital course: Patient was placed on BiPAP at OSH w/ ABG showing PaO2 80 on CPAP 100%.  Given Duoneb, lasix 60mg IV, solumedrol 125mg  Wife reports he denied any F/C, CP, N/V, abd pain, cough, headache or dizziness.  Patient has chronic LE edema since his prior hospitalizations. Wife also reports a large amount of urine come out when they place a fernández in the ED. Patient had persistent bradycardia and was transferred to [facility] for possible PPM. 4/2017- stress test neg for ischemia, at that time in NSR w/ RBBB. 3/2017- echo showed normal LV function w/ moderate concentric hypertrophy. Pt presented from OSH in sustained bradycardia in 30s-40s in atrial flutter with junctional escape rhythm and periods of intermittent runs of polymorphic VT despite electrolytes wnl. Pt was admitted to the CCU. He had a PPM placed 3/15 with rate set to 80 bpm. He had uptrending CE attributed to NSTEMI type II, was CP free during the admission. LHC/RHC 3/15 showed coronary circulation is right dominant, LAD no flow limiting lesions, proximal circumflex tubular 85 % stenosis, proximal  % chronic total occlusion, no intervention was performed. Angioplasty of the prox Cx lesion was postponed until pt's appropriate anticoagulation was decided. 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   Wife at bedside provided the history. Patient was very independent and in a good state of health until he had the carotid endarterectomy in July last year which was complicated by some type of bleeding resulting in a month long hospitalization at [place]. Since the event he has had about 2-3 hospitalizations from other complications followed by rehab stays. More recently, the patient has been doing better and living at home with his wife. Wife reports he is able to walk around the house without difficulty and mainly only needs help changing.  This morning, he woke up feeling very short of breath so they called EMS and he was brought to [facility].    Hospital course: Patient was placed on BiPAP at OSH w/ ABG showing PaO2 80 on CPAP 100%.  Given Duoneb, lasix 60mg IV, solumedrol 125mg  Wife reports he denied any F/C, CP, N/V, abd pain, cough, headache or dizziness.  Patient has chronic LE edema since his prior hospitalizations. Wife also reports a large amount of urine come out when they place a fernández in the ED. Patient had persistent bradycardia and was transferred to [facility] for possible PPM. 4/2017- stress test neg for ischemia, at that time in NSR w/ RBBB. 3/2017- echo showed normal LV function w/ moderate concentric hypertrophy. Pt presented from OSH in sustained bradycardia in 30s-40s in atrial flutter with junctional escape rhythm and periods of intermittent runs of polymorphic VT despite electrolytes wnl. Pt was admitted to the CCU. He was short of breath, started on lasix gtt, solumedrol, nitro gtt. He was intubated for airway protection. Pt was treated with 5d course of vanc and zosyn for possible aspiration pneumonia _____. He had a PPM placed 3/15 with rate set to 80 bpm. He had uptrending CE attributed to NSTEMI type II, was CP free during the admission. TTE 3/14 showed grossly normal LV systolic fxn, mild pulmonary HTN. LHC/RHC 3/15 showed coronary circulation is right dominant, LAD no flow limiting lesions, proximal circumflex tubular 85 % stenosis, proximal  % chronic total occlusion, no intervention was performed. Angioplasty of the prox Cx lesion was postponed until pt's appropriate anticoagulation was decided. GI consulted for evaluation for bleeding risk in anticipation of possible watchman and possible DAPT if pt undergoes angioplasty for LAD lesion, who decided no further endoscopy this admission. To work up hypoxia, CT chest showed moderate loculated pleural effusion. Pulm was consutled, R pleurocentesis 3/19 aspirated 600cc serous fluid. 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   Wife at bedside provided the history. Patient was very independent and in a good state of health until he had the carotid endarterectomy in July last year which was complicated by some type of bleeding resulting in a month long hospitalization at [place]. Since the event he has had about 2-3 hospitalizations from other complications followed by rehab stays. More recently, the patient has been doing better and living at home with his wife. Wife reports he is able to walk around the house without difficulty and mainly only needs help changing.  This morning, he woke up feeling very short of breath so they called EMS and he was brought to [facility].    Hospital course: Patient was placed on BiPAP at OSH w/ ABG showing PaO2 80 on CPAP 100%.  Given Duoneb, lasix 60mg IV, solumedrol 125mg  Wife reports he denied any F/C, CP, N/V, abd pain, cough, headache or dizziness.  Patient has chronic LE edema since his prior hospitalizations. Wife also reports a large amount of urine come out when they place a fernández in the ED. Patient had persistent bradycardia and was transferred to [facility] for possible PPM. 4/2017- stress test neg for ischemia, at that time in NSR w/ RBBB. 3/2017- echo showed normal LV function w/ moderate concentric hypertrophy. Pt presented from OSH in sustained bradycardia in 30s-40s in atrial flutter with junctional escape rhythm and periods of intermittent runs of polymorphic VT despite electrolytes wnl. Pt was admitted to the CCU. He was short of breath, started on lasix gtt, solumedrol, nitro gtt. He was intubated for airway protection. Pt was treated with 5d course of vanc and zosyn transitioned to Augmentin for possible aspiration pneumonia. He had a PPM placed 3/15 with rate set to 80 bpm. He had uptrending CE attributed to NSTEMI type II, was CP free during the admission. TTE 3/14 showed grossly normal LV systolic fxn, mild pulmonary HTN. LHC/RHC 3/15 showed coronary circulation is right dominant, LAD no flow limiting lesions, proximal circumflex tubular 85 % stenosis, proximal  % chronic total occlusion, no intervention was performed. Angioplasty of the prox Cx lesion was postponed until pt's appropriate anticoagulation was decided. GI consulted for evaluation for bleeding risk in anticipation of possible watchman and possible DAPT if pt undergoes angioplasty for LAD lesion, who decided no further endoscopy this admission and no CI to longterm DAPT and coumadin. To work up hypoxia, CT chest showed moderate loculated pleural effusion. Pulm was consulted R pleurocentesis 3/19 aspirated 600cc serous fluid. Pt underwent another LHC  on 3/21 s/p PCI FARRAH x 1 to pLCx 80% via RFA access. Watchman / RADHA? ____________________. Pt was medically optimized and discharged on ASA/plavix, high intensity statin, and coumadin? 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   Wife at bedside provided the history. Patient was very independent and in a good state of health until he had the carotid endarterectomy in July last year which was complicated by some type of bleeding resulting in a month long hospitalization at [place]. Since the event he has had about 2-3 hospitalizations from other complications followed by rehab stays. More recently, the patient has been doing better and living at home with his wife. Wife reports he is able to walk around the house without difficulty and mainly only needs help changing.  This morning, he woke up feeling very short of breath so they called EMS and he was brought to [facility].    Hospital course: Patient was placed on BiPAP at OSH w/ ABG showing PaO2 80 on CPAP 100%.  Given Duoneb, lasix 60mg IV, solumedrol 125mg  Wife reports he denied any F/C, CP, N/V, abd pain, cough, headache or dizziness.  Patient has chronic LE edema since his prior hospitalizations. Wife also reports a large amount of urine come out when they place a fernández in the ED. Patient had persistent bradycardia and was transferred to [facility] for possible PPM. 4/2017- stress test neg for ischemia, at that time in NSR w/ RBBB. 3/2017- echo showed normal LV function w/ moderate concentric hypertrophy. Pt presented from OSH in sustained bradycardia in 30s-40s in atrial flutter with junctional escape rhythm and periods of intermittent runs of polymorphic VT despite electrolytes wnl. Pt was admitted to the CCU. He was short of breath, started on lasix gtt, solumedrol, nitro gtt. He was intubated for airway protection. Pt was treated with 5d course of vanc and zosyn transitioned to Augmentin for possible aspiration pneumonia. He had a PPM placed 3/15 with rate set to 80 bpm. He had uptrending CE attributed to NSTEMI type II, was CP free during the admission. TTE 3/14 showed grossly normal LV systolic fxn, mild pulmonary HTN. LHC/RHC 3/15 showed coronary circulation is right dominant, LAD no flow limiting lesions, proximal circumflex tubular 85 % stenosis, proximal  % chronic total occlusion, no intervention was performed. Angioplasty of the prox Cx lesion was postponed until pt's appropriate anticoagulation was decided. GI consulted for evaluation for bleeding risk in anticipation of possible watchman and possible DAPT if pt undergoes angioplasty for LAD lesion, who decided no further endoscopy this admission and no CI to longterm DAPT and coumadin. To work up hypoxia, CT chest showed moderate loculated pleural effusion. Pulm was consulted R pleurocentesis 3/19 aspirated 600cc serous fluid. Pt underwent another LHC  on 3/21 s/p PCI FARRAH x 1 to pLCx 80% via RFA access. Watchman / RADHA? ____________________. Pt was medically optimized and discharged on ASA/plavix, high intensity statin. 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 Wife at bedside provided the history. Patient was very independent and in a good state of health until he had the carotid endarterectomy in July 2017 which was complicated by some type of bleeding resulting in a month long hospitalization at [place]. Since the event he has had about 2-3 hospitalizations from other complications followed by rehab stays. More recently, the patient has been doing better and living at home with his wife. Wife reports he is able to walk around the house without difficulty and mainly only needs help changing.  This morning, he woke up feeling very short of breath so they called EMS and he was brought to [facility].    Hospital course: Patient was placed on BiPAP at OSH w/ ABG showing PaO2 80 on CPAP 100%.  Given Duoneb, lasix 60mg IV, solumedrol 125mg.  Wife reports he denied any F/C, CP, N/V, abd pain, cough, headache or dizziness.  Patient has chronic LE edema since his prior hospitalizations. Wife also reports a large amount of urine come out when they place a fernández in the ED. Patient had persistent bradycardia and was transferred to [facility] for possible PPM. 4/2017- stress test neg for ischemia, at that time in NSR w/ RBBB. 3/2017- echo showed normal LV function w/ moderate concentric hypertrophy. Pt presented from OSH in sustained bradycardia in 30s-40s in atrial flutter with junctional escape rhythm and periods of intermittent runs of polymorphic VT despite electrolytes wnl. Pt was admitted to the CCU. He was short of breath, started on lasix gtt, solumedrol, nitro gtt. He was intubated for airway protection. Pt was treated with 5d course of vanc and zosyn transitioned to Augmentin for possible aspiration pneumonia. He had a Medtronic dual-chamber ICD with PPM placed 3/15. He had uptrending CE attributed to NSTEMI type II, was CP free during the admission. TTE 3/14 showed grossly normal LV systolic fxn, mild pulmonary HTN. LHC/RHC 3/15 showed coronary circulation is right dominant, LAD no flow limiting lesions, proximal circumflex tubular 85 % stenosis, proximal  % chronic total occlusion, no intervention was performed. Angioplasty of the prox Cx lesion was postponed until pt's appropriate anticoagulation was decided. GI consulted for evaluation for bleeding risk in anticipation of possible watchman and possible DAPT if pt undergoes angioplasty for LAD lesion, who decided no further endoscopy this admission and no CI to longterm DAPT and coumadin, PPI was increased to BID. To work up hypoxia, CT chest showed moderate R loculated pleural effusion, 1 cm lobulated nodule is noted in the apical segment of the left upper lobe with unclear etiology. Pulm was consulted R thoracentesis 3/19 aspirated 600cc serous fluid that met exudative criteria. Pt underwent another LHC  on 3/21 s/p PCI FARRAH x 1 to pLCx 80% via RFA access. RADHA 3/22 showed mod MR, mild-TR, mild pulmonary HTN, no apparent LA thrombus. Discussed with pt's general cardiologist that he is being discharged not on Coumadin who will decide whether to initiate it as outpatient. Pt to complete Watchman evaluation as outpt. He was discharged on lasix 40mg po daily with BP 120s-130s, BUN/Cr of  30/1.3. Pt was medically optimized and discharged on ASA/plavix, high intensity statin. 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 Wife at bedside provided the history. Patient was very independent and in a good state of health until he had the carotid endarterectomy in July 2017 which was complicated by some type of bleeding resulting in a month long hospitalization at [place]. Since the event he has had about 2-3 hospitalizations from other complications followed by rehab stays. More recently, the patient has been doing better and living at home with his wife. Wife reports he is able to walk around the house without difficulty and mainly only needs help changing.  This morning, he woke up feeling very short of breath so they called EMS and he was brought to [facility].    Hospital course: Patient was placed on BiPAP at OSH w/ ABG showing PaO2 80 on CPAP 100%.  Given Duoneb, lasix 60mg IV, solumedrol 125mg.  Wife reports he denied any F/C, CP, N/V, abd pain, cough, headache or dizziness.  Patient has chronic LE edema since his prior hospitalizations. Wife also reports a large amount of urine come out when they place a fernández in the ED. Patient had persistent bradycardia and was transferred to [facility] for possible PPM. 4/2017- stress test neg for ischemia, at that time in NSR w/ RBBB. 3/2017- echo showed normal LV function w/ moderate concentric hypertrophy. Pt presented from OSH in sustained bradycardia in 30s-40s in atrial flutter with junctional escape rhythm and periods of intermittent runs of polymorphic VT despite electrolytes wnl. Pt was admitted to the CCU. He was short of breath, started on lasix gtt, solumedrol, nitro gtt. He was intubated for airway protection. Pt was treated with 5d course of vanc and zosyn transitioned to Augmentin for possible aspiration pneumonia. He had a Medtronic dual-chamber ICD with PPM placed 3/15. He had uptrending CE attributed to NSTEMI type II, was CP free during the admission. TTE 3/14 showed grossly normal LV systolic fxn, mild pulmonary HTN. LHC/RHC 3/15 showed coronary circulation is right dominant, LAD no flow limiting lesions, proximal circumflex tubular 85 % stenosis, proximal  % chronic total occlusion, no intervention was performed. Angioplasty of the prox Cx lesion was postponed until pt's appropriate anticoagulation was decided. GI consulted for evaluation for bleeding risk in anticipation of possible watchman and possible DAPT if pt undergoes angioplasty for LAD lesion, who decided no further endoscopy this admission and no CI to longterm DAPT and coumadin, PPI was increased to BID. To work up hypoxia, CT chest showed moderate R loculated pleural effusion, 1 cm lobulated nodule is noted in the apical segment of the left upper lobe with unclear etiology. Pulm was consulted R thoracentesis 3/19 aspirated 600cc serous fluid that met exudative criteria. Pt underwent another LHC  on 3/21 s/p PCI FARRAH x 1 to pLCx 80% via RFA access. RADHA 3/22 showed mod MR, mild-TR, mild pulmonary HTN, no apparent LA thrombus. Pt and wife wanted to leave 3/23 and not stay in the hospital to dose coumadin. Discussed with pt's general cardiologist that he is being discharged not on Coumadin who will decide whether to initiate it as outpatient. Pt to complete Watchman evaluation as outpt. He was discharged on lasix 40mg po daily with BP 120s-130s, BUN/Cr of  30/1.3. Pt was medically optimized and discharged on ASA/plavix, high intensity statin. 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 Wife at bedside provided the history. Patient was very independent and in a good state of health until he had the carotid endarterectomy in July 2017 which was complicated by some type of bleeding resulting in a month long hospitalization at [place]. Since the event he has had about 2-3 hospitalizations from other complications followed by rehab stays. More recently, the patient has been doing better and living at home with his wife. Wife reports he is able to walk around the house without difficulty and mainly only needs help changing.  This morning, he woke up feeling very short of breath so they called EMS and he was brought to [facility].    Hospital course: Patient was placed on BiPAP at OSH w/ ABG showing PaO2 80 on CPAP 100%.  Given Duoneb, lasix 60mg IV, solumedrol 125mg.  Wife reports he denied any F/C, CP, N/V, abd pain, cough, headache or dizziness.  Patient has chronic LE edema since his prior hospitalizations. Wife also reports a large amount of urine come out when they place a fernández in the ED. Patient had persistent bradycardia and was transferred to [facility] for possible PPM. 4/2017- stress test neg for ischemia, at that time in NSR w/ RBBB. 3/2017- echo showed normal LV function w/ moderate concentric hypertrophy. Pt presented from OSH in sustained bradycardia in 30s-40s in atrial flutter with junctional escape rhythm and periods of intermittent runs of polymorphic VT despite electrolytes wnl. Pt was admitted to the CCU. He was short of breath, started on lasix gtt, solumedrol, nitro gtt. He was intubated for airway protection. Pt was treated with 5d course of vanc and zosyn transitioned to Augmentin for possible aspiration pneumonia. He had a Medtronic dual-chamber ICD with PPM placed 3/15. He had uptrending CE attributed to NSTEMI type II, was CP free during the admission. TTE 3/14 showed grossly normal LV systolic fxn, mild pulmonary HTN. LHC/RHC 3/15 showed coronary circulation is right dominant, LAD no flow limiting lesions, proximal circumflex tubular 85 % stenosis, proximal  % chronic total occlusion, no intervention was performed. Angioplasty of the prox Cx lesion was postponed until pt's appropriate anticoagulation was decided. GI consulted for evaluation for bleeding risk in anticipation of possible watchman and possible DAPT if pt undergoes angioplasty for LAD lesion, who decided no further endoscopy this admission and no CI to longterm DAPT and coumadin, PPI was increased to BID. To work up hypoxia, CT chest showed moderate R loculated pleural effusion, 1 cm lobulated nodule is noted in the apical segment of the left upper lobe with unclear etiology. Pulm was consulted R thoracentesis 3/19 aspirated 600cc serous fluid that met exudative criteria. Pt underwent another LHC  on 3/21 s/p PCI FARRAH x 1 to pLCx 80% via RFA access. RADHA 3/22 showed mod MR, mild-TR, mild pulmonary HTN, no apparent LA thrombus. Pt and wife wanted to leave 3/23 and not stay in the hospital to dose coumadin. Discussed risks of leaving with [place]  #[id number], and pt and wife verbalized their understanding of the risks of leaving without anticoagulation such as increased risk of stroke or death. EP to finish Watchman eval as outpatient. Discussed with pt's general cardiologist, Dr. Cabrera, that he is being discharged not on Coumadin who will decide whether to initiate it as outpatient. Pt to complete Watchman evaluation as outpt. He was discharged on lasix 40mg po daily with BP 120s-130s, BUN/Cr of  30/1.3. Pt was medically optimized and discharged on ASA/plavix, high intensity statin. 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 Wife at bedside provided the history. Patient was very independent and in a good state of health until he had the carotid endarterectomy in July 2017 which was complicated by some type of bleeding resulting in a month long hospitalization at [place]. Since the event he has had about 2-3 hospitalizations from other complications followed by rehab stays. More recently, the patient has been doing better and living at home with his wife. Wife reports he is able to walk around the house without difficulty and mainly only needs help changing.  This morning, he woke up feeling very short of breath so they called EMS and he was brought to [facility].    Hospital course: Patient was placed on BiPAP at OSH w/ ABG showing PaO2 80 on CPAP 100%.  Given Duoneb, lasix 60mg IV, solumedrol 125mg.  Wife reports he denied any F/C, CP, N/V, abd pain, cough, headache or dizziness.  Patient has chronic LE edema since his prior hospitalizations. Wife also reports a large amount of urine come out when they place a fernández in the ED. Patient had persistent bradycardia and was transferred to [facility] for possible PPM. 4/2017- stress test neg for ischemia, at that time in NSR w/ RBBB. 3/2017- echo showed normal LV function w/ moderate concentric hypertrophy. Pt presented from OSH in sustained bradycardia in 30s-40s in atrial flutter with junctional escape rhythm and periods of intermittent runs of polymorphic VT despite electrolytes wnl. Pt was admitted to the CCU. He was short of breath, started on lasix gtt, solumedrol, nitro gtt. He was intubated for airway protection. Pt was treated with 5d course of vanc and zosyn transitioned to Augmentin for possible aspiration pneumonia. He had a Medtronic dual-chamber ICD with PPM placed 3/15. He had uptrending CE attributed to NSTEMI type II, was CP free during the admission. TTE 3/14 showed grossly normal LV systolic fxn, mild pulmonary HTN. LHC/RHC 3/15 showed coronary circulation is right dominant, LAD no flow limiting lesions, proximal circumflex tubular 85 % stenosis, proximal  % chronic total occlusion, no intervention was performed. Angioplasty of the prox Cx lesion was postponed until pt's appropriate anticoagulation was decided. GI consulted for evaluation for bleeding risk in anticipation of possible watchman and possible DAPT if pt undergoes angioplasty for LAD lesion, who decided no further endoscopy this admission and no CI to longterm DAPT and coumadin, PPI was increased to BID. To work up hypoxia, CT chest showed moderate R loculated pleural effusion, 1 cm lobulated nodule is noted in the apical segment of the left upper lobe with unclear etiology. Pulm was consulted R thoracentesis 3/19 aspirated 600cc serous fluid that met exudative criteria. Pt underwent another LHC  on 3/21 s/p PCI FARRAH x 1 to pLCx 80% via RFA access. RADHA 3/22 showed mod MR, mild-TR, mild pulmonary HTN, no apparent LA thrombus. Pt and wife wanted to leave 3/23 and not stay in the hospital to dose coumadin. Discussed risks of leaving with [place]  #[id number], and pt and wife verbalized their understanding of the risks of leaving without anticoagulation such as increased risk of stroke or death. EP to finish Watchman eval as outpatient. Discussed with pt's general cardiologist, Dr. Cabrera, that he is being discharged not on Coumadin who will decide whether to initiate it as outpatient. Pt to complete Watchman evaluation as outpt. He was discharged on lasix 40mg po daily with BP 120s-130s, BUN/Cr of  30/1.3. Pt was medically optimized and discharged on ASA/plavix, high intensity statin.  EP recommended anticoagulation therapy based on HIGH CHADSVASC score .  Patient and wife refused to start anticoagulation until they discuss with their outpatient PCP and Cardiologist.  The medical risk of their refusal was explained to them and they understand.  Patient will f/up with PCP and Cardiologist as well as EP.  D/C time 60 mns.

## 2018-03-19 NOTE — DISCHARGE NOTE ADULT - CARE PLAN
Principal Discharge DX:	Complete heart block  Goal:	Manage  Assessment and plan of treatment:	You presented to [facility] in a heart rhythm where the two parts of the heart were not pumping synchronously. This can be dangerous. You received a pacemaker to make sure your heart pumps properly. See your cardiologist within 1-2 weeks of discharge for further management and treatment.  Secondary Diagnosis:	Coronary artery disease involving native coronary artery, angina presence unspecified, unspecified whether native or transplanted heart  Assessment and plan of treatment:	You had a heart catheter in the hospital on 3/15 which showed a blockage in one of the heart arteries. A  Secondary Diagnosis:	Acute decompensated heart failure  Secondary Diagnosis:	Lung nodule  Secondary Diagnosis:	Aspiration pneumonia due to regurgitated food, unspecified laterality, unspecified part of lung  Secondary Diagnosis:	Polymorphic ventricular tachycardia  Secondary Diagnosis:	Duodenal ulcer Principal Discharge DX:	Complete heart block  Goal:	Manage  Assessment and plan of treatment:	You presented to [facility] in a heart rhythm where the two parts of the heart were not pumping synchronously. This can be dangerous. You received a pacemaker to make sure your heart pumps properly. See your cardiologist within 1-2 weeks of discharge for further management and treatment.  Secondary Diagnosis:	Coronary artery disease involving native coronary artery, angina presence unspecified, unspecified whether native or transplanted heart  Assessment and plan of treatment:	You had a heart catheter in the hospital on 3/15 which showed a blockage in one of the heart arteries. Intervention was postponed until it was decided whether it would be safe for you to stay on blood thinners for longterm. See your cardiologist within 1-2 weeks of discharge for further management and treatment.  Secondary Diagnosis:	Acute decompensated heart failure  Assessment and plan of treatment:	You had trouble breathing which was thought to be due to the heart having trouble keeping up with the fluid in your body, causing it to go to the lungs. You were giving diuretics which helped you breathe. See your cardiologist within 1-2 weeks of discharge for further management and treatment.  Secondary Diagnosis:	Lung nodule  Assessment and plan of treatment:	You had a cat scan of the chest which found a 1cm nodule in your left upper lung. You need to followup with the pulmonologist within 1-2 weeks of discharge for pulmonary function tests. You need a repeat cat scan of the chest in 3 months to see if this nodule grows or is stable. With your long smoking history it is possible this nodule is cancer or it could be benign.  Secondary Diagnosis:	Aspiration pneumonia due to regurgitated food, unspecified laterality, unspecified part of lung  Assessment and plan of treatment:	You were lethargic which made you need to be intubated to make sure you breathed properly. It is possible you aspirated on your oral secretions. You were treated with a short course of antibiotics for possible pneumonia. See your primary care doctor within 1-2 weeks of discharge for further management and treatment.  Secondary Diagnosis:	Polymorphic ventricular tachycardia  Assessment and plan of treatment:	You had a dangerous heart rate called polymorphic ventricular tachycardia. You had a pacemaker placed on 3/15/2018 in order to protect you from this dangerous heart rate. See your cardiologist within 1-2 weeks of discharge for further management and treatment.  Secondary Diagnosis:	Duodenal ulcer  Assessment and plan of treatment:	You previously had low blood levels. The gastroenterologist at the outside hospital performed an endoscopy and found an ulcer in your duodenum small intestine that was not bleeding. This is a possible source of bleeding and low blood counts. The gastroenterologist doctors spoke with you this hospitalization and did not think you needed to have another endoscopy. Principal Discharge DX:	Complete heart block  Goal:	Manage  Assessment and plan of treatment:	You presented to [facility] in a heart rhythm where the two parts of the heart were not pumping synchronously. This can be dangerous. You received a Medtronic implantable cardioverter defibrillator (ICD) with pacemaker on 3/15 to make sure your heart pumps properly. See your cardiologist within 1-2 weeks of discharge for further management and treatment.  Secondary Diagnosis:	Coronary artery disease involving native coronary artery, angina presence unspecified, unspecified whether native or transplanted heart  Assessment and plan of treatment:	You had a heart catheter in the hospital on 3/15 which showed a blockage in one of the heart arteries. Intervention was postponed until it was decided whether it would be safe for you to stay on blood thinners for longterm. You had another heart cath 3/21 and you got a drug-eluting stent to your circumflex artery. You need to take your aspirin and plavix daily so your stent doesn't clot closed and cause a heart attack. If you have signs of bleeding like in your stool, go to the emergency room without delay. See your cardiologist within 1-2 weeks of discharge for further management and treatment.  Secondary Diagnosis:	Acute decompensated heart failure  Assessment and plan of treatment:	You had trouble breathing which was thought to be due to the heart having trouble keeping up with the fluid in your body, causing it to go to the lungs. You were giving diuretics which helped you breathe. See your cardiologist within 1-2 weeks of discharge for further management and treatment.  Secondary Diagnosis:	Lung nodule  Assessment and plan of treatment:	You had a cat scan of the chest which found a 1cm nodule in your left upper lung. You need to followup with the pulmonologist 1 month after discharge for pulmonary function tests. You need a repeat cat scan of the chest in 2-3 months to see if this nodule grows or is stable. With your long smoking history it is possible this nodule is cancer or it could be benign.  Secondary Diagnosis:	Aspiration pneumonia due to regurgitated food, unspecified laterality, unspecified part of lung  Assessment and plan of treatment:	You were lethargic which made you need to be intubated to make sure you breathed properly. It is possible you aspirated on your oral secretions. You were treated with a short course of antibiotics for possible pneumonia. See your primary care doctor within 1-2 weeks of discharge for further management and treatment.  Secondary Diagnosis:	Polymorphic ventricular tachycardia  Assessment and plan of treatment:	You had a dangerous heart rate called polymorphic ventricular tachycardia. You had a defibrillator and pacemaker placed on 3/15/2018 in order to protect you from this dangerous heart rate. See your cardiologist within 1-2 weeks of discharge for further management and treatment.  Secondary Diagnosis:	Duodenal ulcer  Assessment and plan of treatment:	You previously had low blood levels. The gastroenterologist at the outside hospital performed an endoscopy and found an ulcer in your duodenum small intestine that was not bleeding. This is a possible source of bleeding and low blood counts. The gastroenterologist doctors spoke with you this hospitalization and did not think you needed to have another endoscopy. Principal Discharge DX:	Complete heart block  Goal:	Manage  Assessment and plan of treatment:	You presented to [facility] in a heart rhythm where the two parts of the heart were not pumping synchronously. This can be dangerous. You received a Medtronic implantable cardioverter defibrillator (ICD) with pacemaker on 3/15 to make sure your heart pumps properly. See your cardiologist within 1-2 weeks of discharge for further management and treatment.  Secondary Diagnosis:	Coronary artery disease involving native coronary artery, angina presence unspecified, unspecified whether native or transplanted heart  Assessment and plan of treatment:	You had a heart catheterization in the hospital on 3/15 which showed a blockage in one of the heart arteries. Intervention was postponed until it was decided whether it would be safe for you to stay on blood thinners for longterm. You had another heart cath 3/21 and you got a drug-eluting stent to your circumflex artery. You need to take your aspirin and plavix daily so your stent doesn't close and cause a heart attack. If you have signs of bleeding like in your stool, go to the emergency room without delay. See your cardiologist within 1-2 weeks of discharge for further management and treatment.  Secondary Diagnosis:	Acute decompensated heart failure  Assessment and plan of treatment:	You had trouble breathing which was thought to be due to the heart having trouble keeping up with the fluid in your body, causing it to go to the lungs. You were giving diuretics which helped you breathe. See your cardiologist within 1-2 weeks of discharge for further management and treatment.  Secondary Diagnosis:	Lung nodule  Assessment and plan of treatment:	You had a cat scan of the chest which found a 1cm nodule in your left upper lung. You need to followup with the pulmonologist 1 month after discharge for pulmonary function tests. You need a repeat cat scan of the chest in 2-3 months to see if this nodule grows or is stable. With your long smoking history it is possible this nodule is cancer or it could be benign.  Secondary Diagnosis:	Aspiration pneumonia due to regurgitated food, unspecified laterality, unspecified part of lung  Assessment and plan of treatment:	You were lethargic which made you need to be intubated to make sure you breathed properly. It is possible you aspirated on your oral secretions. You were treated with a short course of antibiotics for possible pneumonia. See your primary care doctor within 1-2 weeks of discharge for further management and treatment.  Secondary Diagnosis:	Duodenal ulcer  Assessment and plan of treatment:	You previously had low blood levels. The gastroenterologist at the outside hospital performed an endoscopy and found an ulcer in your duodenum small intestine that was not bleeding. This is a possible source of bleeding and low blood counts. The gastroenterologist doctors spoke with you this hospitalization and did not think you needed to have another endoscopy.  Secondary Diagnosis:	Pleural effusion  Assessment and plan of treatment:	You had fluid in your lungs which was removed during this hospitalization which seem to help improve your breathing. It is very important you follow up in the Pulmonary clinic for further results of this fluid. You will need repeat imaging in 2-3 months to assess your lungs. Principal Discharge DX:	Complete heart block  Goal:	Manage  Assessment and plan of treatment:	You presented to [facility] in a heart rhythm where the two parts of the heart were not pumping synchronously. This can be dangerous. You received a Medtronic implantable cardioverter defibrillator (ICD) with pacemaker on 3/15 to make sure your heart pumps properly. See your heart rhythm doctor (Dr. Coker) within 1 week of discharge for further management and treatment.  Secondary Diagnosis:	Coronary artery disease involving native coronary artery, angina presence unspecified, unspecified whether native or transplanted heart  Assessment and plan of treatment:	You had a heart catheterization in the hospital on 3/15 which showed a blockage in one of the heart arteries. Intervention was postponed until it was decided whether it would be safe for you to stay on blood thinners for longterm. You had another heart cath 3/21 and you got a drug-eluting stent to your circumflex artery. You need to take your aspirin and plavix daily so your stent doesn't close and cause a heart attack. If you have signs of bleeding like in your stool, go to the emergency room without delay. See your cardiologist (Dr. Cabrera) within 1 week of discharge for further management and treatment.  Secondary Diagnosis:	Acute decompensated heart failure  Assessment and plan of treatment:	You had trouble breathing which was thought to be due to the heart having trouble keeping up with the fluid in your body, causing it to go to the lungs. You were giving diuretics which helped you breathe. See your cardiologist (Dr. Cabrera) within 1 week of discharge for further management and treatment.  Secondary Diagnosis:	Lung nodule  Assessment and plan of treatment:	You had a cat scan of the chest which found a 1cm nodule in your left upper lung. You need to followup with the pulmonologist 1 month after discharge for pulmonary function tests. You need a repeat cat scan of the chest in 2-3 months to see if this nodule grows or is stable. With your long smoking history it is possible this nodule is cancer or it could be benign.  Secondary Diagnosis:	Aspiration pneumonia due to regurgitated food, unspecified laterality, unspecified part of lung  Assessment and plan of treatment:	You were lethargic which made you need to be intubated to make sure you breathed properly. It is possible you aspirated on your oral secretions. You were treated with a short course of antibiotics for possible pneumonia. See your primary care doctor within 1-2 weeks of discharge for further management and treatment.  Secondary Diagnosis:	Pleural effusion  Assessment and plan of treatment:	You had fluid in your lungs which was removed during this hospitalization which seem to help improve your breathing. It is very important you follow up in the Pulmonary clinic for further results of this fluid. You will need repeat imaging in 2-3 months to assess your lungs.  Secondary Diagnosis:	Atrial flutter, unspecified type  Assessment and plan of treatment:	You have been in the irregular heart rhythm atrial fibrillation/atrial flutter which increases your risk of stroke. The heart rhythm doctor recommended that you start a blood thinner (anticoagulation) in the hospital, but you preferred to go home and speak to Dr. Muller and Dr. Cabrera. You did not want to stay in the hospital to initiate the blood thinner coumadin. We offered to make you an appointment with Dr. Cabrera, but you said you preferred to call yourself to make an appointment for next week after discharge. The other heart rhythm doctor will evaluate you as an outpatient at clinic whether to insert a Watchman in the heart which decreases the risk of stroke. Principal Discharge DX:	Complete heart block  Goal:	Manage  Assessment and plan of treatment:	You presented to [facility] in a heart rhythm where the two parts of the heart were not pumping synchronously. This can be dangerous. You received a Medtronic implantable cardioverter defibrillator (ICD) with pacemaker on 3/15 to make sure your heart pumps properly. See your heart rhythm doctor (Dr. Coker) within 1 week of discharge for further management and treatment.  Secondary Diagnosis:	Coronary artery disease involving native coronary artery, angina presence unspecified, unspecified whether native or transplanted heart  Assessment and plan of treatment:	You had a heart catheterization in the hospital on 3/15 which showed a blockage in one of the heart arteries. Intervention was postponed until it was decided whether it would be safe for you to stay on blood thinners for longterm. You had another heart cath 3/21 and you got a drug-eluting stent to your circumflex artery. You need to take your aspirin and plavix daily so your stent doesn't close and cause a heart attack. If you have signs of bleeding like in your stool, go to the emergency room without delay. See your cardiologist (Dr. Cabrera) within 1 week of discharge for further management and treatment.  Secondary Diagnosis:	Acute decompensated heart failure  Assessment and plan of treatment:	You had trouble breathing which was thought to be due to the heart having trouble keeping up with the fluid in your body, causing it to go to the lungs. You were giving diuretics which helped you breathe. See your cardiologist (Dr. Cabrera) within 1 week of discharge for further management and treatment.  Secondary Diagnosis:	Lung nodule  Assessment and plan of treatment:	You had a cat scan of the chest which found a 1cm nodule in your left upper lung. You need to followup with the pulmonologist 1 month after discharge for pulmonary function tests. You need a repeat cat scan of the chest in 2-3 months to see if this nodule grows or is stable. With your long smoking history it is possible this nodule is cancer or it could be benign.  Secondary Diagnosis:	Aspiration pneumonia due to regurgitated food, unspecified laterality, unspecified part of lung  Assessment and plan of treatment:	You were lethargic which made you need to be intubated to make sure you breathed properly. It is possible you aspirated on your oral secretions. You were treated with a short course of antibiotics for possible pneumonia. See your primary care doctor within 1-2 weeks of discharge for further management and treatment.    History of duodenal ulcer: You previously had low blood levels. The gastroenterologist at the outside hospital performed an endoscopy and found an ulcer in your duodenum small intestine that was not bleeding. This is a possible source of bleeding and low blood counts. The gastroenterologist doctors spoke with you this hospitalization and did not think you needed to have another endoscopy. They do not think your previous bleeding from the intestine should prevent you from taking aspirin, plavix, and coumadin longterm.  Secondary Diagnosis:	Pleural effusion  Assessment and plan of treatment:	You had fluid in your lungs which was removed during this hospitalization which seem to help improve your breathing. It is very important you follow up in the Pulmonary clinic for further results of this fluid. You will need repeat imaging in 2-3 months to assess your lungs.  Secondary Diagnosis:	Atrial flutter, unspecified type  Assessment and plan of treatment:	You have been in the irregular heart rhythm atrial fibrillation/atrial flutter which increases your risk of stroke. The heart rhythm doctor recommended that you start a blood thinner (anticoagulation) in the hospital, but you preferred to go home and speak to Dr. Muller and Dr. Cabrera. You did not want to stay in the hospital to initiate the blood thinner coumadin. We offered to make you an appointment with Dr. Cabrera, but you said you preferred to call yourself to make an appointment for next week after discharge. The other heart rhythm doctor will evaluate you as an outpatient at clinic whether to insert a Watchman in the heart which decreases the risk of stroke.

## 2018-03-19 NOTE — PROGRESS NOTE ADULT - PROBLEM SELECTOR PLAN 4
- Rate well controlled AV paced 60s-90s  - Await GI recs regarding whether anticoagulation or need for further scoping  -Per OSH endoscopy and colonoscopy reports show patient initially presented with Hg 5.4 requiring urgent EGD at previous hospitalization which was notable for hiatal hernia, duodenal ulcer and duodenitis. Colonoscopy was also performed at that time showing 8 mm sessile polyp. (Ie procedure preceded bleeding event rather than as a complication).   - No plan for Watchman procedure for now per EP until patient stabilized - Rate well controlled AV paced 60s-90s  - Per GI, no scope this admission. f/u regarding DAPT or coumadin in addition to plavix  -House EP consulted whether watchman  -Per OSH endoscopy and colonoscopy reports show patient initially presented with Hg 5.4 requiring urgent EGD at previous hospitalization which was notable for hiatal hernia, duodenal ulcer and duodenitis. Colonoscopy was also performed at that time showing 8 mm sessile polyp. (Ie procedure preceded bleeding event rather than as a complication).

## 2018-03-19 NOTE — PROGRESS NOTE ADULT - SUBJECTIVE AND OBJECTIVE BOX
INTERVAL HPI/OVERNIGHT EVENTS: patient feels well. No chest pain No shortness of breath. Remains in sustained AFIB.     MEDICATIONS  (STANDING):  artificial  tears Solution 1 Drop(s) Both EYES three times a day  ascorbic acid 500 milliGRAM(s) Oral daily  atorvastatin 40 milliGRAM(s) Oral at bedtime  BACItracin   Ophthalmic Ointment 1 Application(s) Right EYE daily  brimonidine 0.2% Ophthalmic Solution 1 Drop(s) Right EYE two times a day  carvedilol 6.25 milliGRAM(s) Oral every 12 hours  dextrose 5%. 1000 milliLiter(s) (50 mL/Hr) IV Continuous <Continuous>  dextrose 50% Injectable 12.5 Gram(s) IV Push once  dextrose 50% Injectable 25 Gram(s) IV Push once  dextrose 50% Injectable 25 Gram(s) IV Push once  docusate sodium 100 milliGRAM(s) Oral two times a day  dorzolamide 2%/timolol 0.5% Ophthalmic Solution 1 Drop(s) Both EYES two times a day  ferrous    sulfate 325 milliGRAM(s) Oral daily  furosemide   Injectable 40 milliGRAM(s) IV Push two times a day  heparin  Injectable 5000 Unit(s) SubCutaneous every 12 hours  insulin lispro (HumaLOG) corrective regimen sliding scale   SubCutaneous at bedtime  insulin lispro (HumaLOG) corrective regimen sliding scale   SubCutaneous three times a day before meals  loteprednol 0.5% Ophthalmic Suspension 1 Drop(s) Both EYES two times a day  pantoprazole    Tablet 40 milliGRAM(s) Oral before breakfast  piperacillin/tazobactam IVPB. 3.375 Gram(s) IV Intermittent every 8 hours  polyethylene glycol 3350 17 Gram(s) Oral daily  senna 2 Tablet(s) Oral at bedtime  sodium chloride 0.9%. 1000 milliLiter(s) (50 mL/Hr) IV Continuous <Continuous>  tamsulosin 0.4 milliGRAM(s) Oral daily  vancomycin  IVPB 500 milliGRAM(s) IV Intermittent every 24 hours    MEDICATIONS  (PRN):  acetaminophen   Tablet. 650 milliGRAM(s) Oral every 6 hours PRN Mild and/or moderate and/or severe pain  ALBUTerol/ipratropium for Nebulization 3 milliLiter(s) Nebulizer every 6 hours PRN Shortness of Breath and/or Wheezing  dextrose Gel 1 Dose(s) Oral once PRN Blood Glucose LESS THAN 70 milliGRAM(s)/deciliter  glucagon  Injectable 1 milliGRAM(s) IntraMuscular once PRN Glucose LESS THAN 70 milligrams/deciliter      Allergies    No Known Allergies    Intolerances      ROS:  General: Pt denies recent weight loss/fever/chills    Neurological: denies numbness or  sensation loss    Cardiovascular: denies chest pain/palpitations/leg edema    Respiratory and Thorax: denies SOB/cough/wheezing    Gastrointestinal: denies abdominal pain/diarrhea/constipation/bloody stool    Genitourinary: denies urinary frequency/urgency/ dysuria    Musculoskeletal: denies joint pain or swelling, denies restricted motion    Hematologic: denies abnormal bleeding  	    	  	    		        	    	            Vital Signs Last 24 Hrs  T(C): 36.8 (19 Mar 2018 04:46), Max: 36.9 (18 Mar 2018 20:09)  T(F): 98.2 (19 Mar 2018 04:46), Max: 98.4 (18 Mar 2018 20:09)  HR: 60 (19 Mar 2018 04:46) (60 - 62)  BP: 131/61 (19 Mar 2018 04:46) (131/61 - 162/68)  BP(mean): --  RR: 18 (19 Mar 2018 04:46) (17 - 18)  SpO2: 93% (19 Mar 2018 04:46) (93% - 96%)  Daily     Daily Weight in k.7 (19 Mar 2018 08:44)     @ 07:  -   @ 07:00  --------------------------------------------------------  IN: 485 mL / OUT: 2800 mL / NET: -2315 mL     @ 07:01  -   @ 09:12  --------------------------------------------------------  IN: 320 mL / OUT: 1200 mL / NET: -880 mL      Physical Exam:      LABS:                        10.7   13.30 )-----------( 371      ( 18 Mar 2018 08:38 )             35.1     03-18    141  |  102  |  51<H>  ----------------------------<  207<H>  3.7   |  27  |  1.60<H>    Ca    8.7      18 Mar 2018 05:58  Phos  3.5     -18  Mg     2.3     18            RADIOLOGY & ADDITIONAL TESTS:    TELE:    EKG:

## 2018-03-19 NOTE — PROGRESS NOTE ADULT - PROBLEM SELECTOR PLAN 1
- BNP 5000s showing improvement  - Continue lasix 40 po daily  - Strict I/Os and daily weights. Net even today  - Continue PRN duonebs  - Patient is currently on room air, with mild R pleural effusion, may be 2/2 HF but less likely if only R sided. Pulmonary consulted. - BNP 5000s showing improvement  - Continue lasix 40 po daily  - Strict I/Os and daily weights. Net even today  - Continue PRN duonebs  - Patient is currently on room air, with moderate R loculated pleural effusion, may be 2/2 HF but less likely if only R sided. Pulmonary consulted.

## 2018-03-19 NOTE — PROGRESS NOTE ADULT - PROBLEM SELECTOR PLAN 3
Moderate loculated pleural effusion  -Pulm consulted. Per pulm if a reasonable pocket is present, will consider diagnostic / therapeutic thoracentesis. Pt is at risk for empyema  - Although patient was intubated for AICD placement, low concern for PNA, as patient is afebrile  - Mild leukocytosis, but may be multifactorial and downtrending  - Will monitor off abx, pulmonary consulted, and will f/u recommendations including possible thoracentesis Moderate loculated pleural effusion  -Pulm consulted. Per pulm will perform pleurocentesis today. Pt is at risk for empyema  - Although patient was intubated for AICD placement, low concern for PNA, as patient is afebrile  - Mild leukocytosis, but may be multifactorial and downtrending

## 2018-03-19 NOTE — PROGRESS NOTE ADULT - PROBLEM SELECTOR PLAN 2
- Patient's BP over past few days has decreased from nearly 180s SBP to 110s. No complaints from patient and patient is not orthostatic. Will monitor for now.  - Decreased Carvedilol to 6.25 BID  - Consider restarting HCTZ at 12.5 or nifedipine if requires additional blood pressure control, currently more hypotensive  - Continue to hold ARB and spironolactone in setting of PAULIE  - Will still need further medical reconciliation regarding home medications (in outpatient medication review on spironolactone, hydrochlorothiazide, valsartan and nifedipine, however valsartan-HCTZ and a few other of his medications may have been discontinued per paperwork from previous hospitalization). - Patient's BP over past few days has decreased from nearly 180s SBP to 110s. No complaints from patient and patient is not orthostatic. Will monitor for now.  - c/w Carvedilol to 6.25 BID  - Consider restarting HCTZ at 12.5 or nifedipine if requires additional blood pressure control, currently more hypotensive  - Continue to hold ARB and spironolactone in setting of PAULIE  - Will still need further medical reconciliation regarding home medications (in outpatient medication review on spironolactone, hydrochlorothiazide, valsartan and nifedipine, however valsartan-HCTZ and a few other of his medications may have been discontinued per paperwork from previous hospitalization).

## 2018-03-19 NOTE — DISCHARGE NOTE ADULT - PATIENT PORTAL LINK FT
You can access the [facility] Patient Portal, offered by [facility], by registering with the following website: [email or web address]

## 2018-03-19 NOTE — PROGRESS NOTE ADULT - SUBJECTIVE AND OBJECTIVE BOX
Patient is a 82y old  Male who presents with a chief complaint of Shortness of breath (13 Mar 2018 23:00)    Shiela Thomas, PGY1  Internal Medicine team 3  Pager [phone] / [phone]    SUBJECTIVE / OVERNIGHT EVENTS: No complaints overnight. No SOB, CP, palpitations. Breathing well on RA    MEDICATIONS  (STANDING):  artificial  tears Solution 1 Drop(s) Both EYES three times a day  ascorbic acid 500 milliGRAM(s) Oral daily  atorvastatin 40 milliGRAM(s) Oral at bedtime  BACItracin   Ophthalmic Ointment 1 Application(s) Right EYE daily  brimonidine 0.2% Ophthalmic Solution 1 Drop(s) Right EYE two times a day  carvedilol 6.25 milliGRAM(s) Oral every 12 hours  dextrose 5%. 1000 milliLiter(s) (50 mL/Hr) IV Continuous <Continuous>  dextrose 50% Injectable 12.5 Gram(s) IV Push once  dextrose 50% Injectable 25 Gram(s) IV Push once  dextrose 50% Injectable 25 Gram(s) IV Push once  docusate sodium 100 milliGRAM(s) Oral two times a day  dorzolamide 2%/timolol 0.5% Ophthalmic Solution 1 Drop(s) Both EYES two times a day  ferrous    sulfate 325 milliGRAM(s) Oral daily  furosemide    Tablet 40 milliGRAM(s) Oral daily  heparin  Injectable 5000 Unit(s) SubCutaneous every 12 hours  insulin lispro (HumaLOG) corrective regimen sliding scale   SubCutaneous at bedtime  insulin lispro (HumaLOG) corrective regimen sliding scale   SubCutaneous three times a day before meals  loteprednol 0.5% Ophthalmic Suspension 1 Drop(s) Both EYES two times a day  pantoprazole    Tablet 40 milliGRAM(s) Oral before breakfast  piperacillin/tazobactam IVPB. 3.375 Gram(s) IV Intermittent every 8 hours  polyethylene glycol 3350 17 Gram(s) Oral daily  senna 2 Tablet(s) Oral at bedtime  sodium chloride 0.9%. 1000 milliLiter(s) (50 mL/Hr) IV Continuous <Continuous>  tamsulosin 0.4 milliGRAM(s) Oral daily  vancomycin  IVPB 500 milliGRAM(s) IV Intermittent every 24 hours    MEDICATIONS  (PRN):  acetaminophen   Tablet. 650 milliGRAM(s) Oral every 6 hours PRN Mild and/or moderate and/or severe pain  ALBUTerol/ipratropium for Nebulization 3 milliLiter(s) Nebulizer every 6 hours PRN Shortness of Breath and/or Wheezing  dextrose Gel 1 Dose(s) Oral once PRN Blood Glucose LESS THAN 70 milliGRAM(s)/deciliter  glucagon  Injectable 1 milliGRAM(s) IntraMuscular once PRN Glucose LESS THAN 70 milligrams/deciliter      Vital Signs Last 24 Hrs  T(C): 36.8 (19 Mar 2018 04:46), Max: 36.9 (18 Mar 2018 20:09)  T(F): 98.2 (19 Mar 2018 04:46), Max: 98.4 (18 Mar 2018 20:09)  HR: 59 (19 Mar 2018 09:20) (59 - 62)  BP: 121/62 (19 Mar 2018 09:20) (121/62 - 162/68)  BP(mean): --  RR: 18 (19 Mar 2018 04:46) (17 - 18)  SpO2: 93% (19 Mar 2018 04:46) (93% - 96%)    CAPILLARY BLOOD GLUCOSE      POCT Blood Glucose.: 167 mg/dL (19 Mar 2018 07:25)  POCT Blood Glucose.: 199 mg/dL (18 Mar 2018 21:16)  POCT Blood Glucose.: 173 mg/dL (18 Mar 2018 16:13)  POCT Blood Glucose.: 281 mg/dL (18 Mar 2018 11:42)      I&O's Summary    18 Mar 2018 07:01  -  19 Mar 2018 07:00  --------------------------------------------------------  IN: 485 mL / OUT: 2800 mL / NET: -2315 mL    19 Mar 2018 07:01  -  19 Mar 2018 09:42  --------------------------------------------------------  IN: 320 mL / OUT: 1200 mL / NET: -880 mL      PHYSICAL EXAM:  GENERAL: NAD, well-developed  HEAD:  Atraumatic  EYES: EOMI, PERRL, decreased vision at baseline. +conjunctival injection chronic per wife  NECK: Supple, No JVD  CHEST/LUNG: faint LLL rales. No wheezes or rhonchi. Poor inspiratory effort  HEART: Regular rate and rhythm; No murmurs, rubs, or gallops  ABDOMEN: Soft, Nontender, Nondistended; Bowel sounds present  EXTREMITIES:  2+ Peripheral Pulses, No clubbing, cyanosis, or edema  NEUROLOGY: A&O x3  SKIN: No rashes or lesions    LABS:                        10.7   13.30 )-----------( 371      ( 18 Mar 2018 08:38 )             35.1     CBC Full  -  ( 18 Mar 2018 08:38 )  WBC Count : 13.30 K/uL  Hemoglobin : 10.7 g/dL  Hematocrit : 35.1 %  Platelet Count - Automated : 371 K/uL  Mean Cell Volume : 83.0 fl  Mean Cell Hemoglobin : 25.3 pg  Mean Cell Hemoglobin Concentration : 30.5 gm/dL  Auto Neutrophil # : 8.98 K/uL  Auto Lymphocyte # : 1.97 K/uL  Auto Monocyte # : 1.68 K/uL  Auto Eosinophil # : 0.59 K/uL  Auto Basophil # : 0.01 K/uL  Auto Neutrophil % : 67.6 %  Auto Lymphocyte % : 14.8 %  Auto Monocyte % : 12.6 %  Auto Eosinophil % : 4.4 %  Auto Basophil % : 0.1 %    03-19    140  |  102  |  47<H>  ----------------------------<  189<H>  3.7   |  26  |  1.41<H>    Ca    9.2      19 Mar 2018 09:08  Phos  3.2     03-19  Mg     2.2     03-19      Creatinine Trend: 1.41<--, 1.60<--, 1.41<--, 1.36<--, 1.43<--, 1.64<--      MICROBIOLOGY:      RADIOLOGY & ADDITIONAL TESTS:  < from: Limited Transthoracic Echo (03.18.18 @ 08:53) >  CONCLUSIONS:  1. Endocardium not well visualized; grossly normal left  ventricular systolic function.  2. No pericardial effusion seen.    < end of copied text >      Imaging Personally Reviewed: CT chest    Consultant(s) Notes Reviewed:  EP    Care Discussed with Consultants/Other Providers: EP Patient is a 82y old  Male who presents with a chief complaint of Shortness of breath (13 Mar 2018 23:00)    Shiela Thomas, PGY1  Internal Medicine team 3  Pager [phone] / [phone]    SUBJECTIVE / OVERNIGHT EVENTS: No complaints overnight. No SOB, CP, palpitations. Breathing well on RA    MEDICATIONS  (STANDING):  artificial  tears Solution 1 Drop(s) Both EYES three times a day  ascorbic acid 500 milliGRAM(s) Oral daily  atorvastatin 40 milliGRAM(s) Oral at bedtime  BACItracin   Ophthalmic Ointment 1 Application(s) Right EYE daily  brimonidine 0.2% Ophthalmic Solution 1 Drop(s) Right EYE two times a day  carvedilol 6.25 milliGRAM(s) Oral every 12 hours  dextrose 5%. 1000 milliLiter(s) (50 mL/Hr) IV Continuous <Continuous>  dextrose 50% Injectable 12.5 Gram(s) IV Push once  dextrose 50% Injectable 25 Gram(s) IV Push once  dextrose 50% Injectable 25 Gram(s) IV Push once  docusate sodium 100 milliGRAM(s) Oral two times a day  dorzolamide 2%/timolol 0.5% Ophthalmic Solution 1 Drop(s) Both EYES two times a day  ferrous    sulfate 325 milliGRAM(s) Oral daily  furosemide    Tablet 40 milliGRAM(s) Oral daily  heparin  Injectable 5000 Unit(s) SubCutaneous every 12 hours  insulin lispro (HumaLOG) corrective regimen sliding scale   SubCutaneous at bedtime  insulin lispro (HumaLOG) corrective regimen sliding scale   SubCutaneous three times a day before meals  loteprednol 0.5% Ophthalmic Suspension 1 Drop(s) Both EYES two times a day  pantoprazole    Tablet 40 milliGRAM(s) Oral before breakfast  piperacillin/tazobactam IVPB. 3.375 Gram(s) IV Intermittent every 8 hours  polyethylene glycol 3350 17 Gram(s) Oral daily  senna 2 Tablet(s) Oral at bedtime  sodium chloride 0.9%. 1000 milliLiter(s) (50 mL/Hr) IV Continuous <Continuous>  tamsulosin 0.4 milliGRAM(s) Oral daily  vancomycin  IVPB 500 milliGRAM(s) IV Intermittent every 24 hours    MEDICATIONS  (PRN):  acetaminophen   Tablet. 650 milliGRAM(s) Oral every 6 hours PRN Mild and/or moderate and/or severe pain  ALBUTerol/ipratropium for Nebulization 3 milliLiter(s) Nebulizer every 6 hours PRN Shortness of Breath and/or Wheezing  dextrose Gel 1 Dose(s) Oral once PRN Blood Glucose LESS THAN 70 milliGRAM(s)/deciliter  glucagon  Injectable 1 milliGRAM(s) IntraMuscular once PRN Glucose LESS THAN 70 milligrams/deciliter      Vital Signs Last 24 Hrs  T(C): 36.8 (19 Mar 2018 04:46), Max: 36.9 (18 Mar 2018 20:09)  T(F): 98.2 (19 Mar 2018 04:46), Max: 98.4 (18 Mar 2018 20:09)  HR: 59 (19 Mar 2018 09:20) (59 - 62)  BP: 121/62 (19 Mar 2018 09:20) (121/62 - 162/68)  BP(mean): --  RR: 18 (19 Mar 2018 04:46) (17 - 18)  SpO2: 93% (19 Mar 2018 04:46) (93% - 96%)    CAPILLARY BLOOD GLUCOSE      POCT Blood Glucose.: 167 mg/dL (19 Mar 2018 07:25)  POCT Blood Glucose.: 199 mg/dL (18 Mar 2018 21:16)  POCT Blood Glucose.: 173 mg/dL (18 Mar 2018 16:13)  POCT Blood Glucose.: 281 mg/dL (18 Mar 2018 11:42)      I&O's Summary    18 Mar 2018 07:01  -  19 Mar 2018 07:00  --------------------------------------------------------  IN: 485 mL / OUT: 2800 mL / NET: -2315 mL    19 Mar 2018 07:01  -  19 Mar 2018 09:42  --------------------------------------------------------  IN: 320 mL / OUT: 1200 mL / NET: -880 mL      PHYSICAL EXAM:  GENERAL: NAD, well-developed  HEAD:  Atraumatic  EYES: EOMI, PERRL, decreased vision at baseline. +conjunctival injection chronic per wife  NECK: Supple, No JVD  CHEST/LUNG: faint LLL rales. No wheezes or rhonchi. Poor inspiratory effort  HEART: Regular rate and rhythm; No murmurs, rubs, or gallops  ABDOMEN: Soft, Nontender, Nondistended; Bowel sounds present  EXTREMITIES:  2+ Peripheral Pulses, No clubbing, cyanosis, or edema  NEUROLOGY: A&O x3  SKIN: No rashes or lesions    LABS:                         11.0   10.8  )-----------( 383      ( 19 Mar 2018 09:36 )             35.4     CBC Full  -  ( 19 Mar 2018 09:36 )  WBC Count : 10.8 K/uL  Hemoglobin : 11.0 g/dL  Hematocrit : 35.4 %  Platelet Count - Automated : 383 K/uL  Mean Cell Volume : 85.2 fl  Mean Cell Hemoglobin : 26.4 pg  Mean Cell Hemoglobin Concentration : 30.9 gm/dL  Auto Neutrophil # : x  Auto Lymphocyte # : x  Auto Monocyte # : x  Auto Eosinophil # : x  Auto Basophil # : x  Auto Neutrophil % : x  Auto Lymphocyte % : x  Auto Monocyte % : x  Auto Eosinophil % : x  Auto Basophil % : x    03-19    140  |  102  |  47<H>  ----------------------------<  189<H>  3.7   |  26  |  1.41<H>    Ca    9.2      19 Mar 2018 09:08  Phos  3.2     03-19  Mg     2.2     03-19      Creatinine Trend: 1.41<--, 1.60<--, 1.41<--, 1.36<--, 1.43<--, 1.64<--      MICROBIOLOGY:      RADIOLOGY & ADDITIONAL TESTS:  < from: Limited Transthoracic Echo (03.18.18 @ 08:53) >  CONCLUSIONS:  1. Endocardium not well visualized; grossly normal left  ventricular systolic function.  2. No pericardial effusion seen.    < end of copied text >      Imaging Personally Reviewed: CT chest    Consultant(s) Notes Reviewed:  EP    Care Discussed with Consultants/Other Providers: EP

## 2018-03-19 NOTE — DISCHARGE NOTE ADULT - SECONDARY DIAGNOSIS.
Coronary artery disease involving native coronary artery, angina presence unspecified, unspecified whether native or transplanted heart Acute decompensated heart failure Lung nodule Aspiration pneumonia due to regurgitated food, unspecified laterality, unspecified part of lung Polymorphic ventricular tachycardia Duodenal ulcer Pleural effusion Atrial flutter, unspecified type

## 2018-03-19 NOTE — DISCHARGE NOTE ADULT - MEDICATION SUMMARY - MEDICATIONS TO CHANGE
I will SWITCH the dose or number of times a day I take the medications listed below when I get home from the hospital:    PriLOSEC 20 mg oral delayed release capsule  -- 1 cap(s) by mouth once a day    carvedilol 3.125 mg oral tablet  -- 1 tab(s) by mouth 2 times a day

## 2018-03-19 NOTE — DISCHARGE NOTE ADULT - PROVIDER TOKENS
FREE:[LAST:[Rick],FIRST:[Aneudy],PHONE:[(   )    -],FAX:[(   )    -],ADDRESS:[Cardiology  Address: [address] [place] [address]  Phone: [phone]]],TOKEN:'[email or web address]' TOKEN:'[email or web address]',FREE:[LAST:[Rick],FIRST:[Aneudy],PHONE:[(   )    -],FAX:[(   )    -],ADDRESS:[Cardiology  Address: [address] [place] [address]  Phone: [phone]]],FREE:[LAST:[Yohana],FIRST:[Weston],PHONE:[(   )    -],FAX:[(   )    -],ADDRESS:[Primary care doctor  [phone]]],FREE:[LAST:[Pulmonology clinic at [facility]],PHONE:[(   )    -],FAX:[(   )    -],ADDRESS:[[address] [place] [address]  [phone]]] TOKEN:'[email or web address]',FREE:[LAST:[Rick],FIRST:[Aneudy],PHONE:[(   )    -],FAX:[(   )    -],ADDRESS:[Cardiology  Address: [address] [place] [address]  Phone: [phone]]],FREE:[LAST:[Pulmonology clinic at [facility]],PHONE:[(   )    -],FAX:[(   )    -],ADDRESS:[[address] [place] [address]  [phone]]],FREE:[LAST:[Electrophysiology clinic (for atrial fibrilation) at [facility]],PHONE:[(   )    -],FAX:[(   )    -],ADDRESS:[[address] [place] [address] [address] [place] [address]  [phone]]]

## 2018-03-19 NOTE — PROGRESS NOTE ADULT - SUBJECTIVE AND OBJECTIVE BOX
Patient is a 82y old  Male who presents with a chief complaint of Shortness of breath (13 Mar 2018 23:00)      SUBJECTIVE / OVERNIGHT EVENTS:  No events. No abd pain or BM.  MEDICATIONS  (STANDING):  artificial  tears Solution 1 Drop(s) Both EYES three times a day  ascorbic acid 500 milliGRAM(s) Oral daily  atorvastatin 40 milliGRAM(s) Oral at bedtime  BACItracin   Ophthalmic Ointment 1 Application(s) Right EYE daily  brimonidine 0.2% Ophthalmic Solution 1 Drop(s) Right EYE two times a day  carvedilol 6.25 milliGRAM(s) Oral every 12 hours  dextrose 5%. 1000 milliLiter(s) (50 mL/Hr) IV Continuous <Continuous>  dextrose 50% Injectable 12.5 Gram(s) IV Push once  dextrose 50% Injectable 25 Gram(s) IV Push once  dextrose 50% Injectable 25 Gram(s) IV Push once  docusate sodium 100 milliGRAM(s) Oral two times a day  dorzolamide 2%/timolol 0.5% Ophthalmic Solution 1 Drop(s) Both EYES two times a day  ferrous    sulfate 325 milliGRAM(s) Oral daily  furosemide    Tablet 40 milliGRAM(s) Oral daily  heparin  Injectable 5000 Unit(s) SubCutaneous every 12 hours  insulin lispro (HumaLOG) corrective regimen sliding scale   SubCutaneous at bedtime  insulin lispro (HumaLOG) corrective regimen sliding scale   SubCutaneous three times a day before meals  loteprednol 0.5% Ophthalmic Suspension 1 Drop(s) Both EYES two times a day  pantoprazole    Tablet 40 milliGRAM(s) Oral before breakfast  piperacillin/tazobactam IVPB. 3.375 Gram(s) IV Intermittent every 8 hours  polyethylene glycol 3350 17 Gram(s) Oral daily  potassium chloride    Tablet ER 40 milliEquivalent(s) Oral once  senna 2 Tablet(s) Oral at bedtime  sodium chloride 0.9%. 1000 milliLiter(s) (50 mL/Hr) IV Continuous <Continuous>  tamsulosin 0.4 milliGRAM(s) Oral daily  vancomycin  IVPB 500 milliGRAM(s) IV Intermittent every 24 hours    MEDICATIONS  (PRN):  acetaminophen   Tablet. 650 milliGRAM(s) Oral every 6 hours PRN Mild and/or moderate and/or severe pain  ALBUTerol/ipratropium for Nebulization 3 milliLiter(s) Nebulizer every 6 hours PRN Shortness of Breath and/or Wheezing  dextrose Gel 1 Dose(s) Oral once PRN Blood Glucose LESS THAN 70 milliGRAM(s)/deciliter  glucagon  Injectable 1 milliGRAM(s) IntraMuscular once PRN Glucose LESS THAN 70 milligrams/deciliter              PHYSICAL EXAM:  GENERAL: NAD, well-developed  HEAD:  Atraumatic, Normocephalic  EYES: EOMI, PERRLA, conjunctiva and sclera anicteric  NECK: Supple, No JVD  CHEST/LUNG: Clear to auscultation bilaterally; No wheeze  HEART: Regular rate and rhythm; No murmurs, rubs, or gallops  ABDOMEN: Soft, Nontender, Nondistended; Bowel sounds present, no hepatosplenomegaly, no rebound or guarding  EXTREMITIES:  2+ Peripheral Pulses, No clubbing, cyanosis, or edema  PSYCH: AAOx3  NEUROLOGY: non-focal, no asterixis  SKIN: No rashes or lesion    LABS:                        11.0   10.8  )-----------( 383      ( 19 Mar 2018 09:36 )             35.4     03-19    140  |  102  |  47<H>  ----------------------------<  189<H>  3.7   |  26  |  1.41<H>    Ca    9.2      19 Mar 2018 09:08  Phos  3.2     03-19  Mg     2.2     03-19

## 2018-03-19 NOTE — DISCHARGE NOTE ADULT - MEDICATION SUMMARY - MEDICATIONS TO TAKE
I will START or STAY ON the medications listed below when I get home from the hospital:    aspirin 81 mg oral tablet, chewable  -- 1 tab(s) by mouth once a day  -- Indication: For Coronary artery disease involving native coronary artery, angina presence unspecified, unspecified whether native or transplanted heart    Flomax 0.4 mg oral capsule  -- 2 cap(s) by mouth once a day  -- Indication: For benign prostatic hypertension    glimepiride 2 mg oral tablet  -- 1 tab(s) by mouth once a day  -- Indication: For Diabetes    Vascepa  -- 2 gram(s) by mouth 2 times a day (with meals)  -- Indication: For Hypertriglyceridemia    atorvastatin 40 mg oral tablet  -- 1 tab(s) by mouth once a day (at bedtime)  -- Indication: For Coronary artery disease involving native coronary artery, angina presence unspecified, unspecified whether native or transplanted heart    Plavix 75 mg oral tablet  -- 1 tab(s) by mouth once a day  -- Indication: For Coronary artery disease involving native coronary artery, angina presence unspecified, unspecified whether native or transplanted heart    Ambien 5 mg oral tablet  -- 1 tab(s) by mouth once a day (at bedtime)  -- Indication: For Insomnia    carvedilol 6.25 mg oral tablet  -- 1 tab(s) by mouth every 12 hours  -- Indication: For Coronary artery disease involving native coronary artery, angina presence unspecified, unspecified whether native or transplanted heart    furosemide 40 mg oral tablet  -- 1 tab(s) by mouth once a day  -- Indication: For Heart failure exacerbation    ferrous sulfate 325 mg (65 mg elemental iron) oral tablet  -- 1 tab(s) by mouth 2 times a day  -- Indication: For Anemia    cilostazol 100 mg oral tablet  -- 1 tab(s) by mouth once a day  -- Indication: For Peripheral artery disease    Lotemax 0.5% ophthalmic ointment  -- 1 application to each affected eye 4 times a day  -- Indication: For Glaucoma    Systane ophthalmic gel forming solution  -- 1 drop(s) to each affected eye 2 times a day  -- Indication: For Glaucoma    dorzolamide-timolol 2%-0.5% preservative-free ophthalmic solution  -- 1 drop(s) to the right eye 2 times a day  -- Indication: For Glaucoma    Alphagan P 0.15% ophthalmic solution  -- 1 drop(s) to each affected eye 2 times a day  -- Indication: For Glaucoma    pantoprazole 40 mg oral delayed release tablet  -- 1 tab(s) by mouth 2 times a day  -- Indication: For History of duodenal ulcer    Vitamin B Complex with Folic Acid oral tablet  -- 1 tab(s) by mouth once a day  -- Indication: For Preventive health    cholecalciferol 50,000 intl units oral capsule  -- 1 cap(s) by mouth every 2 weeks  -- Indication: For Preventive health

## 2018-03-19 NOTE — PROGRESS NOTE ADULT - ASSESSMENT
82 year male with history of HTN DM hyperlipidemia, PAF, h/o Carotid disease, possible PVD, presents with 1 day h/o acute shortness of breath, with complete heart block, sustained atrial flutter, and polymorphic VT. On lasix 40 IV BID after presented with severe acute pulmonary edema, appears improved.. Creatinine improved at 1.4. No further VT, but occasional VPB noted. CAth noted 100% RCA and 85% prox circ lesion.     ICD check today shows small R waves when sensing bipolar, , but within normal limits at 12 when sensing integrated ( tip to coil) threshold stable 0.5@ 0.5 impedance stable.     echo no effusion    small lung nodule noted, needs PET CT as outpatient    needs GI evaluation regarding can patient had DAPT for possible angioplasty? can patient tolerate anticoagulation for AFIB    with obvious airway compromise  at time of ICD implant and loculated effusion, would treat for 5 days with antibiotics.

## 2018-03-19 NOTE — DISCHARGE NOTE ADULT - ADDITIONAL INSTRUCTIONS
Followup with your cardiologist, Dr. Jenni Coker, within 1-2 weeks of discharge. Followup with your electrophysiologist who placed your pacemaker, Dr. Jenni Coker, within 1-2 weeks of discharge.  Followup with your cardiologist, Dr. Aneudy Cabrera, within 1-2 weeks of discharge. Followup with your electrophysiologist who placed your defibrillator and pacemaker, Dr. Jenni Coker, within 1-2 weeks of discharge.  Followup with your cardiologist, Dr. Aneudy Cabrera, within 1-2 weeks of discharge.  Followup wit your primary care doctor, Dr. Weston Muller, DO [phone], within 1-2 weeks of discharge.  Followup with the pulmonologist 1 month after discharge for pulmonary function tests and repeat cat scan of the chest to monitor the nodule in your lung. Followup with your heart rhythm doctor (the electrophysiologist) who placed your defibrillator and pacemaker, Dr. Jenni Coker, within 1 week of discharge.  Followup with your cardiologist, Dr. Aneudy Cabrera, within 1 week of discharge to initiate coumadin for atrial fibrillation and discuss the drug-eluting stent you had placed in your heart vessel.  Followup wit your primary care doctor, Dr. Weston Muller, DO [phone], within 1-2 weeks of discharge.  Followup with the pulmonologist 1 month after discharge for pulmonary function tests and repeat cat scan of the chest to monitor the nodule in your lung.  Followup with the atrial fibrillation doctor within 2 weeks of discharge to evaluate whether a Watchman placed in the heart to decrease risk of stroke would be a good thing for you. Followup with your heart rhythm doctor (the electrophysiologist) who placed your defibrillator and pacemaker, Dr. Jenni Coker, within 1 week of discharge.  Followup with your cardiologist, Dr. Aneudy Cabrera, within 1 week of discharge to initiate coumadin for atrial fibrillation and discuss the drug-eluting stent you had placed in your heart vessel.  Followup wit your primary care doctor, Dr. Weston Muller, DO [phone], within 1-2 weeks of discharge.  Followup with the pulmonologist 1 month after discharge for pulmonary function tests and repeat cat scan of the chest to monitor the nodule in your lung.  Followup with the atrial fibrillation doctor within 2 weeks of discharge to evaluate whether a Watchman placed in the heart to decrease risk of stroke would be a good thing for you. Call [phone] for appointments.

## 2018-03-20 DIAGNOSIS — I47.2 VENTRICULAR TACHYCARDIA: ICD-10-CM

## 2018-03-20 LAB
ALBUMIN SERPL ELPH-MCNC: 3.1 G/DL — LOW (ref 3.3–5)
ALP SERPL-CCNC: 138 U/L — HIGH (ref 40–120)
ALT FLD-CCNC: 51 U/L RC — HIGH (ref 10–45)
ANION GAP SERPL CALC-SCNC: 13 MMOL/L — SIGNIFICANT CHANGE UP (ref 5–17)
ANION GAP SERPL CALC-SCNC: 13 MMOL/L — SIGNIFICANT CHANGE UP (ref 5–17)
AST SERPL-CCNC: 53 U/L — HIGH (ref 10–40)
BILIRUB DIRECT SERPL-MCNC: 0.1 MG/DL — SIGNIFICANT CHANGE UP (ref 0–0.2)
BILIRUB INDIRECT FLD-MCNC: 0.3 MG/DL — SIGNIFICANT CHANGE UP (ref 0.2–1)
BILIRUB SERPL-MCNC: 0.4 MG/DL — SIGNIFICANT CHANGE UP (ref 0.2–1.2)
BUN SERPL-MCNC: 40 MG/DL — HIGH (ref 7–23)
BUN SERPL-MCNC: 40 MG/DL — HIGH (ref 7–23)
CALCIUM SERPL-MCNC: 8.8 MG/DL — SIGNIFICANT CHANGE UP (ref 8.4–10.5)
CALCIUM SERPL-MCNC: 9.3 MG/DL — SIGNIFICANT CHANGE UP (ref 8.4–10.5)
CHLORIDE SERPL-SCNC: 103 MMOL/L — SIGNIFICANT CHANGE UP (ref 96–108)
CHLORIDE SERPL-SCNC: 104 MMOL/L — SIGNIFICANT CHANGE UP (ref 96–108)
CO2 SERPL-SCNC: 23 MMOL/L — SIGNIFICANT CHANGE UP (ref 22–31)
CO2 SERPL-SCNC: 24 MMOL/L — SIGNIFICANT CHANGE UP (ref 22–31)
CREAT SERPL-MCNC: 1.38 MG/DL — HIGH (ref 0.5–1.3)
CREAT SERPL-MCNC: 1.52 MG/DL — HIGH (ref 0.5–1.3)
GLUCOSE BLDC GLUCOMTR-MCNC: 129 MG/DL — HIGH (ref 70–99)
GLUCOSE BLDC GLUCOMTR-MCNC: 167 MG/DL — HIGH (ref 70–99)
GLUCOSE BLDC GLUCOMTR-MCNC: 175 MG/DL — HIGH (ref 70–99)
GLUCOSE BLDC GLUCOMTR-MCNC: 258 MG/DL — HIGH (ref 70–99)
GLUCOSE SERPL-MCNC: 165 MG/DL — HIGH (ref 70–99)
GLUCOSE SERPL-MCNC: 182 MG/DL — HIGH (ref 70–99)
H PYLORI AB SER-ACNC: <5 UNITS — SIGNIFICANT CHANGE UP
H PYLORI AG STL QL: NEGATIVE — SIGNIFICANT CHANGE UP
HCT VFR BLD CALC: 35.2 % — LOW (ref 39–50)
HGB BLD-MCNC: 10.5 G/DL — LOW (ref 13–17)
LDH SERPL L TO P-CCNC: 204 U/L — SIGNIFICANT CHANGE UP (ref 50–242)
MAGNESIUM SERPL-MCNC: 2.2 MG/DL — SIGNIFICANT CHANGE UP (ref 1.6–2.6)
MAGNESIUM SERPL-MCNC: 2.3 MG/DL — SIGNIFICANT CHANGE UP (ref 1.6–2.6)
MCHC RBC-ENTMCNC: 25.1 PG — LOW (ref 27–34)
MCHC RBC-ENTMCNC: 29.8 GM/DL — LOW (ref 32–36)
MCV RBC AUTO: 84 FL — SIGNIFICANT CHANGE UP (ref 80–100)
NIGHT BLUE STAIN TISS: SIGNIFICANT CHANGE UP
NRBC # BLD: 0 /100 WBCS — SIGNIFICANT CHANGE UP (ref 0–0)
PHOSPHATE SERPL-MCNC: 2.8 MG/DL — SIGNIFICANT CHANGE UP (ref 2.5–4.5)
PHOSPHATE SERPL-MCNC: 3.2 MG/DL — SIGNIFICANT CHANGE UP (ref 2.5–4.5)
PLATELET # BLD AUTO: 451 K/UL — HIGH (ref 150–400)
POTASSIUM SERPL-MCNC: 4.2 MMOL/L — SIGNIFICANT CHANGE UP (ref 3.5–5.3)
POTASSIUM SERPL-MCNC: 4.2 MMOL/L — SIGNIFICANT CHANGE UP (ref 3.5–5.3)
POTASSIUM SERPL-SCNC: 4.2 MMOL/L — SIGNIFICANT CHANGE UP (ref 3.5–5.3)
POTASSIUM SERPL-SCNC: 4.2 MMOL/L — SIGNIFICANT CHANGE UP (ref 3.5–5.3)
PROT SERPL-MCNC: 6.9 G/DL — SIGNIFICANT CHANGE UP (ref 6–8.3)
PROT SERPL-MCNC: 6.9 G/DL — SIGNIFICANT CHANGE UP (ref 6–8.3)
RBC # BLD: 4.19 M/UL — LOW (ref 4.2–5.8)
RBC # FLD: 17.5 % — HIGH (ref 10.3–14.5)
SODIUM SERPL-SCNC: 139 MMOL/L — SIGNIFICANT CHANGE UP (ref 135–145)
SODIUM SERPL-SCNC: 141 MMOL/L — SIGNIFICANT CHANGE UP (ref 135–145)
SPECIMEN SOURCE: SIGNIFICANT CHANGE UP
WBC # BLD: 13.7 K/UL — HIGH (ref 3.8–10.5)
WBC # FLD AUTO: 13.7 K/UL — HIGH (ref 3.8–10.5)

## 2018-03-20 PROCEDURE — 99233 SBSQ HOSP IP/OBS HIGH 50: CPT | Mod: GC

## 2018-03-20 RX ORDER — CARVEDILOL PHOSPHATE 80 MG/1
12.5 CAPSULE, EXTENDED RELEASE ORAL EVERY 12 HOURS
Qty: 0 | Refills: 0 | Status: DISCONTINUED | OUTPATIENT
Start: 2018-03-20 | End: 2018-03-21

## 2018-03-20 RX ORDER — PANTOPRAZOLE SODIUM 20 MG/1
40 TABLET, DELAYED RELEASE ORAL
Qty: 0 | Refills: 0 | Status: DISCONTINUED | OUTPATIENT
Start: 2018-03-20 | End: 2018-03-23

## 2018-03-20 RX ORDER — ASPIRIN/CALCIUM CARB/MAGNESIUM 324 MG
81 TABLET ORAL DAILY
Qty: 0 | Refills: 0 | Status: DISCONTINUED | OUTPATIENT
Start: 2018-03-20 | End: 2018-03-23

## 2018-03-20 RX ADMIN — BACITRACIN 1 APPLICATION(S): 500 OINTMENT OPHTHALMIC at 12:14

## 2018-03-20 RX ADMIN — TAMSULOSIN HYDROCHLORIDE 0.4 MILLIGRAM(S): 0.4 CAPSULE ORAL at 21:36

## 2018-03-20 RX ADMIN — ATORVASTATIN CALCIUM 40 MILLIGRAM(S): 80 TABLET, FILM COATED ORAL at 21:36

## 2018-03-20 RX ADMIN — PIPERACILLIN AND TAZOBACTAM 25 GRAM(S): 4; .5 INJECTION, POWDER, LYOPHILIZED, FOR SOLUTION INTRAVENOUS at 22:55

## 2018-03-20 RX ADMIN — CARVEDILOL PHOSPHATE 6.25 MILLIGRAM(S): 80 CAPSULE, EXTENDED RELEASE ORAL at 05:15

## 2018-03-20 RX ADMIN — CLOPIDOGREL BISULFATE 75 MILLIGRAM(S): 75 TABLET, FILM COATED ORAL at 12:13

## 2018-03-20 RX ADMIN — Medication 150 MILLIGRAM(S): at 05:17

## 2018-03-20 RX ADMIN — Medication 3: at 12:13

## 2018-03-20 RX ADMIN — LACTULOSE 10 GRAM(S): 10 SOLUTION ORAL at 12:13

## 2018-03-20 RX ADMIN — SENNA PLUS 2 TABLET(S): 8.6 TABLET ORAL at 21:36

## 2018-03-20 RX ADMIN — BRIMONIDINE TARTRATE 1 DROP(S): 2 SOLUTION/ DROPS OPHTHALMIC at 17:37

## 2018-03-20 RX ADMIN — Medication 40 MILLIGRAM(S): at 05:17

## 2018-03-20 RX ADMIN — LOTEPREDNOL ETABONATE 1 DROP(S): 2 SUSPENSION/ DROPS OPHTHALMIC at 17:48

## 2018-03-20 RX ADMIN — PIPERACILLIN AND TAZOBACTAM 25 GRAM(S): 4; .5 INJECTION, POWDER, LYOPHILIZED, FOR SOLUTION INTRAVENOUS at 05:18

## 2018-03-20 RX ADMIN — Medication 150 MILLIGRAM(S): at 17:37

## 2018-03-20 RX ADMIN — BRIMONIDINE TARTRATE 1 DROP(S): 2 SOLUTION/ DROPS OPHTHALMIC at 05:16

## 2018-03-20 RX ADMIN — Medication 1 DROP(S): at 21:36

## 2018-03-20 RX ADMIN — Medication 500 MILLIGRAM(S): at 12:13

## 2018-03-20 RX ADMIN — Medication 1: at 08:19

## 2018-03-20 RX ADMIN — Medication 325 MILLIGRAM(S): at 12:13

## 2018-03-20 RX ADMIN — Medication 1 DROP(S): at 05:17

## 2018-03-20 RX ADMIN — PANTOPRAZOLE SODIUM 40 MILLIGRAM(S): 20 TABLET, DELAYED RELEASE ORAL at 17:37

## 2018-03-20 RX ADMIN — HEPARIN SODIUM 5000 UNIT(S): 5000 INJECTION INTRAVENOUS; SUBCUTANEOUS at 05:15

## 2018-03-20 RX ADMIN — PANTOPRAZOLE SODIUM 40 MILLIGRAM(S): 20 TABLET, DELAYED RELEASE ORAL at 05:15

## 2018-03-20 RX ADMIN — POLYETHYLENE GLYCOL 3350 17 GRAM(S): 17 POWDER, FOR SOLUTION ORAL at 12:13

## 2018-03-20 RX ADMIN — HEPARIN SODIUM 5000 UNIT(S): 5000 INJECTION INTRAVENOUS; SUBCUTANEOUS at 17:37

## 2018-03-20 RX ADMIN — LOTEPREDNOL ETABONATE 1 DROP(S): 2 SUSPENSION/ DROPS OPHTHALMIC at 05:18

## 2018-03-20 RX ADMIN — DORZOLAMIDE HYDROCHLORIDE TIMOLOL MALEATE 1 DROP(S): 20; 5 SOLUTION/ DROPS OPHTHALMIC at 17:37

## 2018-03-20 RX ADMIN — DORZOLAMIDE HYDROCHLORIDE TIMOLOL MALEATE 1 DROP(S): 20; 5 SOLUTION/ DROPS OPHTHALMIC at 05:15

## 2018-03-20 RX ADMIN — PIPERACILLIN AND TAZOBACTAM 25 GRAM(S): 4; .5 INJECTION, POWDER, LYOPHILIZED, FOR SOLUTION INTRAVENOUS at 14:06

## 2018-03-20 RX ADMIN — Medication 1 DROP(S): at 14:07

## 2018-03-20 RX ADMIN — Medication 100 MILLIGRAM(S): at 05:15

## 2018-03-20 RX ADMIN — CARVEDILOL PHOSPHATE 6.25 MILLIGRAM(S): 80 CAPSULE, EXTENDED RELEASE ORAL at 17:37

## 2018-03-20 NOTE — PROGRESS NOTE ADULT - PROBLEM SELECTOR PLAN 2
- Patient's BP over past few days has decreased from nearly 180s SBP to 110s. No complaints from patient and patient is not orthostatic. Will monitor for now.  - c/w Carvedilol to 6.25 BID  - Consider restarting HCTZ at 12.5 or nifedipine if requires additional blood pressure control, currently more hypotensive  - Continue to hold ARB and spironolactone in setting of PAULIE  - Will still need further medical reconciliation regarding home medications (in outpatient medication review on spironolactone, hydrochlorothiazide, valsartan and nifedipine, however valsartan-HCTZ and a few other of his medications may have been discontinued per paperwork from previous hospitalization). Patient's BP over past few days has decreased from nearly 180s SBP to 110s. No complaints from patient and patient is not orthostatic. Will monitor for now, currently normotensive        - c/w Carvedilol to 6.25 BID        - Consider restarting HCTZ at 12.5 or nifedipine if requires additional blood pressure control, currently more hypotensive        - Continue to hold ARB and spironolactone in setting of PAULIE        - Will still need further medical reconciliation regarding home medications (in outpatient medication review on spironolactone, hydrochlorothiazide, valsartan and nifedipine, however valsartan-HCTZ and a few other of his medications may have been discontinued per paperwork from previous hospitalization).

## 2018-03-20 NOTE — PROGRESS NOTE ADULT - SUBJECTIVE AND OBJECTIVE BOX
INTERVAL HPI/OVERNIGHT EVENTS: patient remains feeling better. lying flat, no shortness of breath. on PO lasix, coreg. Noted to have run of AIVR that accelerated into VT today, lasting for 30 seconds, rate up to  120 BPM.    MEDICATIONS  (STANDING):  artificial  tears Solution 1 Drop(s) Both EYES three times a day  ascorbic acid 500 milliGRAM(s) Oral daily  aspirin  chewable 81 milliGRAM(s) Oral daily  atorvastatin 40 milliGRAM(s) Oral at bedtime  BACItracin   Ophthalmic Ointment 1 Application(s) Right EYE daily  brimonidine 0.2% Ophthalmic Solution 1 Drop(s) Right EYE two times a day  carvedilol 6.25 milliGRAM(s) Oral every 12 hours  clopidogrel Tablet 75 milliGRAM(s) Oral daily  dextrose 5%. 1000 milliLiter(s) (50 mL/Hr) IV Continuous <Continuous>  dextrose 50% Injectable 12.5 Gram(s) IV Push once  dextrose 50% Injectable 25 Gram(s) IV Push once  dextrose 50% Injectable 25 Gram(s) IV Push once  docusate sodium 100 milliGRAM(s) Oral two times a day  dorzolamide 2%/timolol 0.5% Ophthalmic Solution 1 Drop(s) Both EYES two times a day  ferrous    sulfate 325 milliGRAM(s) Oral daily  furosemide    Tablet 40 milliGRAM(s) Oral daily  heparin  Injectable 5000 Unit(s) SubCutaneous every 12 hours  insulin lispro (HumaLOG) corrective regimen sliding scale   SubCutaneous at bedtime  insulin lispro (HumaLOG) corrective regimen sliding scale   SubCutaneous three times a day before meals  loteprednol 0.5% Ophthalmic Suspension 1 Drop(s) Both EYES two times a day  pantoprazole    Tablet 40 milliGRAM(s) Oral two times a day  piperacillin/tazobactam IVPB. 3.375 Gram(s) IV Intermittent every 8 hours  polyethylene glycol 3350 17 Gram(s) Oral daily  senna 2 Tablet(s) Oral at bedtime  sodium chloride 0.9%. 1000 milliLiter(s) (50 mL/Hr) IV Continuous <Continuous>  tamsulosin 0.4 milliGRAM(s) Oral daily  vancomycin  IVPB 750 milliGRAM(s) IV Intermittent every 12 hours    MEDICATIONS  (PRN):  acetaminophen   Tablet. 650 milliGRAM(s) Oral every 6 hours PRN Mild and/or moderate and/or severe pain  ALBUTerol/ipratropium for Nebulization 3 milliLiter(s) Nebulizer every 6 hours PRN Shortness of Breath and/or Wheezing  dextrose Gel 1 Dose(s) Oral once PRN Blood Glucose LESS THAN 70 milliGRAM(s)/deciliter  glucagon  Injectable 1 milliGRAM(s) IntraMuscular once PRN Glucose LESS THAN 70 milligrams/deciliter      Allergies    No Known Allergies    Intolerances      ROS:  General: Pt denies recent weight loss/fever/chills    Neurological: denies numbness or  sensation loss    Cardiovascular: denies chest pain/palpitations/leg edema    Respiratory and Thorax: denies SOB/cough/wheezing    Gastrointestinal: denies abdominal pain/diarrhea/constipation/bloody stool    Genitourinary: denies urinary frequency/urgency/ dysuria    Musculoskeletal: denies joint pain or swelling, denies restricted motion    Hematologic: denies abnormal bleeding  	    	  	    		        	    	            Vital Signs Last 24 Hrs  T(C): 36.9 (20 Mar 2018 20:08), Max: 37.1 (20 Mar 2018 11:46)  T(F): 98.5 (20 Mar 2018 20:08), Max: 98.7 (20 Mar 2018 11:46)  HR: 61 (20 Mar 2018 20:08) (60 - 61)  BP: 149/73 (20 Mar 2018 20:08) (123/65 - 149/73)  BP(mean): --  RR: 18 (20 Mar 2018 20:08) (18 - 18)  SpO2: 96% (20 Mar 2018 20:08) (93% - 96%)  Daily     Daily     03-19 @ 07:01 - 03-20 @ 07:00  --------------------------------------------------------  IN: 1495 mL / OUT: 2000 mL / NET: -505 mL    03-20 @ 07:01  - 03-20 @ 21:28  --------------------------------------------------------  IN: 600 mL / OUT: 100 mL / NET: 500 mL      Physical Exam: WDWN mle  jvp decreased  cor rrr  lung clear  abdomen soft  ext no edema        LABS:                        10.5   13.70 )-----------( 451      ( 20 Mar 2018 07:57 )             35.2     03-20    139  |  103  |  40<H>  ----------------------------<  182<H>  4.2   |  23  |  1.38<H>    Ca    9.3      20 Mar 2018 06:29  Phos  2.8     03-20  Mg     2.3     03-20    TPro  6.9  /  Alb  3.1<L>  /  TBili  0.4  /  DBili  0.1  /  AST  53<H>  /  ALT  51<H>  /  AlkPhos  138<H>  03-20          RADIOLOGY & ADDITIONAL TESTS:    TELE:    EKG:

## 2018-03-20 NOTE — PROGRESS NOTE ADULT - ASSESSMENT
82 year old [place] speaking Male with PMHx significant for CAD, TIA, DM Type 2, HTN, AFib (Not on A/C),COPD,  R endarterectomy (7/2017) c/b bleed, partial SBO, admission 10/2017 with septic shock and respiratory failure c/ chest tube and pleurodesis, admitted Jan 2018 for GIB/ anemia s/p emergent EGD (hg of 5.4) with evidence duodenal ulcers/duodenitis/sessile duodenal polyp w/ elevated troponins, renal failure, CXR concerning for persistent RLL process and effusion, paroxysmal Afib/flutter presenting from OSH as CCU transfer for ADHF in setting of ?hypertensive emergency c/b aflutter with Complete Heart Block and polymorphic VT now s/p ICD on 3/16 without further episodes of VT. S/p LHC on 3/15 with 85% stenosis of proximal cx and 100% chronic lesion of pRCA without intervention. Patient is s/p bedside thoracentesis today, and is on IV antibiotics for possible Aspiration Pneumonia.

## 2018-03-20 NOTE — PROGRESS NOTE ADULT - PROBLEM SELECTOR PLAN 3
Moderate loculated pleural effusion  -Pulm consulted. Per pulm will perform pleurocentesis today. Pt is at risk for empyema  - Although patient was intubated for AICD placement, low concern for PNA, as patient is afebrile  - Mild leukocytosis, but may be multifactorial and downtrending Moderate loculated pleural effusion s/p thoracentesis 3/19. 600cc serous fluid removed. Pleural fluid positive for light's criteria, but serum:pleural albumin gradient is >1.2 and serum:pleural protein gradient is >3.1    -f/u fluid cytology, cultures    -Meet's light's criteria

## 2018-03-20 NOTE — PROGRESS NOTE ADULT - ASSESSMENT
82 year old [place] speaking Male with PMHx significant for CAD, TIA, DM Type 2, HTN, AFib (Not on A/C),COPD,  R endarterectomy (7/2017) c/b bleed, partial SBO, admission 10/2017 with septic shock and respiratory failure c/ chest tube and pleurodesis, admitted Jan 2018 for GIB/ anemia s/p emergent EGD (hg of 5.4) with evidence duodenal ulcers/duodenitis/sessile duodenal polyp w/ elevated troponins, renal failure, CXR concerning for persistent RLL process and effusion, paroxysmal Afib/flutter presenting from OSH as CCU transfer for ADHF in setting of ?hypertensive emergency c/b aflutter with Complete Heart Block and polymorphic VT now s/p ICD on 3/16     S/p LHC on 3/15 with 85% stenosis of proximal cx and 100% chronic lesion of pRCA without intervention. Patient is s/p bedside thoracentesis today, and is on IV antibiotics for possible Aspiration Pneumonia.      episode of wide complex tachycardia noted today

## 2018-03-20 NOTE — PROGRESS NOTE ADULT - PROBLEM SELECTOR PLAN 3
post right thoracentesis, felt most likely due to CHF but cannot exclude pneumonia. On vancomycin and zosyn. consider vancomycin trough

## 2018-03-20 NOTE — PROGRESS NOTE ADULT - PROBLEM SELECTOR PLAN 9
Hx complete heart block and Polymorphic VT s/p dual chamber AICD placed 3/15 and no events since. Rate set to 80 bbm  -keep K>4, Mg>2 Hx complete heart block and Polymorphic VT s/p dual chamber AICD placed 3/15 and no events since. Rate set to 80 bbm    -keep K>4, Mg>2

## 2018-03-20 NOTE — PROGRESS NOTE ADULT - SUBJECTIVE AND OBJECTIVE BOX
CHIEF COMPLAINT: dyspnea. PPM placement    Interval Events:  s/p R thoracentesis yesterday. drained 850 cc. this AM, he said he feels ok. breathing is ok. no chest pain. he says he wants to go home now.     REVIEW OF SYSTEMS:  Constitutional: No fevers or chills. No weight loss. No fatigue or generalised malaise.  Eyes: No itching or discharge from the eyes  ENT: No ear pain. No ear discharge. No nasal congestion. No post nasal drip. No epistaxis. No throat pain. No sore throat. No difficulty swallowing.   CV: No chest pain. No palpitations. No lightheadedness or dizziness.   Resp: No dyspnea at rest. No dyspnea on exertion. No orthopnea. No wheezing. No cough. No stridor. No sputum production. No chest pain with respiration.  GI: No nausea. No vomiting. No diarrhea.  MSK: No joint pain or pain in any extremities  Integumentary: No skin lesions. No pedal edema.  Neurological: No gross motor weakness. No sensory changes.  [x] All other systems negative  [ ] Unable to assess ROS because ________    OBJECTIVE:  ICU Vital Signs Last 24 Hrs  T(C): 36.9 (20 Mar 2018 04:12), Max: 36.9 (20 Mar 2018 04:12)  T(F): 98.5 (20 Mar 2018 04:12), Max: 98.5 (20 Mar 2018 04:12)  HR: 60 (20 Mar 2018 04:12) (60 - 69)  BP: 131/71 (20 Mar 2018 04:12) (131/71 - 161/73)  RR: 18 (20 Mar 2018 04:12) (18 - 18)  SpO2: 93% (20 Mar 2018 04:12) (93% - 98%)        03-19 @ 07:01  -  03-20 @ 07:00  --------------------------------------------------------  IN: 1495 mL / OUT: 2000 mL / NET: -505 mL    03-20 @ 07:01  -  03-20 @ 09:34  --------------------------------------------------------  IN: 240 mL / OUT: 0 mL / NET: 240 mL      PHYSICAL EXAM:  General: Awake, alert, oriented X 3. resting in bed. comfortable.   HEENT: Atraumatic, normocephalic.   Lymph Nodes: No palpable lymphadenopathy  Neck: No JVD. No carotid bruit.   Respiratory: Normal chest expansion. Decreased BS on the R base; crackles at the R base.  No wheezing.   Cardiovascular: S1 S2 normal. No murmurs, rubs or gallops.   Abdomen: Soft, non-tender, non-distended. No organomegaly.  Extremities: Warm to touch. Peripheral pulse palpable. No pedal edema.   Skin: No rashes or skin lesions  Neurological: Motor and sensory examination equal and normal in all four extremities.  Psychiatry: Appropriate mood and affect.    HOSPITAL MEDICATIONS:  MEDICATIONS  (STANDING):  artificial  tears Solution 1 Drop(s) Both EYES three times a day  ascorbic acid 500 milliGRAM(s) Oral daily  atorvastatin 40 milliGRAM(s) Oral at bedtime  BACItracin   Ophthalmic Ointment 1 Application(s) Right EYE daily  brimonidine 0.2% Ophthalmic Solution 1 Drop(s) Right EYE two times a day  carvedilol 6.25 milliGRAM(s) Oral every 12 hours  clopidogrel Tablet 75 milliGRAM(s) Oral daily  dextrose 5%. 1000 milliLiter(s) (50 mL/Hr) IV Continuous <Continuous>  dextrose 50% Injectable 12.5 Gram(s) IV Push once  dextrose 50% Injectable 25 Gram(s) IV Push once  dextrose 50% Injectable 25 Gram(s) IV Push once  docusate sodium 100 milliGRAM(s) Oral two times a day  dorzolamide 2%/timolol 0.5% Ophthalmic Solution 1 Drop(s) Both EYES two times a day  ferrous    sulfate 325 milliGRAM(s) Oral daily  furosemide    Tablet 40 milliGRAM(s) Oral daily  heparin  Injectable 5000 Unit(s) SubCutaneous every 12 hours  insulin lispro (HumaLOG) corrective regimen sliding scale   SubCutaneous at bedtime  insulin lispro (HumaLOG) corrective regimen sliding scale   SubCutaneous three times a day before meals  lactulose Syrup 10 Gram(s) Oral daily  loteprednol 0.5% Ophthalmic Suspension 1 Drop(s) Both EYES two times a day  pantoprazole    Tablet 40 milliGRAM(s) Oral before breakfast  piperacillin/tazobactam IVPB. 3.375 Gram(s) IV Intermittent every 8 hours  polyethylene glycol 3350 17 Gram(s) Oral daily  senna 2 Tablet(s) Oral at bedtime  sodium chloride 0.9%. 1000 milliLiter(s) (50 mL/Hr) IV Continuous <Continuous>  tamsulosin 0.4 milliGRAM(s) Oral daily  vancomycin  IVPB 750 milliGRAM(s) IV Intermittent every 12 hours    MEDICATIONS  (PRN):  acetaminophen   Tablet. 650 milliGRAM(s) Oral every 6 hours PRN Mild and/or moderate and/or severe pain  ALBUTerol/ipratropium for Nebulization 3 milliLiter(s) Nebulizer every 6 hours PRN Shortness of Breath and/or Wheezing  dextrose Gel 1 Dose(s) Oral once PRN Blood Glucose LESS THAN 70 milliGRAM(s)/deciliter  glucagon  Injectable 1 milliGRAM(s) IntraMuscular once PRN Glucose LESS THAN 70 milligrams/deciliter      LABS:                        11.0   10.8  )-----------( 383      ( 19 Mar 2018 09:36 )             35.4     03-20    139  |  103  |  40<H>  ----------------------------<  182<H>  4.2   |  23  |  1.38<H>    Ca    9.3      20 Mar 2018 06:29  Phos  2.8     03-20  Mg     2.3     03-20    RADIOLOGY:  [x] Reviewed and interpreted by me  CXR post drainage:   improved R pleural effusion; however still with atelectasis as his has volume loss on the R side.    Pleural Fluid:     TP 2.9   Alb: 1.5   Cx: NGTD CHIEF COMPLAINT: dyspnea. PPM placement    Interval Events:  s/p R thoracentesis yesterday. drained 600 cc. this AM, he said he feels ok. breathing is ok. no chest pain. he says he wants to go home now.     REVIEW OF SYSTEMS:  Constitutional: No fevers or chills. No weight loss. No fatigue or generalised malaise.  Eyes: No itching or discharge from the eyes  ENT: No ear pain. No ear discharge. No nasal congestion. No post nasal drip. No epistaxis. No throat pain. No sore throat. No difficulty swallowing.   CV: No chest pain. No palpitations. No lightheadedness or dizziness.   Resp: No dyspnea at rest. No dyspnea on exertion. No orthopnea. No wheezing. No cough. No stridor. No sputum production. No chest pain with respiration.  GI: No nausea. No vomiting. No diarrhea.  MSK: No joint pain or pain in any extremities  Integumentary: No skin lesions. No pedal edema.  Neurological: No gross motor weakness. No sensory changes.  [x] All other systems negative  [ ] Unable to assess ROS because ________    OBJECTIVE:  ICU Vital Signs Last 24 Hrs  T(C): 36.9 (20 Mar 2018 04:12), Max: 36.9 (20 Mar 2018 04:12)  T(F): 98.5 (20 Mar 2018 04:12), Max: 98.5 (20 Mar 2018 04:12)  HR: 60 (20 Mar 2018 04:12) (60 - 69)  BP: 131/71 (20 Mar 2018 04:12) (131/71 - 161/73)  RR: 18 (20 Mar 2018 04:12) (18 - 18)  SpO2: 93% (20 Mar 2018 04:12) (93% - 98%)        03-19 @ 07:01  -  03-20 @ 07:00  --------------------------------------------------------  IN: 1495 mL / OUT: 2000 mL / NET: -505 mL    03-20 @ 07:01  -  03-20 @ 09:34  --------------------------------------------------------  IN: 240 mL / OUT: 0 mL / NET: 240 mL      PHYSICAL EXAM:  General: Awake, alert, oriented X 3. resting in bed. comfortable.   HEENT: Atraumatic, normocephalic.   Lymph Nodes: No palpable lymphadenopathy  Neck: No JVD. No carotid bruit.   Respiratory: Normal chest expansion. Decreased BS on the R base; crackles at the R base.  No wheezing.   Cardiovascular: S1 S2 normal. No murmurs, rubs or gallops.   Abdomen: Soft, non-tender, non-distended. No organomegaly.  Extremities: Warm to touch. Peripheral pulse palpable. No pedal edema.   Skin: No rashes or skin lesions  Neurological: Motor and sensory examination equal and normal in all four extremities.  Psychiatry: Appropriate mood and affect.    HOSPITAL MEDICATIONS:  MEDICATIONS  (STANDING):  artificial  tears Solution 1 Drop(s) Both EYES three times a day  ascorbic acid 500 milliGRAM(s) Oral daily  atorvastatin 40 milliGRAM(s) Oral at bedtime  BACItracin   Ophthalmic Ointment 1 Application(s) Right EYE daily  brimonidine 0.2% Ophthalmic Solution 1 Drop(s) Right EYE two times a day  carvedilol 6.25 milliGRAM(s) Oral every 12 hours  clopidogrel Tablet 75 milliGRAM(s) Oral daily  dextrose 5%. 1000 milliLiter(s) (50 mL/Hr) IV Continuous <Continuous>  dextrose 50% Injectable 12.5 Gram(s) IV Push once  dextrose 50% Injectable 25 Gram(s) IV Push once  dextrose 50% Injectable 25 Gram(s) IV Push once  docusate sodium 100 milliGRAM(s) Oral two times a day  dorzolamide 2%/timolol 0.5% Ophthalmic Solution 1 Drop(s) Both EYES two times a day  ferrous    sulfate 325 milliGRAM(s) Oral daily  furosemide    Tablet 40 milliGRAM(s) Oral daily  heparin  Injectable 5000 Unit(s) SubCutaneous every 12 hours  insulin lispro (HumaLOG) corrective regimen sliding scale   SubCutaneous at bedtime  insulin lispro (HumaLOG) corrective regimen sliding scale   SubCutaneous three times a day before meals  lactulose Syrup 10 Gram(s) Oral daily  loteprednol 0.5% Ophthalmic Suspension 1 Drop(s) Both EYES two times a day  pantoprazole    Tablet 40 milliGRAM(s) Oral before breakfast  piperacillin/tazobactam IVPB. 3.375 Gram(s) IV Intermittent every 8 hours  polyethylene glycol 3350 17 Gram(s) Oral daily  senna 2 Tablet(s) Oral at bedtime  sodium chloride 0.9%. 1000 milliLiter(s) (50 mL/Hr) IV Continuous <Continuous>  tamsulosin 0.4 milliGRAM(s) Oral daily  vancomycin  IVPB 750 milliGRAM(s) IV Intermittent every 12 hours    MEDICATIONS  (PRN):  acetaminophen   Tablet. 650 milliGRAM(s) Oral every 6 hours PRN Mild and/or moderate and/or severe pain  ALBUTerol/ipratropium for Nebulization 3 milliLiter(s) Nebulizer every 6 hours PRN Shortness of Breath and/or Wheezing  dextrose Gel 1 Dose(s) Oral once PRN Blood Glucose LESS THAN 70 milliGRAM(s)/deciliter  glucagon  Injectable 1 milliGRAM(s) IntraMuscular once PRN Glucose LESS THAN 70 milligrams/deciliter      LABS:                        11.0   10.8  )-----------( 383      ( 19 Mar 2018 09:36 )             35.4     03-20    139  |  103  |  40<H>  ----------------------------<  182<H>  4.2   |  23  |  1.38<H>    Ca    9.3      20 Mar 2018 06:29  Phos  2.8     03-20  Mg     2.3     03-20    RADIOLOGY:  [x] Reviewed and interpreted by me  CXR post drainage:   improved R pleural effusion; however still with atelectasis as his has volume loss on the R side.    Pleural Fluid:     TP 2.9   Alb: 1.5   Cx: NGTD

## 2018-03-20 NOTE — PROGRESS NOTE ADULT - PROBLEM SELECTOR PLAN 7
-Per cardiology, will pursue angioplasty of 85% stenosis of proximal circumflex as an outpatient if can tolerate longterm DAPT  - Continue high intensity statin, Plavix Per cardiology, will pursue angioplasty of 85% stenosis of proximal circumflex as an outpatient if can tolerate longterm DAPT    - Continue high intensity statin, Plavix, star ASA

## 2018-03-20 NOTE — PROGRESS NOTE ADULT - PROBLEM SELECTOR PLAN 5
- Please see physical chart for review of OSH records  - c/w PPI  - f/u H pylori stool antigen and antibody (no evidence of H pylori on pathology report however)  - iron studies more consistent with anemia of chronic disease. Hb is stable  -no further scoping per GI - Please see physical chart for review of OSH records  - c/w PPI BID  - f/u H pylori stool antigen and antibody (no evidence of H pylori on pathology report however)  - iron studies more consistent with anemia of chronic disease. Hb is stable  -no further scoping per GI Please see physical chart for review of OSH records        - c/w PPI BID        - f/u H pylori stool antigen and antibody (no evidence of H pylori on pathology report however)    - iron studies more consistent with anemia of chronic disease. Hb is stable    -no further scoping per GI

## 2018-03-20 NOTE — PROGRESS NOTE ADULT - PROBLEM SELECTOR PLAN 1
- BNP 5000s showing improvement  - Continue lasix 40 po daily  - Strict I/Os and daily weights. Net even today  - Continue PRN duonebs  - Patient is currently on room air, with moderate R loculated pleural effusion, may be 2/2 HF but less likely if only R sided. Pulmonary consulted. BNP 5000s showing improvement, lungs clear, breathing on RA.        - Continue lasix 40 po daily        - Strict I/Os and daily weights. Net even today        - Continue PRN duonebs        - Patient is currently on room air, with moderate R loculated pleural effusion, may be 2/2 HF but less likely if only R sided. Pleural fluid analysis meets Light's criteria for exudative, but serum:pleural albumin gradient is >1.2 and serum:pleural protein gradient is >3.1 is consistent with chronic CHF per pulmonology

## 2018-03-20 NOTE — PROGRESS NOTE ADULT - ASSESSMENT
Mr. Villalpando is a 82 year old man with R sided endarterectomy [7/2017], GI bleed s/p colonic tumor removal in 12/2017, annabelle / theron, presented to OSH with dyspnea -- found to be bradycardiac with a. flutter - variable block. s/p PPM. His respiratory status as significantly improved with diuresis.     Currently he on room air. s/p thoracentesis -- exudative by LDH criteria [but may be elevated 2/2 diuresis] -- but will add on albumin and TP in the serum more recent labs to calculate SAAG. On further hx, he apparently had a thoracentesis back in 12/2017 -- but does not know the results. From the CT, it looks like chronic rounded atelectasis. The f/u CXR shows improved R pleural effusion -- but still with the atelectasis. Therefore, the fluid may be 2/2 the atelectasis v. HF v. malignancy.   - added on the albumin serum for SAAG  - f/u the cytology results   - otherwise, will discuss if any benefit in potential bronchoscopy.     please f/u attg note.     Taj Cooper MD   Pulmonary Fellow Mr. Villalpando is a 82 year old man with R sided endarterectomy [7/2017], GI bleed s/p colonic tumor removal in 12/2017, annabelle / flutter, presented to OSH with dyspnea -- found to be bradycardiac with a. flutter - variable block. s/p PPM. His respiratory status as significantly improved with diuresis.     Currently he on room air. s/p thoracentesis -- exudative by LDH criteria [but may be elevated 2/2 diuresis] -- but will add on albumin and TP in the serum more recent labs to calculate SAAG. On further hx, he apparently had a thoracentesis back in 12/2017 -- but does not know the results. From the CT, it looks like chronic rounded atelectasis. The f/u CXR shows improved R pleural effusion -- but still with the atelectasis. Therefore, the fluid may be 2/2 the atelectasis v. HF v. malignancy.   - added on the albumin serum for SAAG  - f/u the cytology results   - will not bronch as this is chronic and given his risk factors. will follow up out patient.   - he needs a rpt CT scan in 2-3 months.   - he should follow up at pulmonary clinic   [address] [place] [address] [address]   [place]   [phone]    please f/u attg note.     Taj Cooper MD   Pulmonary Fellow Mr. Villalpando is a 82 year old man with R sided endarterectomy [7/2017], GI bleed s/p colonic tumor removal in 12/2017, annabelle / flutter, presented to OSH with dyspnea -- found to be bradycardiac with a. flutter - variable block. s/p PPM. His respiratory status as significantly improved with diuresis.     Currently he on room air. s/p thoracentesis -- exudative by LDH criteria [but may be elevated 2/2 diuresis] -- but will add on albumin and TP in the serum more recent labs to calculate SAAG. On further hx, he apparently had a thoracentesis back in 12/2017 -- but does not know the results. From the CT, it looks like chronic rounded atelectasis. The f/u CXR shows improved R pleural effusion -- but still with the atelectasis. Therefore, the fluid may be 2/2 the atelectasis v. HF v. malignancy.   - added on the albumin serum for SAAG  - f/u the cytology results   - will not bronch as this is chronic and given his risk factors. will follow up out patient.   - he needs a rpt CT scan in 2-3 months.   - he should follow up at pulmonary clinic   [address] [place] [address] [address]   [place]   [phone]    please f/u attg note.     Taj Cooper MD   Pulmonary Fellow

## 2018-03-20 NOTE — PROGRESS NOTE ADULT - PROBLEM SELECTOR PLAN 6
CT chest found 1cm ZORAIDA nodule, significant smoking hx  -f/u outpatient repeat CT chest in 3 months  COPD: baldo, jordanan, will need outpatient PFTs  Aspiration pneumonia: will empirically treat for aspiration due to pt needing intubation for airway protection and leukocytosis  -c/w vanc and zosyn (3/19 - ) for 5d course, can d/c on doxycycline  -to comlplete 5d course for possible PNA CT chest found 1cm ZORAIDA nodule, significant smoking hx        -f/u outpatient repeat CT chest in 2-3 months        COPD: baldo, prn, will need outpatient PFTs        Aspiration pneumonia: will empirically treat for aspiration due to pt needing intubation for airway protection and leukocytosis    -c/w vanc and zosyn (3/19 - ) for 5d course, can d/c on doxycycline    -to comlplete 5d course for possible PNA

## 2018-03-20 NOTE — PROGRESS NOTE ADULT - SUBJECTIVE AND OBJECTIVE BOX
24H hour events: No over night events. Patient resting comfortably in bed, denies c/o CP, palpitations or SOB.     MEDICATIONS:  carvedilol 6.25 milliGRAM(s) Oral every 12 hours  clopidogrel Tablet 75 milliGRAM(s) Oral daily  furosemide    Tablet 40 milliGRAM(s) Oral daily  heparin  Injectable 5000 Unit(s) SubCutaneous every 12 hours  tamsulosin 0.4 milliGRAM(s) Oral daily    piperacillin/tazobactam IVPB. 3.375 Gram(s) IV Intermittent every 8 hours  vancomycin  IVPB 750 milliGRAM(s) IV Intermittent every 12 hours    ALBUTerol/ipratropium for Nebulization 3 milliLiter(s) Nebulizer every 6 hours PRN    acetaminophen   Tablet. 650 milliGRAM(s) Oral every 6 hours PRN    docusate sodium 100 milliGRAM(s) Oral two times a day  lactulose Syrup 10 Gram(s) Oral daily  pantoprazole    Tablet 40 milliGRAM(s) Oral two times a day  polyethylene glycol 3350 17 Gram(s) Oral daily  senna 2 Tablet(s) Oral at bedtime    atorvastatin 40 milliGRAM(s) Oral at bedtime  dextrose 50% Injectable 12.5 Gram(s) IV Push once  dextrose 50% Injectable 25 Gram(s) IV Push once  dextrose 50% Injectable 25 Gram(s) IV Push once  dextrose Gel 1 Dose(s) Oral once PRN  glucagon  Injectable 1 milliGRAM(s) IntraMuscular once PRN  insulin lispro (HumaLOG) corrective regimen sliding scale   SubCutaneous at bedtime  insulin lispro (HumaLOG) corrective regimen sliding scale   SubCutaneous three times a day before meals    artificial  tears Solution 1 Drop(s) Both EYES three times a day  ascorbic acid 500 milliGRAM(s) Oral daily  BACItracin   Ophthalmic Ointment 1 Application(s) Right EYE daily  brimonidine 0.2% Ophthalmic Solution 1 Drop(s) Right EYE two times a day  dextrose 5%. 1000 milliLiter(s) IV Continuous <Continuous>  dorzolamide 2%/timolol 0.5% Ophthalmic Solution 1 Drop(s) Both EYES two times a day  ferrous    sulfate 325 milliGRAM(s) Oral daily  loteprednol 0.5% Ophthalmic Suspension 1 Drop(s) Both EYES two times a day  sodium chloride 0.9%. 1000 milliLiter(s) IV Continuous <Continuous>      REVIEW OF SYSTEMS:  Complete 10point ROS negative.    PHYSICAL EXAM:  T(C): 37.1 (03-20-18 @ 11:46), Max: 37.1 (03-20-18 @ 11:46)  HR: 60 (03-20-18 @ 11:46) (60 - 61)  BP: 123/65 (03-20-18 @ 11:46) (123/65 - 161/73)  RR: 18 (03-20-18 @ 11:46) (18 - 18)  SpO2: 96% (03-20-18 @ 11:46) (93% - 98%)      19 Mar 2018 07:01  -  20 Mar 2018 07:00  --------------------------------------------------------  IN: 1495 mL / OUT: 2000 mL / NET: -505 mL    20 Mar 2018 07:01  -  20 Mar 2018 12:12  --------------------------------------------------------  IN: 240 mL / OUT: 0 mL / NET: 240 mL      Appearance: Normal	  Cardiovascular: Normal S1 S2, irregular. No JVD, No murmurs, No edema  Respiratory: Lungs clear to auscultation	  Psychiatry: A & O x 2, Mood & affect appropriate  Gastrointestinal:  Soft, Non-tender, + BS	  Skin: No rashes, No ecchymoses, No cyanosis	  Neurologic: Non-focal  Extremities: Normal range of motion, No clubbing, cyanosis or edema  Vascular: Peripheral pulses palpable 2+ bilaterally      LABS:	 	    CBC Full  -  ( 20 Mar 2018 07:57 )  WBC Count : 13.70 K/uL  Hemoglobin : 10.5 g/dL  Hematocrit : 35.2 %  Platelet Count - Automated : 451 K/uL  Mean Cell Volume : 84.0 fl  Mean Cell Hemoglobin : 25.1 pg  Mean Cell Hemoglobin Concentration : 29.8 gm/dL  Auto Neutrophil # : x  Auto Lymphocyte # : x  Auto Monocyte # : x  Auto Eosinophil # : x  Auto Basophil # : x  Auto Neutrophil % : x  Auto Lymphocyte % : x  Auto Monocyte % : x  Auto Eosinophil % : x  Auto Basophil % : x    03-20    139  |  103  |  40<H>  ----------------------------<  182<H>  4.2   |  23  |  1.38<H>  03-19    140  |  102  |  47<H>  ----------------------------<  189<H>  3.7   |  26  |  1.41<H>    Ca    9.3      20 Mar 2018 06:29  Ca    9.2      19 Mar 2018 09:08  Phos  2.8     03-20  Phos  3.2     03-19  Mg     2.3     03-20  Mg     2.2     03-19    TPro  6.9  /  Alb  3.1<L>  /  TBili  0.4  /  DBili  0.1  /  AST  53<H>  /  ALT  51<H>  /  AlkPhos  138<H>  03-20      proBNP: Serum Pro-Brain Natriuretic Peptide: 5334 pg/mL (03-18 @ 05:58)              TELEMETRY: 	  AFib, V Paced 60's

## 2018-03-20 NOTE — PROGRESS NOTE ADULT - PROBLEM SELECTOR PLAN 4
- Rate well controlled AV paced 60s-90s  - Per GI, no scope this admission. f/u regarding DAPT or coumadin in addition to plavix  -House EP consulted whether watchman  -Per OSH endoscopy and colonoscopy reports show patient initially presented with Hg 5.4 requiring urgent EGD at previous hospitalization which was notable for hiatal hernia, duodenal ulcer and duodenitis. Colonoscopy was also performed at that time showing 8 mm sessile polyp. (Ie procedure preceded bleeding event rather than as a complication). CHADSVasc Score 7. Rate well controlled AV paced 60s-90s  - Per GI, no scope this admission, no contraindication to DAPT or coumadin  -House EP consulted whether watchman  -Per OSH endoscopy and colonoscopy reports show patient initially presented with Hg 5.4 requiring urgent EGD at previous hospitalization which was notable for hiatal hernia, duodenal ulcer and duodenitis. Colonoscopy was also performed at that time showing 8 mm sessile polyp. (Ie procedure preceded bleeding event rather than as a complication). CHADSVasc Score 7. Rate well controlled AV paced 60s-90s        - Per GI, no scope this admission, no contraindication to DAPT or coumadin        -House EP consulted whether watchman        -Per OSH endoscopy and colonoscopy reports show patient initially presented with Hg 5.4 requiring urgent EGD at previous hospitalization which was notable for hiatal hernia, duodenal ulcer and duodenitis. Colonoscopy was also performed at that time showing 8 mm sessile polyp. (Ie procedure preceded bleeding event rather than as a complication).

## 2018-03-20 NOTE — PROGRESS NOTE ADULT - ASSESSMENT
83 yo [place]itanian speaking M PMHx DMT2, HTN, R endarterectomy (7/2017) c/b bleed, partial SBO, admission 10/2017 with septic shock and respiratory failure c/ chest tube and pleurodesis, admitted Jan 2018 for anemia s/p emergent EGD (hg of 5.4) with evidence duodenal ulcers/duodenitis/sessile duodenal polyp w/ elevated troponins, renal failure, CXR concerning for persistent RLL process and effusion, paroxysmal Afib/flutter presenting from OSH as CCU transfer for ADHF in setting of ?hypertensive emergency c/b aflutter with variable block/junctional escape and polymorphic VT now s/p dual chamber AICD without further episodes of VT. s/p LHC with 85% stenosis of proximal cx and  of pRCA without intervention.

## 2018-03-20 NOTE — PROGRESS NOTE ADULT - PROBLEM SELECTOR PLAN 1
-Atrial fibrillation, with CHADSVasc Score 7  possible RADHA once patient is able to assess [place] [id number]

## 2018-03-20 NOTE — PROGRESS NOTE ADULT - PROBLEM SELECTOR PLAN 10
DVT ppx: SCDs  Bowel: bowel regimen  Diet: DASH  11) Acute nonintractable headache  - Patient denies c/o headache currently and no focal neuro deficits  - If persistent or new neuro deficits consider CT head. Not associated with hypertension at time of headache.   - Tylenol PRN  - Hold off on CT head at this time DVT ppx: SCDs        Bowel: bowel regimen        Diet: DASH        11) Acute nonintractable headache        - Patient denies c/o headache currently and no focal neuro deficits        - If persistent or new neuro deficits consider CT head. Not associated with hypertension at time of headache.     - Tylenol PRN    - Hold off on CT head at this time

## 2018-03-20 NOTE — PROGRESS NOTE ADULT - SUBJECTIVE AND OBJECTIVE BOX
Patient is a 82y old  Male who presents with a chief complaint of Shortness of breath (13 Mar 2018 23:00)    Shiela Thomas, PGY1  Internal Medicine team 3  Pager [phone] / [phone]    SUBJECTIVE / OVERNIGHT EVENTS: Passed TTV overnight after fernández removed.    MEDICATIONS  (STANDING):  artificial  tears Solution 1 Drop(s) Both EYES three times a day  ascorbic acid 500 milliGRAM(s) Oral daily  atorvastatin 40 milliGRAM(s) Oral at bedtime  BACItracin   Ophthalmic Ointment 1 Application(s) Right EYE daily  brimonidine 0.2% Ophthalmic Solution 1 Drop(s) Right EYE two times a day  carvedilol 6.25 milliGRAM(s) Oral every 12 hours  clopidogrel Tablet 75 milliGRAM(s) Oral daily  dextrose 5%. 1000 milliLiter(s) (50 mL/Hr) IV Continuous <Continuous>  dextrose 50% Injectable 12.5 Gram(s) IV Push once  dextrose 50% Injectable 25 Gram(s) IV Push once  dextrose 50% Injectable 25 Gram(s) IV Push once  docusate sodium 100 milliGRAM(s) Oral two times a day  dorzolamide 2%/timolol 0.5% Ophthalmic Solution 1 Drop(s) Both EYES two times a day  ferrous    sulfate 325 milliGRAM(s) Oral daily  furosemide    Tablet 40 milliGRAM(s) Oral daily  heparin  Injectable 5000 Unit(s) SubCutaneous every 12 hours  insulin lispro (HumaLOG) corrective regimen sliding scale   SubCutaneous at bedtime  insulin lispro (HumaLOG) corrective regimen sliding scale   SubCutaneous three times a day before meals  lactulose Syrup 10 Gram(s) Oral daily  loteprednol 0.5% Ophthalmic Suspension 1 Drop(s) Both EYES two times a day  pantoprazole    Tablet 40 milliGRAM(s) Oral before breakfast  piperacillin/tazobactam IVPB. 3.375 Gram(s) IV Intermittent every 8 hours  polyethylene glycol 3350 17 Gram(s) Oral daily  senna 2 Tablet(s) Oral at bedtime  sodium chloride 0.9%. 1000 milliLiter(s) (50 mL/Hr) IV Continuous <Continuous>  tamsulosin 0.4 milliGRAM(s) Oral daily  vancomycin  IVPB 750 milliGRAM(s) IV Intermittent every 12 hours    MEDICATIONS  (PRN):  acetaminophen   Tablet. 650 milliGRAM(s) Oral every 6 hours PRN Mild and/or moderate and/or severe pain  ALBUTerol/ipratropium for Nebulization 3 milliLiter(s) Nebulizer every 6 hours PRN Shortness of Breath and/or Wheezing  dextrose Gel 1 Dose(s) Oral once PRN Blood Glucose LESS THAN 70 milliGRAM(s)/deciliter  glucagon  Injectable 1 milliGRAM(s) IntraMuscular once PRN Glucose LESS THAN 70 milligrams/deciliter      Vital Signs Last 24 Hrs  T(C): 36.9 (20 Mar 2018 04:12), Max: 36.9 (20 Mar 2018 04:12)  T(F): 98.5 (20 Mar 2018 04:12), Max: 98.5 (20 Mar 2018 04:12)  HR: 60 (20 Mar 2018 04:12) (59 - 69)  BP: 131/71 (20 Mar 2018 04:12) (121/62 - 161/73)  BP(mean): --  RR: 18 (20 Mar 2018 04:12) (18 - 18)  SpO2: 93% (20 Mar 2018 04:12) (93% - 98%)    CAPILLARY BLOOD GLUCOSE      POCT Blood Glucose.: 271 mg/dL (19 Mar 2018 21:50)  POCT Blood Glucose.: 178 mg/dL (19 Mar 2018 16:33)  POCT Blood Glucose.: 330 mg/dL (19 Mar 2018 11:24)  POCT Blood Glucose.: 167 mg/dL (19 Mar 2018 07:25)      I&O's Summary    19 Mar 2018 07:01  -  20 Mar 2018 07:00  --------------------------------------------------------  IN: 1495 mL / OUT: 2000 mL / NET: -505 mL     NET: -880 mL      PHYSICAL EXAM:  GENERAL: NAD, well-developed  HEAD:  Atraumatic  EYES: EOMI, PERRL, decreased vision b/l at baseline. +conjunctival injection chronic per wife  NECK: Supple, No JVD  CHEST/LUNG: faint LLL rales. No wheezes or rhonchi. Poor inspiratory effort  HEART: Regular rate and rhythm; No murmurs, rubs, or gallops  ABDOMEN: Soft, Nontender, Nondistended; Bowel sounds present  EXTREMITIES:  2+ Peripheral Pulses, No clubbing, cyanosis, or edema  NEUROLOGY: A&O x3  SKIN: No rashes or lesions    LABS:                        11.0   10.8  )-----------( 383      ( 19 Mar 2018 09:36 )             35.4     CBC Full  -  ( 19 Mar 2018 09:36 )  WBC Count : 10.8 K/uL  Hemoglobin : 11.0 g/dL  Hematocrit : 35.4 %  Platelet Count - Automated : 383 K/uL  Mean Cell Volume : 85.2 fl  Mean Cell Hemoglobin : 26.4 pg  Mean Cell Hemoglobin Concentration : 30.9 gm/dL  Auto Neutrophil # : x  Auto Lymphocyte # : x  Auto Monocyte # : x  Auto Eosinophil # : x  Auto Basophil # : x  Auto Neutrophil % : x  Auto Lymphocyte % : x  Auto Monocyte % : x  Auto Eosinophil % : x  Auto Basophil % : x    03-20    139  |  103  |  40<H>  ----------------------------<  182<H>  4.2   |  23  |  1.38<H>    Ca    9.3      20 Mar 2018 06:29  Phos  2.8     03-20  Mg     2.3     03-20      Creatinine Trend: 1.38<--, 1.41<--, 1.60<--, 1.41<--, 1.36<--, 1.43<--        MICROBIOLOGY:    RADIOLOGY & ADDITIONAL TESTS:  < from: Limited Transthoracic Echo (03.18.18 @ 08:53) >  CONCLUSIONS:  1. Endocardium not well visualized; grossly normal left  ventricular systolic function.  2. No pericardial effusion seen.    < end of copied text >      Imaging Personally Reviewed: CT chest    Consultant(s) Notes Reviewed:  EP    Care Discussed with Consultants/Other Providers: EP Patient is a 82y old  Male who presents with a chief complaint of Shortness of breath (13 Mar 2018 23:00)    Shiela Thomas, PGY1  Internal Medicine team 3  Pager [phone] / [phone]    SUBJECTIVE / OVERNIGHT EVENTS: Passed TTV overnight after fernández removed. Pt has no complaints. Denies CP, SOB.    MEDICATIONS  (STANDING):  artificial  tears Solution 1 Drop(s) Both EYES three times a day  ascorbic acid 500 milliGRAM(s) Oral daily  atorvastatin 40 milliGRAM(s) Oral at bedtime  BACItracin   Ophthalmic Ointment 1 Application(s) Right EYE daily  brimonidine 0.2% Ophthalmic Solution 1 Drop(s) Right EYE two times a day  carvedilol 6.25 milliGRAM(s) Oral every 12 hours  clopidogrel Tablet 75 milliGRAM(s) Oral daily  dextrose 5%. 1000 milliLiter(s) (50 mL/Hr) IV Continuous <Continuous>  dextrose 50% Injectable 12.5 Gram(s) IV Push once  dextrose 50% Injectable 25 Gram(s) IV Push once  dextrose 50% Injectable 25 Gram(s) IV Push once  docusate sodium 100 milliGRAM(s) Oral two times a day  dorzolamide 2%/timolol 0.5% Ophthalmic Solution 1 Drop(s) Both EYES two times a day  ferrous    sulfate 325 milliGRAM(s) Oral daily  furosemide    Tablet 40 milliGRAM(s) Oral daily  heparin  Injectable 5000 Unit(s) SubCutaneous every 12 hours  insulin lispro (HumaLOG) corrective regimen sliding scale   SubCutaneous at bedtime  insulin lispro (HumaLOG) corrective regimen sliding scale   SubCutaneous three times a day before meals  lactulose Syrup 10 Gram(s) Oral daily  loteprednol 0.5% Ophthalmic Suspension 1 Drop(s) Both EYES two times a day  pantoprazole    Tablet 40 milliGRAM(s) Oral before breakfast  piperacillin/tazobactam IVPB. 3.375 Gram(s) IV Intermittent every 8 hours  polyethylene glycol 3350 17 Gram(s) Oral daily  senna 2 Tablet(s) Oral at bedtime  sodium chloride 0.9%. 1000 milliLiter(s) (50 mL/Hr) IV Continuous <Continuous>  tamsulosin 0.4 milliGRAM(s) Oral daily  vancomycin  IVPB 750 milliGRAM(s) IV Intermittent every 12 hours    MEDICATIONS  (PRN):  acetaminophen   Tablet. 650 milliGRAM(s) Oral every 6 hours PRN Mild and/or moderate and/or severe pain  ALBUTerol/ipratropium for Nebulization 3 milliLiter(s) Nebulizer every 6 hours PRN Shortness of Breath and/or Wheezing  dextrose Gel 1 Dose(s) Oral once PRN Blood Glucose LESS THAN 70 milliGRAM(s)/deciliter  glucagon  Injectable 1 milliGRAM(s) IntraMuscular once PRN Glucose LESS THAN 70 milligrams/deciliter      Vital Signs Last 24 Hrs  T(C): 36.9 (20 Mar 2018 04:12), Max: 36.9 (20 Mar 2018 04:12)  T(F): 98.5 (20 Mar 2018 04:12), Max: 98.5 (20 Mar 2018 04:12)  HR: 60 (20 Mar 2018 04:12) (59 - 69)  BP: 131/71 (20 Mar 2018 04:12) (121/62 - 161/73)  BP(mean): --  RR: 18 (20 Mar 2018 04:12) (18 - 18)  SpO2: 93% (20 Mar 2018 04:12) (93% - 98%)    CAPILLARY BLOOD GLUCOSE      POCT Blood Glucose.: 271 mg/dL (19 Mar 2018 21:50)  POCT Blood Glucose.: 178 mg/dL (19 Mar 2018 16:33)  POCT Blood Glucose.: 330 mg/dL (19 Mar 2018 11:24)  POCT Blood Glucose.: 167 mg/dL (19 Mar 2018 07:25)      I&O's Summary    19 Mar 2018 07:01  -  20 Mar 2018 07:00  --------------------------------------------------------  IN: 1495 mL / OUT: 2000 mL / NET: -505 mL     NET: -880 mL      PHYSICAL EXAM:  GENERAL: NAD, well-developed  HEAD:  Atraumatic  EYES: EOMI, PERRL, decreased vision b/l at baseline. +conjunctival injection chronic per wife  NECK: Supple, No JVD  CHEST/LUNG: faint LLL rales. No wheezes or rhonchi. Poor inspiratory effort  HEART: Regular rate and rhythm; No murmurs, rubs, or gallops  ABDOMEN: Soft, Nontender, Nondistended; Bowel sounds present  EXTREMITIES:  2+ Peripheral Pulses, No clubbing, cyanosis, or edema  NEUROLOGY: A&O x3  SKIN: No rashes or lesions    LABS:                        11.0   10.8  )-----------( 383      ( 19 Mar 2018 09:36 )             35.4     CBC Full  -  ( 19 Mar 2018 09:36 )  WBC Count : 10.8 K/uL  Hemoglobin : 11.0 g/dL  Hematocrit : 35.4 %  Platelet Count - Automated : 383 K/uL  Mean Cell Volume : 85.2 fl  Mean Cell Hemoglobin : 26.4 pg  Mean Cell Hemoglobin Concentration : 30.9 gm/dL  Auto Neutrophil # : x  Auto Lymphocyte # : x  Auto Monocyte # : x  Auto Eosinophil # : x  Auto Basophil # : x  Auto Neutrophil % : x  Auto Lymphocyte % : x  Auto Monocyte % : x  Auto Eosinophil % : x  Auto Basophil % : x    03-20    139  |  103  |  40<H>  ----------------------------<  182<H>  4.2   |  23  |  1.38<H>    Ca    9.3      20 Mar 2018 06:29  Phos  2.8     03-20  Mg     2.3     03-20      Creatinine Trend: 1.38<--, 1.41<--, 1.60<--, 1.41<--, 1.36<--, 1.43<--        MICROBIOLOGY:    RADIOLOGY & ADDITIONAL TESTS:  < from: Limited Transthoracic Echo (03.18.18 @ 08:53) >  CONCLUSIONS:  1. Endocardium not well visualized; grossly normal left  ventricular systolic function.  2. No pericardial effusion seen.    < end of copied text >      Imaging Personally Reviewed: CT chest    Consultant(s) Notes Reviewed:  EP    Care Discussed with Consultants/Other Providers: EP

## 2018-03-20 NOTE — CHART NOTE - NSCHARTNOTEFT_GEN_A_CORE
GI Fellow chart update:    -No apparent GI contraindication to using aspirin, plavix or anticoagulants (warfarin etc.)  -maintain the patient on twice daily PPI.  -follow up H.pylori antigen and treat if positive  -will sign off, call with questions, especially if the patient develops any clinically apparent GI symptoms.    D/w Dr. Martínez

## 2018-03-20 NOTE — PROGRESS NOTE ADULT - PROBLEM SELECTOR PLAN 1
-Atrial fibrillation, with CHADSVasc Score 7  -GI called, awaiting GI input regarding long term anticoagulation for AFib, vs.   Watchman procedure with short term anticoagulation   -Watchman procedure discussed with patient/ wife at bedside   -Await RADHA once patient is able to assess for candidacy of Watchman     LAMONTE Han NP [id number]

## 2018-03-21 LAB
ANION GAP SERPL CALC-SCNC: 10 MMOL/L — SIGNIFICANT CHANGE UP (ref 5–17)
APTT BLD: 30.1 SEC — SIGNIFICANT CHANGE UP (ref 27.5–37.4)
BUN SERPL-MCNC: 36 MG/DL — HIGH (ref 7–23)
CALCIUM SERPL-MCNC: 9.4 MG/DL — SIGNIFICANT CHANGE UP (ref 8.4–10.5)
CHLORIDE SERPL-SCNC: 107 MMOL/L — SIGNIFICANT CHANGE UP (ref 96–108)
CO2 SERPL-SCNC: 26 MMOL/L — SIGNIFICANT CHANGE UP (ref 22–31)
CREAT SERPL-MCNC: 1.44 MG/DL — HIGH (ref 0.5–1.3)
GLUCOSE BLDC GLUCOMTR-MCNC: 123 MG/DL — HIGH (ref 70–99)
GLUCOSE BLDC GLUCOMTR-MCNC: 137 MG/DL — HIGH (ref 70–99)
GLUCOSE BLDC GLUCOMTR-MCNC: 151 MG/DL — HIGH (ref 70–99)
GLUCOSE BLDC GLUCOMTR-MCNC: 191 MG/DL — HIGH (ref 70–99)
GLUCOSE BLDC GLUCOMTR-MCNC: 214 MG/DL — HIGH (ref 70–99)
GLUCOSE BLDC GLUCOMTR-MCNC: 264 MG/DL — HIGH (ref 70–99)
GLUCOSE SERPL-MCNC: 156 MG/DL — HIGH (ref 70–99)
GRAM STN FLD: SIGNIFICANT CHANGE UP
HCT VFR BLD CALC: 34.7 % — LOW (ref 39–50)
HGB BLD-MCNC: 10.2 G/DL — LOW (ref 13–17)
INR BLD: 1.11 RATIO — SIGNIFICANT CHANGE UP (ref 0.88–1.16)
MAGNESIUM SERPL-MCNC: 2.2 MG/DL — SIGNIFICANT CHANGE UP (ref 1.6–2.6)
MCHC RBC-ENTMCNC: 24.9 PG — LOW (ref 27–34)
MCHC RBC-ENTMCNC: 29.4 GM/DL — LOW (ref 32–36)
MCV RBC AUTO: 84.6 FL — SIGNIFICANT CHANGE UP (ref 80–100)
NRBC # BLD: 0 /100 WBCS — SIGNIFICANT CHANGE UP (ref 0–0)
PLATELET # BLD AUTO: 415 K/UL — HIGH (ref 150–400)
POTASSIUM SERPL-MCNC: 4.4 MMOL/L — SIGNIFICANT CHANGE UP (ref 3.5–5.3)
POTASSIUM SERPL-SCNC: 4.4 MMOL/L — SIGNIFICANT CHANGE UP (ref 3.5–5.3)
PROTHROM AB SERPL-ACNC: 12.6 SEC — SIGNIFICANT CHANGE UP (ref 10–13.1)
RBC # BLD: 4.1 M/UL — LOW (ref 4.2–5.8)
RBC # FLD: 17.7 % — HIGH (ref 10.3–14.5)
SODIUM SERPL-SCNC: 143 MMOL/L — SIGNIFICANT CHANGE UP (ref 135–145)
VANCOMYCIN TROUGH SERPL-MCNC: 16.6 UG/ML — SIGNIFICANT CHANGE UP (ref 10–20)
WBC # BLD: 11.31 K/UL — HIGH (ref 3.8–10.5)
WBC # FLD AUTO: 11.31 K/UL — HIGH (ref 3.8–10.5)

## 2018-03-21 PROCEDURE — 99152 MOD SED SAME PHYS/QHP 5/>YRS: CPT

## 2018-03-21 PROCEDURE — 99233 SBSQ HOSP IP/OBS HIGH 50: CPT | Mod: GC

## 2018-03-21 PROCEDURE — 92928 PRQ TCAT PLMT NTRAC ST 1 LES: CPT | Mod: LC

## 2018-03-21 PROCEDURE — 76937 US GUIDE VASCULAR ACCESS: CPT | Mod: 26

## 2018-03-21 RX ORDER — OMEPRAZOLE 10 MG/1
1 CAPSULE, DELAYED RELEASE ORAL
Qty: 0 | Refills: 0 | COMMUNITY

## 2018-03-21 RX ORDER — INSULIN GLARGINE 100 [IU]/ML
3 INJECTION, SOLUTION SUBCUTANEOUS AT BEDTIME
Qty: 0 | Refills: 0 | Status: DISCONTINUED | OUTPATIENT
Start: 2018-03-21 | End: 2018-03-23

## 2018-03-21 RX ORDER — DORZOLAMIDE HYDROCHLORIDE TIMOLOL MALEATE 20; 5 MG/ML; MG/ML
1 SOLUTION/ DROPS OPHTHALMIC
Qty: 0 | Refills: 0 | COMMUNITY

## 2018-03-21 RX ORDER — CILOSTAZOL 100 MG/1
1 TABLET ORAL
Qty: 0 | Refills: 0 | COMMUNITY

## 2018-03-21 RX ORDER — FERROUS SULFATE 325(65) MG
1 TABLET ORAL
Qty: 0 | Refills: 0 | COMMUNITY

## 2018-03-21 RX ORDER — CHOLECALCIFEROL (VITAMIN D3) 125 MCG
1 CAPSULE ORAL
Qty: 0 | Refills: 0 | COMMUNITY

## 2018-03-21 RX ORDER — BRIMONIDINE TARTRATE 2 MG/MG
1 SOLUTION/ DROPS OPHTHALMIC
Qty: 0 | Refills: 0 | COMMUNITY

## 2018-03-21 RX ORDER — TAMSULOSIN HYDROCHLORIDE 0.4 MG/1
1 CAPSULE ORAL
Qty: 0 | Refills: 0 | COMMUNITY

## 2018-03-21 RX ORDER — PIPERACILLIN AND TAZOBACTAM 4; .5 G/20ML; G/20ML
3.38 INJECTION, POWDER, LYOPHILIZED, FOR SOLUTION INTRAVENOUS EVERY 8 HOURS
Qty: 0 | Refills: 0 | Status: DISCONTINUED | OUTPATIENT
Start: 2018-03-21 | End: 2018-03-21

## 2018-03-21 RX ORDER — VANCOMYCIN HCL 1 G
750 VIAL (EA) INTRAVENOUS EVERY 12 HOURS
Qty: 0 | Refills: 0 | Status: COMPLETED | OUTPATIENT
Start: 2018-03-21 | End: 2018-03-22

## 2018-03-21 RX ORDER — ZOLPIDEM TARTRATE 10 MG/1
5 TABLET ORAL AT BEDTIME
Qty: 0 | Refills: 0 | Status: DISCONTINUED | OUTPATIENT
Start: 2018-03-21 | End: 2018-03-23

## 2018-03-21 RX ORDER — ICOSAPENT ETHYL 500 MG/1
2 CAPSULE, LIQUID FILLED ORAL
Qty: 0 | Refills: 0 | COMMUNITY

## 2018-03-21 RX ORDER — ZOLPIDEM TARTRATE 10 MG/1
1 TABLET ORAL
Qty: 0 | Refills: 0 | COMMUNITY

## 2018-03-21 RX ORDER — CARVEDILOL PHOSPHATE 80 MG/1
6.25 CAPSULE, EXTENDED RELEASE ORAL EVERY 12 HOURS
Qty: 0 | Refills: 0 | Status: DISCONTINUED | OUTPATIENT
Start: 2018-03-21 | End: 2018-03-23

## 2018-03-21 RX ORDER — TAMSULOSIN HYDROCHLORIDE 0.4 MG/1
2 CAPSULE ORAL
Qty: 0 | Refills: 0 | COMMUNITY

## 2018-03-21 RX ORDER — GLIMEPIRIDE 1 MG
1 TABLET ORAL
Qty: 0 | Refills: 0 | COMMUNITY

## 2018-03-21 RX ORDER — LOTEPREDNOL ETABONATE 2 MG/ML
1 SUSPENSION/ DROPS OPHTHALMIC
Qty: 0 | Refills: 0 | COMMUNITY

## 2018-03-21 RX ORDER — CLOPIDOGREL BISULFATE 75 MG/1
1 TABLET, FILM COATED ORAL
Qty: 0 | Refills: 0 | COMMUNITY

## 2018-03-21 RX ORDER — FENTANYL CITRATE 50 UG/ML
12.5 INJECTION INTRAVENOUS ONCE
Qty: 0 | Refills: 0 | Status: DISCONTINUED | OUTPATIENT
Start: 2018-03-21 | End: 2018-03-21

## 2018-03-21 RX ADMIN — DORZOLAMIDE HYDROCHLORIDE TIMOLOL MALEATE 1 DROP(S): 20; 5 SOLUTION/ DROPS OPHTHALMIC at 05:01

## 2018-03-21 RX ADMIN — Medication 500 MILLIGRAM(S): at 14:26

## 2018-03-21 RX ADMIN — Medication 1: at 08:30

## 2018-03-21 RX ADMIN — SENNA PLUS 2 TABLET(S): 8.6 TABLET ORAL at 22:33

## 2018-03-21 RX ADMIN — FENTANYL CITRATE 12.5 MICROGRAM(S): 50 INJECTION INTRAVENOUS at 13:30

## 2018-03-21 RX ADMIN — Medication 1 DROP(S): at 22:34

## 2018-03-21 RX ADMIN — INSULIN GLARGINE 3 UNIT(S): 100 INJECTION, SOLUTION SUBCUTANEOUS at 22:41

## 2018-03-21 RX ADMIN — PANTOPRAZOLE SODIUM 40 MILLIGRAM(S): 20 TABLET, DELAYED RELEASE ORAL at 09:06

## 2018-03-21 RX ADMIN — Medication 1: at 16:59

## 2018-03-21 RX ADMIN — DORZOLAMIDE HYDROCHLORIDE TIMOLOL MALEATE 1 DROP(S): 20; 5 SOLUTION/ DROPS OPHTHALMIC at 17:05

## 2018-03-21 RX ADMIN — Medication 1 DROP(S): at 14:26

## 2018-03-21 RX ADMIN — Medication 1: at 22:42

## 2018-03-21 RX ADMIN — Medication 100 MILLIGRAM(S): at 09:05

## 2018-03-21 RX ADMIN — ATORVASTATIN CALCIUM 40 MILLIGRAM(S): 80 TABLET, FILM COATED ORAL at 22:33

## 2018-03-21 RX ADMIN — PIPERACILLIN AND TAZOBACTAM 25 GRAM(S): 4; .5 INJECTION, POWDER, LYOPHILIZED, FOR SOLUTION INTRAVENOUS at 05:01

## 2018-03-21 RX ADMIN — Medication 81 MILLIGRAM(S): at 09:05

## 2018-03-21 RX ADMIN — TAMSULOSIN HYDROCHLORIDE 0.4 MILLIGRAM(S): 0.4 CAPSULE ORAL at 22:33

## 2018-03-21 RX ADMIN — Medication 325 MILLIGRAM(S): at 14:26

## 2018-03-21 RX ADMIN — PANTOPRAZOLE SODIUM 40 MILLIGRAM(S): 20 TABLET, DELAYED RELEASE ORAL at 17:06

## 2018-03-21 RX ADMIN — LOTEPREDNOL ETABONATE 1 DROP(S): 2 SUSPENSION/ DROPS OPHTHALMIC at 17:07

## 2018-03-21 RX ADMIN — Medication 100 MILLIGRAM(S): at 17:07

## 2018-03-21 RX ADMIN — BRIMONIDINE TARTRATE 1 DROP(S): 2 SOLUTION/ DROPS OPHTHALMIC at 05:01

## 2018-03-21 RX ADMIN — CARVEDILOL PHOSPHATE 12.5 MILLIGRAM(S): 80 CAPSULE, EXTENDED RELEASE ORAL at 09:06

## 2018-03-21 RX ADMIN — Medication 1 DROP(S): at 05:01

## 2018-03-21 RX ADMIN — BACITRACIN 1 APPLICATION(S): 500 OINTMENT OPHTHALMIC at 13:22

## 2018-03-21 RX ADMIN — FENTANYL CITRATE 12.5 MICROGRAM(S): 50 INJECTION INTRAVENOUS at 14:25

## 2018-03-21 RX ADMIN — Medication 150 MILLIGRAM(S): at 14:26

## 2018-03-21 RX ADMIN — HEPARIN SODIUM 5000 UNIT(S): 5000 INJECTION INTRAVENOUS; SUBCUTANEOUS at 17:05

## 2018-03-21 RX ADMIN — CARVEDILOL PHOSPHATE 6.25 MILLIGRAM(S): 80 CAPSULE, EXTENDED RELEASE ORAL at 17:05

## 2018-03-21 RX ADMIN — PIPERACILLIN AND TAZOBACTAM 25 GRAM(S): 4; .5 INJECTION, POWDER, LYOPHILIZED, FOR SOLUTION INTRAVENOUS at 14:35

## 2018-03-21 RX ADMIN — BRIMONIDINE TARTRATE 1 DROP(S): 2 SOLUTION/ DROPS OPHTHALMIC at 17:05

## 2018-03-21 RX ADMIN — CLOPIDOGREL BISULFATE 75 MILLIGRAM(S): 75 TABLET, FILM COATED ORAL at 09:05

## 2018-03-21 NOTE — PROGRESS NOTE ADULT - PROBLEM SELECTOR PLAN 2
Patient's BP over past few days has decreased from nearly 180s SBP to 110s. No complaints from patient and patient is not orthostatic. Will monitor for now, currently normotensive        - c/w Carvedilol to 6.25 BID        - Consider restarting HCTZ at 12.5 or nifedipine if requires additional blood pressure control, currently more hypotensive        - Continue to hold ARB and spironolactone in setting of PAULIE        - Will still need further medical reconciliation regarding home medications (in outpatient medication review on spironolactone, hydrochlorothiazide, valsartan and nifedipine, however valsartan-HCTZ and a few other of his medications may have been discontinued per paperwork from previous hospitalization). Patient's BP over past few days has decreased from nearly 180s SBP to 110s. No complaints from patient and patient is not orthostatic. Will monitor for now, currently normotensive  - c/w Carvedilol to 6.25 BID  - Consider restarting HCTZ at 12.5 or nifedipine if requires additional blood pressure control, currently more hypotensive  - Continue to hold ARB and spironolactone in setting of PAULIE  - Will still need further medical reconciliation regarding home medications (in outpatient medication review on spironolactone, hydrochlorothiazide, valsartan and nifedipine, however valsartan-HCTZ and a few other of his medications may have been discontinued per paperwork from previous hospitalization). Patient's BP over past few days has decreased from nearly 180s SBP to 110s. No complaints from patient and patient is not orthostatic.  - c/w Carvedilol to 6.25 BID  - Consider restarting HCTZ at 12.5 or nifedipine if requires additional blood pressure control, currently more hypotensive  - Continue to hold ARB and spironolactone in setting of PAULIE Patient's BP over past few days has decreased from nearly 180s SBP to 110s. No complaints from patient and patient is not orthostatic.  - c/w Carvedilol to 3.125 BID  - Consider restarting HCTZ at 12.5 or nifedipine if requires additional blood pressure control, currently more hypotensive  - Continue to hold ARB and spironolactone in setting of PAULIE

## 2018-03-21 NOTE — PROGRESS NOTE ADULT - PROBLEM SELECTOR PLAN 1
BNP 5000s showing improvement, lungs clear, breathing on RA.        - Continue lasix 40 po daily        - Strict I/Os and daily weights. Net even today        - Continue PRN duonebs        - Patient is currently on room air, with moderate R loculated pleural effusion, may be 2/2 HF but less likely if only R sided. Pleural fluid analysis meets Light's criteria for exudative, but serum:pleural albumin gradient is >1.2 and serum:pleural protein gradient is >3.1 is consistent with chronic CHF per pulmonology BNP 5000s showing improvement, lungs clear, breathing on RA.  - Continue lasix 40 po daily  - Strict I/Os and daily weights. Net even today  - Continue PRN duonebs  - Patient is currently on room air, with moderate R loculated pleural effusion, may be 2/2 HF but less likely if only R sided. Pleural fluid analysis meets Light's criteria for exudative, but serum:pleural albumin gradient is >1.2 and serum:pleural protein gradient is >3.1 is consistent with chronic CHF per pulmonology BNP 5000s showing improvement, lungs clear, breathing on RA.  - Continue lasix 40 po daily  - Strict I/Os and daily weights. Net even today  - Continue PRN duonebs  - Patient is currently on room air, moderate R loculated pleural effusion s/p thoracentesis 3/19 likely 2/2 HF but is only R sided. Pleural fluid analysis meets Light's criteria for exudative, but serum:pleural albumin gradient is >1.2 and serum:pleural protein gradient is >3.1 is consistent with chronic CHF per pulmonology

## 2018-03-21 NOTE — PROCEDURE NOTE - NSURITECHNIQUE_GU_A_CORE
Sterile gloves were worn for the duration of the procedure/A sterile drape was used to cover all adjacent areas/The collection bag is below the level of the patient and urinary bladder/The catheter was appropriately lubricated/Proper hand hygiene was performed/The site was cleaned with soap/water and sterile solution (betadine)/The catheter was secured with a securement device (e.g. StatLock)/The urinary drainage system is closed at the end of the procedure/All applicable medical record documentation is completed

## 2018-03-21 NOTE — PROGRESS NOTE ADULT - PROBLEM SELECTOR PLAN 3
Moderate loculated pleural effusion s/p thoracentesis 3/19. 600cc serous fluid removed. Pleural fluid positive for light's criteria, but serum:pleural albumin gradient is >1.2 and serum:pleural protein gradient is >3.1    -f/u fluid cytology, cultures    -Meet's light's criteria Moderate loculated pleural effusion s/p thoracentesis 3/19. 600cc serous fluid removed. Pleural fluid positive for light's criteria, but serum:pleural albumin gradient is >1.2 and serum:pleural protein gradient is >3.1  -f/u fluid cytology, cultures  -Meet's light's criteria Moderate loculated pleural effusion s/p thoracentesis 3/19. 600cc serous fluid removed. Pleural fluid positive for Light's criteria, but serum:pleural albumin gradient is >1.2 and serum:pleural protein gradient is >3.1  -f/u fluid cytology, cultures  -Treating for aspiration pneumonia 5d course vanc/zosyn (3/19 - ), switch to augmentin Moderate loculated pleural effusion s/p thoracentesis 3/19. 600cc serous fluid removed. Pleural fluid positive for Light's criteria, but serum:pleural albumin gradient is >1.2 and serum:pleural protein gradient is >3.1  -f/u fluid cytology, cultures  Aspiration pneumonia: Treating for aspiration pneumonia 5d course vanc/zosyn (3/19 - ), switch to augmentin  -empirically treat for aspiration due to pt needing intubation for airway protection and leukocytosis

## 2018-03-21 NOTE — PROGRESS NOTE ADULT - PROBLEM SELECTOR PLAN 1
- currently SR with V-paced at rate 80s, Afib/ flutter till 8: 30 am (CHADSVasc Score 7)  - cleared for using anticoagulation by GI   -   -Await RADHA once patient is able to assess for candidacy of Watchman - currently SR with V-paced at rate 80s, Afib/ flutter till 8: 30 am (CHADSVasc Score 7)  - would recommend AC (CHADSVasc Score 7), as cleared by GI  - NPO post MN for RADHA tomorrow (Pulmonary cleared for the procedure as per Team 3) to assess for candidacy of Watchman   - EP will follow     # [id number]

## 2018-03-21 NOTE — PROGRESS NOTE ADULT - PROBLEM SELECTOR PLAN 4
CHADSVasc Score 7. Rate well controlled AV paced 60s-90s        - Per GI, no scope this admission, no contraindication to DAPT or coumadin        -House EP consulted whether watchman        -Per OSH endoscopy and colonoscopy reports show patient initially presented with Hg 5.4 requiring urgent EGD at previous hospitalization which was notable for hiatal hernia, duodenal ulcer and duodenitis. Colonoscopy was also performed at that time showing 8 mm sessile polyp. (Ie procedure preceded bleeding event rather than as a complication). CHADSVasc Score 7. Rate well controlled AV paced 60s-90s  - Per GI, no scope this admission, no contraindication to DAPT or coumadin  -House EP consulted whether watchman  -Per OSH endoscopy and colonoscopy reports show patient initially presented with Hg 5.4 requiring urgent EGD at previous hospitalization which was notable for hiatal hernia, duodenal ulcer and duodenitis. Colonoscopy was also performed at that time showing 8 mm sessile polyp. (Ie procedure preceded bleeding event rather than as a complication).

## 2018-03-21 NOTE — PROCEDURE NOTE - NSINDICATIONS_GEN_A_CORE
altered anatomy/urinry obstruction or retention/difficult fernández placement by RN due to severe hypospadias and BPH
pleural effusion

## 2018-03-21 NOTE — PROGRESS NOTE ADULT - ASSESSMENT
82 year old Greek speaking Male with PMHx significant for CAD, TIA, DM Type 2, HTN, AFib (Not on A/C),COPD,  R endarterectomy (7/2017) c/b bleed, partial SBO, admission 10/2017 with septic shock and respiratory failure c/ chest tube and pleurodesis, admitted Jan 2018 for GIB/ anemia s/p emergent EGD (hg of 5.4) with evidence duodenal ulcers/duodenitis/sessile duodenal polyp w/ elevated troponins, renal failure, CXR concerning for persistent RLL process and effusion, paroxysmal Afib/flutter presenting from OSH as CCU transfer for ADHF in setting of ?hypertensive emergency c/b aflutter with Complete Heart Block and polymorphic VT now s/p ICD on 3/16, s/p thoracentesis on 3/20 and on IV Abx for possible Aspiration pneumonia. S/p LHC on 3/15 with 85% stenosis of proximal cx and 100% chronic lesion of pRCA, s/p PCI to pCirc on 3/21. Now, cleared for anticoagulation by GI.

## 2018-03-21 NOTE — PROCEDURE NOTE - NSPROCDETAILS_GEN_ALL_CORE
sterile technique, indwelling urinary device inserted
connection to syringe/location identified, draped/prepped, sterile technique used, needle inserted/introduced/catheter inserted over needle

## 2018-03-21 NOTE — PROGRESS NOTE ADULT - PROBLEM SELECTOR PLAN 6
CT chest found 1cm ZORAIDA nodule, significant smoking hx        -f/u outpatient repeat CT chest in 2-3 months        COPD: baldo, prn, will need outpatient PFTs        Aspiration pneumonia: will empirically treat for aspiration due to pt needing intubation for airway protection and leukocytosis    -c/w vanc and zosyn (3/19 - ) for 5d course, can d/c on doxycycline    -to comlplete 5d course for possible PNA CT chest found 1cm ZORAIDA nodule, significant smoking hx  -f/u outpatient repeat CT chest in 2-3 months  COPD: baldo, massimo, will need outpatient PFTs  Aspiration pneumonia: will empirically treat for aspiration due to pt needing intubation for airway protection and leukocytosis  -c/w vanc and zosyn (3/19 - ) for 5d course, can d/c on doxycycline  -to comlplete 5d course for possible PNA CT chest found 1cm ZORAIDA nodule, significant smoking hx  -f/u outpatient repeat CT chest in 2-3 months  COPD: massimo bland, will need outpatient PFTs

## 2018-03-21 NOTE — PROGRESS NOTE ADULT - PROBLEM SELECTOR PLAN 10
Problem: Prophylactic measure. Plan; DVT ppx: SCDs  Bowel: bowel regimen  Diet: DASH  11) Acute nonintractable headache  - Patient denies c/o headache currently and no focal neuro deficits  - If persistent or new neuro deficits consider CT head. Not associated with hypertension at time of headache.   - Tylenol PRN  - Hold off on CT head at this time. Problem: Prophylactic measure. Plan; DVT ppx: SCDs  Bowel: bowel regimen  Diet: DASH

## 2018-03-21 NOTE — CHART NOTE - NSCHARTNOTEFT_GEN_A_CORE
Removal of Femoral Sheath    Pulses in the right lower extremity are palpable. The patient was placed in the supine position. The insertion site was identified and the sutures were removed per protocol.  The ____ [place] femoral sheath was then removed. Direct pressure was applied for  __20____ minutes.     Monitoring of the right groin and both lower extremities including neuro-vascular checks and vital signs every 15 minutes x 4, then every 30 minutes x 2, then every 1 hour was ordered.    Complications: None  Comments:  Activity restriction and reportable symptoms discussed with patients wife who will remain at bedside

## 2018-03-21 NOTE — PROGRESS NOTE ADULT - PROBLEM SELECTOR PLAN 7
Per cardiology, will pursue angioplasty of 85% stenosis of proximal circumflex as an outpatient if can tolerate longterm DAPT    - Continue high intensity statin, Plavix, star ASA Per cardiology, will pursue angioplasty of 85% stenosis of proximal circumflex as an outpatient if can tolerate longterm DAPT  - Continue high intensity statin, Plavix, star ASA Per cardiology, will pursue angioplasty of 85% stenosis of proximal circumflex as an outpatient if can tolerate longterm DAPT  -Heart cath today s/p PCI FARRAH x 1 to pLCx 80% via RFA access  - Continue high intensity statin, Plavix, ASA

## 2018-03-21 NOTE — PROGRESS NOTE ADULT - PROBLEM SELECTOR PLAN 5
Please see physical chart for review of OSH records        - c/w PPI BID        - f/u H pylori stool antigen and antibody (no evidence of H pylori on pathology report however)    - iron studies more consistent with anemia of chronic disease. Hb is stable    -no further scoping per GI Please see physical chart for review of OSH records  - c/w PPI BID  - f/u H pylori stool antigen and antibody (no evidence of H pylori on pathology report however)  - iron studies more consistent with anemia of chronic disease. Hb is stable  -no further scoping per GI

## 2018-03-21 NOTE — PROGRESS NOTE ADULT - SUBJECTIVE AND OBJECTIVE BOX
Patient is a 82y old  Male who presents with a chief complaint of Shortness of breath (13 Mar 2018 23:00)    Shiela Thomas, PGY1  Internal Medicine team 3  Pager [phone] / [phone]    SUBJECTIVE / OVERNIGHT EVENTS:   past 24h. On tele, pt had a 30 sec run of AIVR that transitioned to VT, otherwise V-paced 59-60s. Pt has no complaints. Denies CP, SOB, pain.    MEDICATIONS  (STANDING):  artificial  tears Solution 1 Drop(s) Both EYES three times a day  ascorbic acid 500 milliGRAM(s) Oral daily  atorvastatin 40 milliGRAM(s) Oral at bedtime  BACItracin   Ophthalmic Ointment 1 Application(s) Right EYE daily  brimonidine 0.2% Ophthalmic Solution 1 Drop(s) Right EYE two times a day  carvedilol 6.25 milliGRAM(s) Oral every 12 hours  clopidogrel Tablet 75 milliGRAM(s) Oral daily  dextrose 5%. 1000 milliLiter(s) (50 mL/Hr) IV Continuous <Continuous>  dextrose 50% Injectable 12.5 Gram(s) IV Push once  dextrose 50% Injectable 25 Gram(s) IV Push once  dextrose 50% Injectable 25 Gram(s) IV Push once  docusate sodium 100 milliGRAM(s) Oral two times a day  dorzolamide 2%/timolol 0.5% Ophthalmic Solution 1 Drop(s) Both EYES two times a day  ferrous    sulfate 325 milliGRAM(s) Oral daily  furosemide    Tablet 40 milliGRAM(s) Oral daily  heparin  Injectable 5000 Unit(s) SubCutaneous every 12 hours  insulin lispro (HumaLOG) corrective regimen sliding scale   SubCutaneous at bedtime  insulin lispro (HumaLOG) corrective regimen sliding scale   SubCutaneous three times a day before meals  lactulose Syrup 10 Gram(s) Oral daily  loteprednol 0.5% Ophthalmic Suspension 1 Drop(s) Both EYES two times a day  pantoprazole    Tablet 40 milliGRAM(s) Oral before breakfast  piperacillin/tazobactam IVPB. 3.375 Gram(s) IV Intermittent every 8 hours  polyethylene glycol 3350 17 Gram(s) Oral daily  senna 2 Tablet(s) Oral at bedtime  sodium chloride 0.9%. 1000 milliLiter(s) (50 mL/Hr) IV Continuous <Continuous>  tamsulosin 0.4 milliGRAM(s) Oral daily  vancomycin  IVPB 750 milliGRAM(s) IV Intermittent every 12 hours    MEDICATIONS  (PRN):  acetaminophen   Tablet. 650 milliGRAM(s) Oral every 6 hours PRN Mild and/or moderate and/or severe pain  ALBUTerol/ipratropium for Nebulization 3 milliLiter(s) Nebulizer every 6 hours PRN Shortness of Breath and/or Wheezing  dextrose Gel 1 Dose(s) Oral once PRN Blood Glucose LESS THAN 70 milliGRAM(s)/deciliter  glucagon  Injectable 1 milliGRAM(s) IntraMuscular once PRN Glucose LESS THAN 70 milligrams/deciliter      Vital Signs Last 24 Hrs  T(C): 36.9 (20 Mar 2018 04:12), Max: 36.9 (20 Mar 2018 04:12)  T(F): 98.5 (20 Mar 2018 04:12), Max: 98.5 (20 Mar 2018 04:12)  HR: 60 (20 Mar 2018 04:12) (59 - 69)  BP: 131/71 (20 Mar 2018 04:12) (121/62 - 161/73)  BP(mean): --  RR: 18 (20 Mar 2018 04:12) (18 - 18)  SpO2: 93% (20 Mar 2018 04:12) (93% - 98%)    CAPILLARY BLOOD GLUCOSE      POCT Blood Glucose.: 271 mg/dL (19 Mar 2018 21:50)  POCT Blood Glucose.: 178 mg/dL (19 Mar 2018 16:33)  POCT Blood Glucose.: 330 mg/dL (19 Mar 2018 11:24)  POCT Blood Glucose.: 167 mg/dL (19 Mar 2018 07:25)      I&O's Summary    19 Mar 2018 07:01  -  20 Mar 2018 07:00  --------------------------------------------------------  IN: 1495 mL / OUT: 2000 mL / NET: -505 mL     NET: -880 mL      PHYSICAL EXAM:  GENERAL: NAD, well-developed  HEAD:  Atraumatic  EYES: EOMI, PERRL, decreased vision b/l at baseline. +conjunctival injection chronic per wife  NECK: Supple, No JVD  CHEST/LUNG: faint LLL rales. No wheezes or rhonchi. Poor inspiratory effort  HEART: Regular rate and rhythm; No murmurs, rubs, or gallops  ABDOMEN: Soft, Nontender, Nondistended; Bowel sounds present  EXTREMITIES:  2+ Peripheral Pulses, No clubbing, cyanosis, or edema  NEUROLOGY: A&O x3  SKIN: No rashes or lesions    LABS:                        11.0   10.8  )-----------( 383      ( 19 Mar 2018 09:36 )             35.4     CBC Full  -  ( 19 Mar 2018 09:36 )  WBC Count : 10.8 K/uL  Hemoglobin : 11.0 g/dL  Hematocrit : 35.4 %  Platelet Count - Automated : 383 K/uL  Mean Cell Volume : 85.2 fl  Mean Cell Hemoglobin : 26.4 pg  Mean Cell Hemoglobin Concentration : 30.9 gm/dL  Auto Neutrophil # : x  Auto Lymphocyte # : x  Auto Monocyte # : x  Auto Eosinophil # : x  Auto Basophil # : x  Auto Neutrophil % : x  Auto Lymphocyte % : x  Auto Monocyte % : x  Auto Eosinophil % : x  Auto Basophil % : x    03-20    139  |  103  |  40<H>  ----------------------------<  182<H>  4.2   |  23  |  1.38<H>    Ca    9.3      20 Mar 2018 06:29  Phos  2.8     03-20  Mg     2.3     03-20      Creatinine Trend: 1.38<--, 1.41<--, 1.60<--, 1.41<--, 1.36<--, 1.43<--        MICROBIOLOGY:    RADIOLOGY & ADDITIONAL TESTS:  < from: Limited Transthoracic Echo (03.18.18 @ 08:53) >  CONCLUSIONS:  1. Endocardium not well visualized; grossly normal left  ventricular systolic function.  2. No pericardial effusion seen.    < end of copied text >      Imaging Personally Reviewed: CT chest    Consultant(s) Notes Reviewed:  EP    Care Discussed with Consultants/Other Providers: EP Patient is a 82y old  Male who presents with a chief complaint of Shortness of breath (13 Mar 2018 23:00)    Shiela Thomas, PGY1  Internal Medicine team 3  Pager [phone]    SUBJECTIVE / OVERNIGHT EVENTS:   past 24h. On tele, pt had a 30 sec run of AIVR that transitioned to VT, otherwise V-paced 59-60s. Pt has no complaints. Denies CP, SOB, pain. EP Dr. Coker could lie pt flat last night, she would like to hold lasix until after cath today, inc coreg to 12.5 q12.    MEDICATIONS  (STANDING):  artificial  tears Solution 1 Drop(s) Both EYES three times a day  ascorbic acid 500 milliGRAM(s) Oral daily  atorvastatin 40 milliGRAM(s) Oral at bedtime  BACItracin   Ophthalmic Ointment 1 Application(s) Right EYE daily  brimonidine 0.2% Ophthalmic Solution 1 Drop(s) Right EYE two times a day  carvedilol 6.25 milliGRAM(s) Oral every 12 hours  clopidogrel Tablet 75 milliGRAM(s) Oral daily  dextrose 5%. 1000 milliLiter(s) (50 mL/Hr) IV Continuous <Continuous>  dextrose 50% Injectable 12.5 Gram(s) IV Push once  dextrose 50% Injectable 25 Gram(s) IV Push once  dextrose 50% Injectable 25 Gram(s) IV Push once  docusate sodium 100 milliGRAM(s) Oral two times a day  dorzolamide 2%/timolol 0.5% Ophthalmic Solution 1 Drop(s) Both EYES two times a day  ferrous    sulfate 325 milliGRAM(s) Oral daily  furosemide    Tablet 40 milliGRAM(s) Oral daily  heparin  Injectable 5000 Unit(s) SubCutaneous every 12 hours  insulin lispro (HumaLOG) corrective regimen sliding scale   SubCutaneous at bedtime  insulin lispro (HumaLOG) corrective regimen sliding scale   SubCutaneous three times a day before meals  lactulose Syrup 10 Gram(s) Oral daily  loteprednol 0.5% Ophthalmic Suspension 1 Drop(s) Both EYES two times a day  pantoprazole    Tablet 40 milliGRAM(s) Oral before breakfast  piperacillin/tazobactam IVPB. 3.375 Gram(s) IV Intermittent every 8 hours  polyethylene glycol 3350 17 Gram(s) Oral daily  senna 2 Tablet(s) Oral at bedtime  sodium chloride 0.9%. 1000 milliLiter(s) (50 mL/Hr) IV Continuous <Continuous>  tamsulosin 0.4 milliGRAM(s) Oral daily  vancomycin  IVPB 750 milliGRAM(s) IV Intermittent every 12 hours    MEDICATIONS  (PRN):  acetaminophen   Tablet. 650 milliGRAM(s) Oral every 6 hours PRN Mild and/or moderate and/or severe pain  ALBUTerol/ipratropium for Nebulization 3 milliLiter(s) Nebulizer every 6 hours PRN Shortness of Breath and/or Wheezing  dextrose Gel 1 Dose(s) Oral once PRN Blood Glucose LESS THAN 70 milliGRAM(s)/deciliter  glucagon  Injectable 1 milliGRAM(s) IntraMuscular once PRN Glucose LESS THAN 70 milligrams/deciliter      Vital Signs Last 24 Hrs  T(C): 36.9 (20 Mar 2018 04:12), Max: 36.9 (20 Mar 2018 04:12)  T(F): 98.5 (20 Mar 2018 04:12), Max: 98.5 (20 Mar 2018 04:12)  HR: 60 (20 Mar 2018 04:12) (59 - 69)  BP: 131/71 (20 Mar 2018 04:12) (121/62 - 161/73)  BP(mean): --  RR: 18 (20 Mar 2018 04:12) (18 - 18)  SpO2: 93% (20 Mar 2018 04:12) (93% - 98%)    CAPILLARY BLOOD GLUCOSE      POCT Blood Glucose.: 271 mg/dL (19 Mar 2018 21:50)  POCT Blood Glucose.: 178 mg/dL (19 Mar 2018 16:33)  POCT Blood Glucose.: 330 mg/dL (19 Mar 2018 11:24)  POCT Blood Glucose.: 167 mg/dL (19 Mar 2018 07:25)      I&O's Summary    19 Mar 2018 07:01  -  20 Mar 2018 07:00  --------------------------------------------------------  IN: 1495 mL / OUT: 2000 mL / NET: -505 mL     NET: -880 mL      PHYSICAL EXAM:  GENERAL: NAD, well-developed  HEAD:  Atraumatic  EYES: EOMI, PERRL, decreased vision b/l at baseline. +conjunctival injection chronic per wife  NECK: Supple, No JVD  CHEST/LUNG: faint LLL rales. No wheezes or rhonchi. Poor inspiratory effort  HEART: Regular rate and rhythm; No murmurs, rubs, or gallops  ABDOMEN: Soft, Nontender, Nondistended; Bowel sounds present  EXTREMITIES:  2+ Peripheral Pulses, No clubbing, cyanosis, or edema  NEUROLOGY: A&O x3  SKIN: No rashes or lesions    LABS:                        11.0   10.8  )-----------( 383      ( 19 Mar 2018 09:36 )             35.4     CBC Full  -  ( 19 Mar 2018 09:36 )  WBC Count : 10.8 K/uL  Hemoglobin : 11.0 g/dL  Hematocrit : 35.4 %  Platelet Count - Automated : 383 K/uL  Mean Cell Volume : 85.2 fl  Mean Cell Hemoglobin : 26.4 pg  Mean Cell Hemoglobin Concentration : 30.9 gm/dL  Auto Neutrophil # : x  Auto Lymphocyte # : x  Auto Monocyte # : x  Auto Eosinophil # : x  Auto Basophil # : x  Auto Neutrophil % : x  Auto Lymphocyte % : x  Auto Monocyte % : x  Auto Eosinophil % : x  Auto Basophil % : x    03-20    139  |  103  |  40<H>  ----------------------------<  182<H>  4.2   |  23  |  1.38<H>    Ca    9.3      20 Mar 2018 06:29  Phos  2.8     03-20  Mg     2.3     03-20      Creatinine Trend: 1.38<--, 1.41<--, 1.60<--, 1.41<--, 1.36<--, 1.43<--        MICROBIOLOGY:    RADIOLOGY & ADDITIONAL TESTS:  < from: Limited Transthoracic Echo (03.18.18 @ 08:53) >  CONCLUSIONS:  1. Endocardium not well visualized; grossly normal left  ventricular systolic function.  2. No pericardial effusion seen.    < end of copied text >      Imaging Personally Reviewed: CT chest    Consultant(s) Notes Reviewed:  EP    Care Discussed with Consultants/Other Providers: EP Patient is a 82y old  Male who presents with a chief complaint of Shortness of breath (13 Mar 2018 23:00)    Shiela Thomas, PGY1  Internal Medicine team 3  Pager [phone] / [phone]    SUBJECTIVE / OVERNIGHT EVENTS:   past 24h. On tele, pt had a 30 sec run of AIVR that transitioned to VT, otherwise V-paced 59-60s. Pt has no complaints. Denies CP, SOB, pain. EP Dr. Coker could lie pt flat last night, she would like to hold lasix until after cath today, inc coreg to 12.5 q12. Pt denies CP, palpitations SOB.    MEDICATIONS  (STANDING):  artificial  tears Solution 1 Drop(s) Both EYES three times a day  ascorbic acid 500 milliGRAM(s) Oral daily  atorvastatin 40 milliGRAM(s) Oral at bedtime  BACItracin   Ophthalmic Ointment 1 Application(s) Right EYE daily  brimonidine 0.2% Ophthalmic Solution 1 Drop(s) Right EYE two times a day  carvedilol 6.25 milliGRAM(s) Oral every 12 hours  clopidogrel Tablet 75 milliGRAM(s) Oral daily  dextrose 5%. 1000 milliLiter(s) (50 mL/Hr) IV Continuous <Continuous>  dextrose 50% Injectable 12.5 Gram(s) IV Push once  dextrose 50% Injectable 25 Gram(s) IV Push once  dextrose 50% Injectable 25 Gram(s) IV Push once  docusate sodium 100 milliGRAM(s) Oral two times a day  dorzolamide 2%/timolol 0.5% Ophthalmic Solution 1 Drop(s) Both EYES two times a day  ferrous    sulfate 325 milliGRAM(s) Oral daily  furosemide    Tablet 40 milliGRAM(s) Oral daily  heparin  Injectable 5000 Unit(s) SubCutaneous every 12 hours  insulin lispro (HumaLOG) corrective regimen sliding scale   SubCutaneous at bedtime  insulin lispro (HumaLOG) corrective regimen sliding scale   SubCutaneous three times a day before meals  lactulose Syrup 10 Gram(s) Oral daily  loteprednol 0.5% Ophthalmic Suspension 1 Drop(s) Both EYES two times a day  pantoprazole    Tablet 40 milliGRAM(s) Oral before breakfast  piperacillin/tazobactam IVPB. 3.375 Gram(s) IV Intermittent every 8 hours  polyethylene glycol 3350 17 Gram(s) Oral daily  senna 2 Tablet(s) Oral at bedtime  sodium chloride 0.9%. 1000 milliLiter(s) (50 mL/Hr) IV Continuous <Continuous>  tamsulosin 0.4 milliGRAM(s) Oral daily  vancomycin  IVPB 750 milliGRAM(s) IV Intermittent every 12 hours    MEDICATIONS  (PRN):  acetaminophen   Tablet. 650 milliGRAM(s) Oral every 6 hours PRN Mild and/or moderate and/or severe pain  ALBUTerol/ipratropium for Nebulization 3 milliLiter(s) Nebulizer every 6 hours PRN Shortness of Breath and/or Wheezing  dextrose Gel 1 Dose(s) Oral once PRN Blood Glucose LESS THAN 70 milliGRAM(s)/deciliter  glucagon  Injectable 1 milliGRAM(s) IntraMuscular once PRN Glucose LESS THAN 70 milligrams/deciliter      Vital Signs Last 24 Hrs  T(C): 36.9 (20 Mar 2018 04:12), Max: 36.9 (20 Mar 2018 04:12)  T(F): 98.5 (20 Mar 2018 04:12), Max: 98.5 (20 Mar 2018 04:12)  HR: 60 (20 Mar 2018 04:12) (59 - 69)  BP: 131/71 (20 Mar 2018 04:12) (121/62 - 161/73)  BP(mean): --  RR: 18 (20 Mar 2018 04:12) (18 - 18)  SpO2: 93% (20 Mar 2018 04:12) (93% - 98%)    CAPILLARY BLOOD GLUCOSE      POCT Blood Glucose.: 271 mg/dL (19 Mar 2018 21:50)  POCT Blood Glucose.: 178 mg/dL (19 Mar 2018 16:33)  POCT Blood Glucose.: 330 mg/dL (19 Mar 2018 11:24)  POCT Blood Glucose.: 167 mg/dL (19 Mar 2018 07:25)      I&O's Summary    19 Mar 2018 07:01  -  20 Mar 2018 07:00  --------------------------------------------------------  IN: 1495 mL / OUT: 2000 mL / NET: -505 mL     NET: -880 mL      PHYSICAL EXAM:  GENERAL: NAD, well-developed  HEAD:  Atraumatic  EYES: EOMI, PERRL, decreased vision b/l at baseline. Conjunctivae clear  NECK: Supple, No JVD  CHEST/LUNG: faint LLL rales. No wheezes or rhonchi. Poor inspiratory effort  HEART: Regular rate and rhythm; No murmurs, rubs, or gallops  ABDOMEN: Soft, Nontender, Nondistended; Bowel sounds present  EXTREMITIES:  2+ Peripheral Pulses, No clubbing, cyanosis, or edema  NEUROLOGY: A&O x3  SKIN: No rashes or lesions    LABS:                        10.2   11.31 )-----------( 415      ( 21 Mar 2018 07:31 )             34.7     CBC Full  -  ( 21 Mar 2018 07:31 )  WBC Count : 11.31 K/uL  Hemoglobin : 10.2 g/dL  Hematocrit : 34.7 %  Platelet Count - Automated : 415 K/uL  Mean Cell Volume : 84.6 fl  Mean Cell Hemoglobin : 24.9 pg  Mean Cell Hemoglobin Concentration : 29.4 gm/dL  Auto Neutrophil # : x  Auto Lymphocyte # : x  Auto Monocyte # : x  Auto Eosinophil # : x  Auto Basophil # : x  Auto Neutrophil % : x  Auto Lymphocyte % : x  Auto Monocyte % : x  Auto Eosinophil % : x  Auto Basophil % : x    03-21    143  |  107  |  36<H>  ----------------------------<  156<H>  4.4   |  26  |  1.44<H>    Ca    9.4      21 Mar 2018 06:11  Phos  3.2     03-20  Mg     2.2     03-21    TPro  6.9  /  Alb  3.1<L>  /  TBili  0.4  /  DBili  0.1  /  AST  53<H>  /  ALT  51<H>  /  AlkPhos  138<H>  03-20    Creatinine Trend: 1.44<--, 1.52<--, 1.38<--, 1.41<--, 1.60<--, 1.41<--  LIVER FUNCTIONS - ( 20 Mar 2018 06:29 )  Alb: 3.1 g/dL / Pro: 6.9 g/dL / ALK PHOS: 138 U/L / ALT: 51 U/L RC / AST: 53 U/L / GGT: x           PT/INR - ( 21 Mar 2018 07:31 )   PT: 12.6 sec;   INR: 1.11 ratio         PTT - ( 21 Mar 2018 07:31 )  PTT:30.1 sec      MICROBIOLOGY:    Culture - Fungal, Body Fluid (collected 20 Mar 2018 00:47)  Source: .Body Fluid Pleural Fluid  Preliminary Report (21 Mar 2018 07:11):    Testing in progress    Culture - Body Fluid with Gram Stain (collected 20 Mar 2018 00:47)  Source: .Body Fluid Pleural Fluid  Preliminary Report (20 Mar 2018 18:49):    No growth to date.    Culture - Acid Fast - Body Fluid w/Smear (collected 20 Mar 2018 00:47)  Source: .Body Fluid Pleural Fluid      RADIOLOGY & ADDITIONAL TESTS:      Imaging Personally Reviewed:     Consultant(s) Notes Reviewed:  EP    Care Discussed with Consultants/Other Providers: EP Patient is a 82y old  Male who presents with a chief complaint of Shortness of breath (13 Mar 2018 23:00)    Shiela Thomas, PGY1  Internal Medicine team 3  Pager [phone]    SUBJECTIVE / OVERNIGHT EVENTS:   past 24h. On tele, pt had a 30 sec run of AIVR that transitioned to VT, otherwise V-paced 59-60s. Pt has no complaints. Denies CP, SOB, pain. EP Dr. Coker could lie pt flat last night, she would like to hold lasix until after cath today, inc coreg to 12.5 q12. Pt denies CP, palpitations SOB.    MEDICATIONS  (STANDING):  artificial  tears Solution 1 Drop(s) Both EYES three times a day  ascorbic acid 500 milliGRAM(s) Oral daily  atorvastatin 40 milliGRAM(s) Oral at bedtime  BACItracin   Ophthalmic Ointment 1 Application(s) Right EYE daily  brimonidine 0.2% Ophthalmic Solution 1 Drop(s) Right EYE two times a day  carvedilol 6.25 milliGRAM(s) Oral every 12 hours  clopidogrel Tablet 75 milliGRAM(s) Oral daily  dextrose 5%. 1000 milliLiter(s) (50 mL/Hr) IV Continuous <Continuous>  dextrose 50% Injectable 12.5 Gram(s) IV Push once  dextrose 50% Injectable 25 Gram(s) IV Push once  dextrose 50% Injectable 25 Gram(s) IV Push once  docusate sodium 100 milliGRAM(s) Oral two times a day  dorzolamide 2%/timolol 0.5% Ophthalmic Solution 1 Drop(s) Both EYES two times a day  ferrous    sulfate 325 milliGRAM(s) Oral daily  furosemide    Tablet 40 milliGRAM(s) Oral daily  heparin  Injectable 5000 Unit(s) SubCutaneous every 12 hours  insulin lispro (HumaLOG) corrective regimen sliding scale   SubCutaneous at bedtime  insulin lispro (HumaLOG) corrective regimen sliding scale   SubCutaneous three times a day before meals  lactulose Syrup 10 Gram(s) Oral daily  loteprednol 0.5% Ophthalmic Suspension 1 Drop(s) Both EYES two times a day  pantoprazole    Tablet 40 milliGRAM(s) Oral before breakfast  piperacillin/tazobactam IVPB. 3.375 Gram(s) IV Intermittent every 8 hours  polyethylene glycol 3350 17 Gram(s) Oral daily  senna 2 Tablet(s) Oral at bedtime  sodium chloride 0.9%. 1000 milliLiter(s) (50 mL/Hr) IV Continuous <Continuous>  tamsulosin 0.4 milliGRAM(s) Oral daily  vancomycin  IVPB 750 milliGRAM(s) IV Intermittent every 12 hours    MEDICATIONS  (PRN):  acetaminophen   Tablet. 650 milliGRAM(s) Oral every 6 hours PRN Mild and/or moderate and/or severe pain  ALBUTerol/ipratropium for Nebulization 3 milliLiter(s) Nebulizer every 6 hours PRN Shortness of Breath and/or Wheezing  dextrose Gel 1 Dose(s) Oral once PRN Blood Glucose LESS THAN 70 milliGRAM(s)/deciliter  glucagon  Injectable 1 milliGRAM(s) IntraMuscular once PRN Glucose LESS THAN 70 milligrams/deciliter      Vital Signs Last 24 Hrs  T(C): 36.9 (20 Mar 2018 04:12), Max: 36.9 (20 Mar 2018 04:12)  T(F): 98.5 (20 Mar 2018 04:12), Max: 98.5 (20 Mar 2018 04:12)  HR: 60 (20 Mar 2018 04:12) (59 - 69)  BP: 131/71 (20 Mar 2018 04:12) (121/62 - 161/73)  BP(mean): --  RR: 18 (20 Mar 2018 04:12) (18 - 18)  SpO2: 93% (20 Mar 2018 04:12) (93% - 98%)    CAPILLARY BLOOD GLUCOSE      POCT Blood Glucose.: 271 mg/dL (19 Mar 2018 21:50)  POCT Blood Glucose.: 178 mg/dL (19 Mar 2018 16:33)  POCT Blood Glucose.: 330 mg/dL (19 Mar 2018 11:24)  POCT Blood Glucose.: 167 mg/dL (19 Mar 2018 07:25)      I&O's Summary    19 Mar 2018 07:01  -  20 Mar 2018 07:00  --------------------------------------------------------  IN: 1495 mL / OUT: 2000 mL / NET: -505 mL     NET: -880 mL      PHYSICAL EXAM:  GENERAL: NAD, well-developed  HEAD:  Atraumatic  EYES: EOMI, PERRL, decreased vision b/l at baseline. Conjunctivae clear  NECK: Supple, No JVD  CHEST/LUNG: faint LLL rales. No wheezes or rhonchi. Poor inspiratory effort  HEART: Regular rate and rhythm; No murmurs, rubs, or gallops  ABDOMEN: Soft, Nontender, Nondistended; Bowel sounds present  EXTREMITIES:  2+ Peripheral Pulses, No clubbing, cyanosis, or edema  NEUROLOGY: A&O x3  SKIN: No rashes or lesions    LABS:                        10.2   11.31 )-----------( 415      ( 21 Mar 2018 07:31 )             34.7     CBC Full  -  ( 21 Mar 2018 07:31 )  WBC Count : 11.31 K/uL  Hemoglobin : 10.2 g/dL  Hematocrit : 34.7 %  Platelet Count - Automated : 415 K/uL  Mean Cell Volume : 84.6 fl  Mean Cell Hemoglobin : 24.9 pg  Mean Cell Hemoglobin Concentration : 29.4 gm/dL  Auto Neutrophil # : x  Auto Lymphocyte # : x  Auto Monocyte # : x  Auto Eosinophil # : x  Auto Basophil # : x  Auto Neutrophil % : x  Auto Lymphocyte % : x  Auto Monocyte % : x  Auto Eosinophil % : x  Auto Basophil % : x    03-21    143  |  107  |  36<H>  ----------------------------<  156<H>  4.4   |  26  |  1.44<H>    Ca    9.4      21 Mar 2018 06:11  Phos  3.2     03-20  Mg     2.2     03-21    TPro  6.9  /  Alb  3.1<L>  /  TBili  0.4  /  DBili  0.1  /  AST  53<H>  /  ALT  51<H>  /  AlkPhos  138<H>  03-20    Creatinine Trend: 1.44<--, 1.52<--, 1.38<--, 1.41<--, 1.60<--, 1.41<--  LIVER FUNCTIONS - ( 20 Mar 2018 06:29 )  Alb: 3.1 g/dL / Pro: 6.9 g/dL / ALK PHOS: 138 U/L / ALT: 51 U/L RC / AST: 53 U/L / GGT: x           PT/INR - ( 21 Mar 2018 07:31 )   PT: 12.6 sec;   INR: 1.11 ratio    PTT - ( 21 Mar 2018 07:31 )  PTT:30.1 sec          MICROBIOLOGY:    Culture - Fungal, Body Fluid (collected 20 Mar 2018 00:47)  Source: .Body Fluid Pleural Fluid  Preliminary Report (21 Mar 2018 07:11):    Testing in progress    Culture - Body Fluid with Gram Stain (collected 20 Mar 2018 00:47)  Source: .Body Fluid Pleural Fluid  Preliminary Report (20 Mar 2018 18:49):    No growth to date.    Culture - Acid Fast - Body Fluid w/Smear (collected 20 Mar 2018 00:47)  Source: .Body Fluid Pleural Fluid          RADIOLOGY & ADDITIONAL TESTS:      Imaging Personally Reviewed:     Consultant(s) Notes Reviewed:  EP    Care Discussed with Consultants/Other Providers: EP

## 2018-03-21 NOTE — PROGRESS NOTE ADULT - ASSESSMENT
81 yo [place]ican speaking M PMHx DMT2, HTN, R endarterectomy (7/2017) c/b bleed, partial SBO, admission 10/2017 with septic shock and respiratory failure c/ chest tube and pleurodesis, admitted Jan 2018 for anemia s/p emergent EGD (hg of 5.4) with evidence duodenal ulcers/duodenitis/sessile duodenal polyp w/ elevated troponins, renal failure, CXR concerning for persistent RLL process and effusion, paroxysmal Afib/flutter presenting from OSH as CCU transfer for ADHF in setting of ?hypertensive emergency c/b aflutter with variable block/junctional escape and polymorphic VT now s/p dual chamber AICD without further episodes of VT. s/p LHC with 85% stenosis of proximal cx and  of pRCA without intervention. 83 yo [place]ian speaking M PMHx DMT2, HTN, R endarterectomy (7/2017) c/b bleed, partial SBO, admission 10/2017 with septic shock and respiratory failure c/ chest tube and pleurodesis, admitted Jan 2018 for anemia s/p emergent EGD (hg of 5.4) with evidence duodenal ulcers/duodenitis/sessile duodenal polyp w/ elevated troponins, renal failure, CXR concerning for persistent RLL process and effusion, paroxysmal Afib/flutter presenting from OSH as CCU transfer for ADHF in setting of ?hypertensive emergency c/b aflutter with variable block/junctional escape and polymorphic VT now s/p dual chamber AICD without further episodes of VT. s/p LHC with 85% stenosis of proximal cx and  of pRCA without intervention. s/p PCI FARRAH (3/21) x 1 to pLCx 80% via RFA access

## 2018-03-21 NOTE — PROGRESS NOTE ADULT - SUBJECTIVE AND OBJECTIVE BOX
INTERVAL HPI/OVERNIGHT EVENTS: pt was seen this afternoon post PCI to P.Circ. resting in bed, denies chest pain, sob, dizziness or palpitation.     MEDICATIONS  (STANDING):  artificial  tears Solution 1 Drop(s) Both EYES three times a day  ascorbic acid 500 milliGRAM(s) Oral daily  aspirin  chewable 81 milliGRAM(s) Oral daily  atorvastatin 40 milliGRAM(s) Oral at bedtime  BACItracin   Ophthalmic Ointment 1 Application(s) Right EYE daily  brimonidine 0.2% Ophthalmic Solution 1 Drop(s) Right EYE two times a day  carvedilol 6.25 milliGRAM(s) Oral every 12 hours  clopidogrel Tablet 75 milliGRAM(s) Oral daily  dextrose 5%. 1000 milliLiter(s) (50 mL/Hr) IV Continuous <Continuous>  dextrose 50% Injectable 12.5 Gram(s) IV Push once  dextrose 50% Injectable 25 Gram(s) IV Push once  dextrose 50% Injectable 25 Gram(s) IV Push once  docusate sodium 100 milliGRAM(s) Oral two times a day  dorzolamide 2%/timolol 0.5% Ophthalmic Solution 1 Drop(s) Both EYES two times a day  ferrous    sulfate 325 milliGRAM(s) Oral daily  heparin  Injectable 5000 Unit(s) SubCutaneous every 12 hours  insulin glargine Injectable (LANTUS) 3 Unit(s) SubCutaneous at bedtime  insulin lispro (HumaLOG) corrective regimen sliding scale   SubCutaneous at bedtime  insulin lispro (HumaLOG) corrective regimen sliding scale   SubCutaneous three times a day before meals  loteprednol 0.5% Ophthalmic Suspension 1 Drop(s) Both EYES two times a day  pantoprazole    Tablet 40 milliGRAM(s) Oral two times a day  piperacillin/tazobactam IVPB. 3.375 Gram(s) IV Intermittent every 8 hours  polyethylene glycol 3350 17 Gram(s) Oral daily  senna 2 Tablet(s) Oral at bedtime  sodium chloride 0.9%. 1000 milliLiter(s) (50 mL/Hr) IV Continuous <Continuous>  tamsulosin 0.4 milliGRAM(s) Oral daily  vancomycin  IVPB 750 milliGRAM(s) IV Intermittent every 12 hours    MEDICATIONS  (PRN):  acetaminophen   Tablet. 650 milliGRAM(s) Oral every 6 hours PRN Mild and/or moderate and/or severe pain  ALBUTerol/ipratropium for Nebulization 3 milliLiter(s) Nebulizer every 6 hours PRN Shortness of Breath and/or Wheezing  dextrose Gel 1 Dose(s) Oral once PRN Blood Glucose LESS THAN 70 milliGRAM(s)/deciliter  glucagon  Injectable 1 milliGRAM(s) IntraMuscular once PRN Glucose LESS THAN 70 milligrams/deciliter  zolpidem 5 milliGRAM(s) Oral at bedtime PRN Insomnia      Allergies  No Known Allergies    ROS:  General: Pt denies fever/chills    Cardiovascular: denies chest pain/palpitations/leg edema    Respiratory and Thorax: denies SOB/cough/wheezing    Gastrointestinal: denies abdominal pain/diarrhea/constipation/bloody stool    Musculoskeletal: denies joint pain or swelling, denies restricted motion    Skin: denies rashes/sores    Hematologic: denies abnormal bleeding    Vital Signs Last 24 Hrs  T(C): 36.4 (21 Mar 2018 15:43), Max: 36.9 (20 Mar 2018 20:08)  T(F): 97.5 (21 Mar 2018 15:43), Max: 98.5 (20 Mar 2018 20:08)  HR: 74 (21 Mar 2018 15:43) (60 - 80)  BP: 138/68 (21 Mar 2018 15:43) (117/63 - 165/70)  RR: 18 (21 Mar 2018 15:43) (18 - 18)  SpO2: 93% (21 Mar 2018 15:43) (93% - 100%)    Physical Exam:  Constitutional: Canadian speaking, well developed, well nourished,  and no acute distress    Neurological: Alert & Oriented x 3,  no focal deficits    HEENT:   Neck supple.    Respiratory: diminished at base( Rt > Lt), No wheezing/crackles/rhonchi    Cardiovascular: (+) S1 & S2, RRR    Gastrointestinal: soft, NT, nondistended, (+) BS    Genitourinary: non distended bladder, voiding freely    Extremities: No pedal edema, No clubbing, No cyanosis    Skin:  normal skin color and pigmentation, no skin lesions    LABS:                        10.2   11.31 )-----------( 415      ( 21 Mar 2018 07:31 )             34.7     03-21    143  |  107  |  36<H>  ----------------------------<  156<H>  4.4   |  26  |  1.44<H>    Ca    9.4      21 Mar 2018 06:11  Phos  3.2     03-20  Mg     2.2     03-21    TPro  6.9  /  Alb  3.1<L>  /  TBili  0.4  /  DBili  0.1  /  AST  53<H>  /  ALT  51<H>  /  AlkPhos  138<H>  03-20    PT/INR - ( 21 Mar 2018 07:31 )   PT: 12.6 sec;   INR: 1.11 ratio         PTT - ( 21 Mar 2018 07:31 )  PTT:30.1 sec      RADIOLOGY & ADDITIONAL TESTS:    TELE: Aflutter with rate at 60s-80s overnight, SR with V-paced at rate 80s since 8: 30 am     EKG: Sinus with V-paced at rate 70 bpm

## 2018-03-22 LAB
ANION GAP SERPL CALC-SCNC: 10 MMOL/L — SIGNIFICANT CHANGE UP (ref 5–17)
BUN SERPL-MCNC: 36 MG/DL — HIGH (ref 7–23)
CALCIUM SERPL-MCNC: 8.9 MG/DL — SIGNIFICANT CHANGE UP (ref 8.4–10.5)
CHLORIDE SERPL-SCNC: 104 MMOL/L — SIGNIFICANT CHANGE UP (ref 96–108)
CO2 SERPL-SCNC: 23 MMOL/L — SIGNIFICANT CHANGE UP (ref 22–31)
CREAT SERPL-MCNC: 1.34 MG/DL — HIGH (ref 0.5–1.3)
GLUCOSE BLDC GLUCOMTR-MCNC: 109 MG/DL — HIGH (ref 70–99)
GLUCOSE BLDC GLUCOMTR-MCNC: 127 MG/DL — HIGH (ref 70–99)
GLUCOSE BLDC GLUCOMTR-MCNC: 185 MG/DL — HIGH (ref 70–99)
GLUCOSE SERPL-MCNC: 126 MG/DL — HIGH (ref 70–99)
HCT VFR BLD CALC: 30.8 % — LOW (ref 39–50)
HGB BLD-MCNC: 9.2 G/DL — LOW (ref 13–17)
MAGNESIUM SERPL-MCNC: 2.2 MG/DL — SIGNIFICANT CHANGE UP (ref 1.6–2.6)
MCHC RBC-ENTMCNC: 25.4 PG — LOW (ref 27–34)
MCHC RBC-ENTMCNC: 29.9 GM/DL — LOW (ref 32–36)
MCV RBC AUTO: 85.1 FL — SIGNIFICANT CHANGE UP (ref 80–100)
NON-GYNECOLOGICAL CYTOLOGY STUDY: SIGNIFICANT CHANGE UP
NRBC # BLD: 0 /100 WBCS — SIGNIFICANT CHANGE UP (ref 0–0)
PHOSPHATE SERPL-MCNC: 3.6 MG/DL — SIGNIFICANT CHANGE UP (ref 2.5–4.5)
PLATELET # BLD AUTO: 372 K/UL — SIGNIFICANT CHANGE UP (ref 150–400)
POTASSIUM SERPL-MCNC: 3.8 MMOL/L — SIGNIFICANT CHANGE UP (ref 3.5–5.3)
POTASSIUM SERPL-SCNC: 3.8 MMOL/L — SIGNIFICANT CHANGE UP (ref 3.5–5.3)
RBC # BLD: 3.62 M/UL — LOW (ref 4.2–5.8)
RBC # FLD: 17.5 % — HIGH (ref 10.3–14.5)
SODIUM SERPL-SCNC: 137 MMOL/L — SIGNIFICANT CHANGE UP (ref 135–145)
WBC # BLD: 9.9 K/UL — SIGNIFICANT CHANGE UP (ref 3.8–10.5)
WBC # FLD AUTO: 9.9 K/UL — SIGNIFICANT CHANGE UP (ref 3.8–10.5)

## 2018-03-22 PROCEDURE — 99233 SBSQ HOSP IP/OBS HIGH 50: CPT | Mod: GC

## 2018-03-22 PROCEDURE — 93010 ELECTROCARDIOGRAM REPORT: CPT

## 2018-03-22 PROCEDURE — 93325 DOPPLER ECHO COLOR FLOW MAPG: CPT | Mod: 26

## 2018-03-22 PROCEDURE — 76376 3D RENDER W/INTRP POSTPROCES: CPT | Mod: 26

## 2018-03-22 PROCEDURE — 93312 ECHO TRANSESOPHAGEAL: CPT | Mod: 26

## 2018-03-22 PROCEDURE — 93320 DOPPLER ECHO COMPLETE: CPT | Mod: 26

## 2018-03-22 RX ORDER — ROSUVASTATIN CALCIUM 5 MG/1
1 TABLET ORAL
Qty: 0 | Refills: 0 | COMMUNITY

## 2018-03-22 RX ORDER — ATORVASTATIN CALCIUM 80 MG/1
1 TABLET, FILM COATED ORAL
Qty: 0 | Refills: 0 | COMMUNITY
Start: 2018-03-22

## 2018-03-22 RX ORDER — PANTOPRAZOLE SODIUM 20 MG/1
1 TABLET, DELAYED RELEASE ORAL
Qty: 0 | Refills: 0 | COMMUNITY
Start: 2018-03-22

## 2018-03-22 RX ORDER — CARVEDILOL PHOSPHATE 80 MG/1
1 CAPSULE, EXTENDED RELEASE ORAL
Qty: 0 | Refills: 0 | COMMUNITY
Start: 2018-03-22

## 2018-03-22 RX ORDER — CARVEDILOL PHOSPHATE 80 MG/1
1 CAPSULE, EXTENDED RELEASE ORAL
Qty: 0 | Refills: 0 | COMMUNITY

## 2018-03-22 RX ORDER — ASPIRIN/CALCIUM CARB/MAGNESIUM 324 MG
1 TABLET ORAL
Qty: 0 | Refills: 0 | COMMUNITY
Start: 2018-03-22

## 2018-03-22 RX ADMIN — BACITRACIN 1 APPLICATION(S): 500 OINTMENT OPHTHALMIC at 17:51

## 2018-03-22 RX ADMIN — Medication 100 MILLIGRAM(S): at 05:36

## 2018-03-22 RX ADMIN — Medication 1 TABLET(S): at 05:35

## 2018-03-22 RX ADMIN — Medication 325 MILLIGRAM(S): at 17:50

## 2018-03-22 RX ADMIN — Medication 1 DROP(S): at 05:40

## 2018-03-22 RX ADMIN — PANTOPRAZOLE SODIUM 40 MILLIGRAM(S): 20 TABLET, DELAYED RELEASE ORAL at 05:36

## 2018-03-22 RX ADMIN — ATORVASTATIN CALCIUM 40 MILLIGRAM(S): 80 TABLET, FILM COATED ORAL at 21:40

## 2018-03-22 RX ADMIN — SENNA PLUS 2 TABLET(S): 8.6 TABLET ORAL at 21:40

## 2018-03-22 RX ADMIN — Medication 1 TABLET(S): at 17:49

## 2018-03-22 RX ADMIN — TAMSULOSIN HYDROCHLORIDE 0.4 MILLIGRAM(S): 0.4 CAPSULE ORAL at 21:40

## 2018-03-22 RX ADMIN — Medication 100 MILLIGRAM(S): at 17:49

## 2018-03-22 RX ADMIN — Medication 1 DROP(S): at 22:16

## 2018-03-22 RX ADMIN — BRIMONIDINE TARTRATE 1 DROP(S): 2 SOLUTION/ DROPS OPHTHALMIC at 17:49

## 2018-03-22 RX ADMIN — DORZOLAMIDE HYDROCHLORIDE TIMOLOL MALEATE 1 DROP(S): 20; 5 SOLUTION/ DROPS OPHTHALMIC at 05:40

## 2018-03-22 RX ADMIN — INSULIN GLARGINE 3 UNIT(S): 100 INJECTION, SOLUTION SUBCUTANEOUS at 21:40

## 2018-03-22 RX ADMIN — CARVEDILOL PHOSPHATE 6.25 MILLIGRAM(S): 80 CAPSULE, EXTENDED RELEASE ORAL at 05:35

## 2018-03-22 RX ADMIN — Medication 81 MILLIGRAM(S): at 17:50

## 2018-03-22 RX ADMIN — DORZOLAMIDE HYDROCHLORIDE TIMOLOL MALEATE 1 DROP(S): 20; 5 SOLUTION/ DROPS OPHTHALMIC at 17:49

## 2018-03-22 RX ADMIN — HEPARIN SODIUM 5000 UNIT(S): 5000 INJECTION INTRAVENOUS; SUBCUTANEOUS at 05:36

## 2018-03-22 RX ADMIN — CARVEDILOL PHOSPHATE 6.25 MILLIGRAM(S): 80 CAPSULE, EXTENDED RELEASE ORAL at 17:50

## 2018-03-22 RX ADMIN — Medication 150 MILLIGRAM(S): at 02:30

## 2018-03-22 RX ADMIN — LOTEPREDNOL ETABONATE 1 DROP(S): 2 SUSPENSION/ DROPS OPHTHALMIC at 05:40

## 2018-03-22 RX ADMIN — CLOPIDOGREL BISULFATE 75 MILLIGRAM(S): 75 TABLET, FILM COATED ORAL at 17:50

## 2018-03-22 RX ADMIN — BRIMONIDINE TARTRATE 1 DROP(S): 2 SOLUTION/ DROPS OPHTHALMIC at 05:40

## 2018-03-22 RX ADMIN — LOTEPREDNOL ETABONATE 1 DROP(S): 2 SUSPENSION/ DROPS OPHTHALMIC at 17:51

## 2018-03-22 RX ADMIN — Medication 1 DROP(S): at 17:49

## 2018-03-22 RX ADMIN — PANTOPRAZOLE SODIUM 40 MILLIGRAM(S): 20 TABLET, DELAYED RELEASE ORAL at 17:50

## 2018-03-22 RX ADMIN — Medication 500 MILLIGRAM(S): at 17:50

## 2018-03-22 RX ADMIN — HEPARIN SODIUM 5000 UNIT(S): 5000 INJECTION INTRAVENOUS; SUBCUTANEOUS at 17:49

## 2018-03-22 NOTE — PROGRESS NOTE ADULT - ASSESSMENT
Mr. Villalpando is a 82 year old man with R sided endarterectomy [7/2017], GI bleed s/p colonic tumor removal in 12/2017, gordon.fib / flutter, presented to OSH with dyspnea -- found to be bradycardiac with a. flutter - variable block. s/p PPM. His respiratory status as significantly improved with diuresis. Now s/p cath and PCI to the circ.     Previously consulted for R pleural effusion. s/p thoracentesis -- exudative criteria, but with elevated SAAG -- suggests HF etiology in the setting of diuresis. From the CT, it looks like chronic rounded atelectasis. The f/u CXR shows improved R pleural effusion -- but still with the atelectasis -- likely a chronic process.     Patient getting a RADHA today. Ask for pulmonary pre-op assessment.      - f/u the cytology results  - finish off the 5 d course of empiric abx.   - will not bronch as this is chronic and given his risk factors. will follow up out patient.   - he needs a rpt CT scan in 2-3 months.   - he should follow up at pulmonary clinic   [address] [place] [address] [address]   [place]   [phone]    please f/u attg note.     Taj Cooper MD   Pulmonary Fellow Mr. Villalpando is a 82 year old man with R sided endarterectomy [7/2017], GI bleed s/p colonic tumor removal in 12/2017, a.fib / flutter, presented to OSH with dyspnea -- found to be bradycardiac with a. flutter - variable block. s/p PPM. His respiratory status as significantly improved with diuresis. Now s/p cath and PCI to the circ.     Previously consulted for R pleural effusion. s/p thoracentesis -- exudative criteria, but with elevated SAAG -- suggests HF etiology in the setting of diuresis. From the CT, it looks like chronic rounded atelectasis. The f/u CXR shows improved R pleural effusion -- but still with the atelectasis -- likely a chronic process.     Patient getting a RADHA today. Ask for pulmonary pre-op assessment. Patient is asymptomatic and does not have any breathing difficulties. He is not wheezing. He still has decreased BS on the R base, but I do not expect that to improve as he likely has chronic atelectasis. He is optimized from the pulmonary perspective for this low risk procedure.   - aspiration precautions with the procedure.    - f/u the cytology results  - finish off the 5 d course of empiric abx.   - will not bronch as this is chronic and given his risk factors. will follow up outpatient.   - he needs a rpt CT scan in 2-3 months.   - he should follow up at pulmonary clinic   [address] [place] [address] [address]   [place]   [phone]    Taj Cooper MD   Pulmonary Fellow

## 2018-03-22 NOTE — PROGRESS NOTE ADULT - PROBLEM SELECTOR PLAN 3
CHADSVasc Score 7. Rate well controlled AV paced 60s-90s. On ASA.  -Per GI, no scope this admission, no contraindication to DAPT or coumadin. Will likely start Coumadin outpatient.   Pending RADHA for evaluation of Watchman.

## 2018-03-22 NOTE — PROGRESS NOTE ADULT - ASSESSMENT
82 year old man  [place]orese speaking history of  DMT2, HTN, R endarterectomy (7/2017) c/b bleed, partial SBO, admission 10/2017 with septic shock and respiratory failure c/ chest tube and pleurodesis, admitted Jan 2018 for anemia s/p emergent EGD (hg of 5.4) with evidence duodenal ulcers/duodenitis/sessile duodenal polyp w/ elevated troponins, renal failure, CXR concerning for persistent RLL process and effusion, paroxysmal Afib/flutter presenting from OSH as CCU transfer for ADHF in setting of ?hypertensive emergency c/b aflutter with variable block/junctional escape and polymorphic VT now s/p dual chamber AICD without further episodes of VT. s/p LHC with 85% stenosis of proximal cx and  of pRCA without intervention. s/p PCI FARRAH (3/21) x 1 to pLCx 80% via RFA access.   Pending RADHA for Watchman evaluation.

## 2018-03-22 NOTE — PHYSICAL THERAPY INITIAL EVALUATION ADULT - PERTINENT HX OF CURRENT PROBLEM, REHAB EVAL
83 y/o M w/ PMHx of DM2, HTN, R sided endarterectomy (7/2017) c/p bleed, GI bleed s/p colonic tumor removal 12/2017, paroxysmal Afib/flutter presenting to OSH with worsening SOB. 83 y/o M w/ PMHx of DM2, HTN, R sided endarterectomy (7/2017) c/p bleed, GI bleed s/p colonic tumor removal 12/2017, paroxysmal Afib/flutter presenting to OSH with worsening SOB.CT chest: small-mod R pleural effusion, CXR:(-)pneumothorax

## 2018-03-22 NOTE — PROGRESS NOTE ADULT - PROBLEM SELECTOR PLAN 2
Patient Moderate loculated pleural effusion s/p thoracentesis 3/19. 600cc serous fluid removed. Lights Criteria + exudative effusion. Follow up outpatient with pulm   Continue Augmentin BID for presumed PNA.

## 2018-03-22 NOTE — PROGRESS NOTE ADULT - PROBLEM SELECTOR PLAN 1
- tele: SR with V-paced at rate 60s-80s, 8 beats of NSVT  - would recommend AC (CHADSVasc Score 7), as cleared by GI  - remain NPO post MN for RADHA today (Pulmonary cleared for the procedure as per Team 3) to assess for candidacy of Watchman  - continue BB      # [id number]

## 2018-03-22 NOTE — PROVIDER CONTACT NOTE (OTHER) - ACTION/TREATMENT ORDERED:
Stat BMP, mag and phos to be drawn.
MD aware of Tele episode. Will insure electrolytes are checked in morning labs. Will continue to monitor on tele.

## 2018-03-22 NOTE — PROGRESS NOTE ADULT - SUBJECTIVE AND OBJECTIVE BOX
INTERVAL HPI/OVERNIGHT EVENTS: NPO for RADHA today, resting comfortably in bed, denies chest pain, sob or palpitation.      MEDICATIONS  (STANDING):  amoxicillin  875 milliGRAM(s)/clavulanate 1 Tablet(s) Oral two times a day  artificial  tears Solution 1 Drop(s) Both EYES three times a day  ascorbic acid 500 milliGRAM(s) Oral daily  aspirin  chewable 81 milliGRAM(s) Oral daily  atorvastatin 40 milliGRAM(s) Oral at bedtime  BACItracin   Ophthalmic Ointment 1 Application(s) Right EYE daily  brimonidine 0.2% Ophthalmic Solution 1 Drop(s) Right EYE two times a day  carvedilol 6.25 milliGRAM(s) Oral every 12 hours  clopidogrel Tablet 75 milliGRAM(s) Oral daily  dextrose 5%. 1000 milliLiter(s) (50 mL/Hr) IV Continuous <Continuous>  dextrose 50% Injectable 12.5 Gram(s) IV Push once  dextrose 50% Injectable 25 Gram(s) IV Push once  dextrose 50% Injectable 25 Gram(s) IV Push once  docusate sodium 100 milliGRAM(s) Oral two times a day  dorzolamide 2%/timolol 0.5% Ophthalmic Solution 1 Drop(s) Both EYES two times a day  ferrous    sulfate 325 milliGRAM(s) Oral daily  heparin  Injectable 5000 Unit(s) SubCutaneous every 12 hours  insulin glargine Injectable (LANTUS) 3 Unit(s) SubCutaneous at bedtime  insulin lispro (HumaLOG) corrective regimen sliding scale   SubCutaneous at bedtime  insulin lispro (HumaLOG) corrective regimen sliding scale   SubCutaneous three times a day before meals  loteprednol 0.5% Ophthalmic Suspension 1 Drop(s) Both EYES two times a day  pantoprazole    Tablet 40 milliGRAM(s) Oral two times a day  polyethylene glycol 3350 17 Gram(s) Oral daily  senna 2 Tablet(s) Oral at bedtime  sodium chloride 0.9%. 1000 milliLiter(s) (50 mL/Hr) IV Continuous <Continuous>  tamsulosin 0.4 milliGRAM(s) Oral daily    MEDICATIONS  (PRN):  acetaminophen   Tablet. 650 milliGRAM(s) Oral every 6 hours PRN Mild and/or moderate and/or severe pain  ALBUTerol/ipratropium for Nebulization 3 milliLiter(s) Nebulizer every 6 hours PRN Shortness of Breath and/or Wheezing  dextrose Gel 1 Dose(s) Oral once PRN Blood Glucose LESS THAN 70 milliGRAM(s)/deciliter  glucagon  Injectable 1 milliGRAM(s) IntraMuscular once PRN Glucose LESS THAN 70 milligrams/deciliter  zolpidem 5 milliGRAM(s) Oral at bedtime PRN Insomnia      Allergies  No Known Allergies    ROS:  General: Pt denies fever/chills    Cardiovascular: denies chest pain/palpitations/leg edema    Respiratory and Thorax: denies SOB/cough/wheezing    Gastrointestinal: denies abdominal pain/diarrhea/constipation/bloody stool    Musculoskeletal: denies joint pain or swelling, denies restricted motion    Skin: denies rashes/sores    Hematologic: denies abnormal bleeding    Vital Signs Last 24 Hrs  T(C): 36.4 (22 Mar 2018 04:36), Max: 36.7 (21 Mar 2018 14:23)  T(F): 97.5 (22 Mar 2018 04:36), Max: 98.1 (21 Mar 2018 17:00)  HR: 73 (22 Mar 2018 04:36) (69 - 80)  BP: 118/66 (22 Mar 2018 04:36) (118/66 - 150/79)  RR: 18 (22 Mar 2018 04:36) (18 - 18)  SpO2: 93% (22 Mar 2018 04:36) (93% - 100%)    Physical Exam:  Constitutional: [place]ese speaking, well developed, well nourished,  and no acute distress    Neurological: Alert & Oriented x 3,  no focal deficits    HEENT:   Neck supple.    Respiratory: CTA B/L, No wheezing/crackles/rhonchi    Cardiovascular: (+) S1 & S2, RRR    Gastrointestinal: soft, NT, nondistended, (+) BS    Genitourinary: non distended bladder, voiding freely    Extremities: No pedal edema, No clubbing, No cyanosis    Skin:  normal skin color and pigmentation, no skin lesions    LABS:                        9.2    9.90  )-----------( 372      ( 22 Mar 2018 07:36 )             30.8     03-22    137  |  104  |  36<H>  ----------------------------<  126<H>  3.8   |  23  |  1.34<H>    Ca    8.9      22 Mar 2018 06:12  Phos  3.6     03-22  Mg     2.2     03-22      PT/INR - ( 21 Mar 2018 07:31 )   PT: 12.6 sec;   INR: 1.11 ratio         PTT - ( 21 Mar 2018 07:31 )  PTT:30.1 sec      RADIOLOGY & ADDITIONAL TESTS:    TELE: Sinus with V-pacing at rate 60s-80s , episode of NSVT (8 beats) on tele.

## 2018-03-22 NOTE — PROGRESS NOTE ADULT - SUBJECTIVE AND OBJECTIVE BOX
Shayla Jim MD PGY2  Pager [phone]    Patient is a 82y old  Male who presents with a chief complaint of Shortness of breath (19 Mar 2018 18:10)    SUBJECTIVE/OVERNIGHT EVENTS: Wife at bedside. Does not want to wake . States that he slept well. Wondering if he can leave today.     MEDICATIONS  (STANDING):  amoxicillin  875 milliGRAM(s)/clavulanate 1 Tablet(s) Oral two times a day  artificial  tears Solution 1 Drop(s) Both EYES three times a day  ascorbic acid 500 milliGRAM(s) Oral daily  aspirin  chewable 81 milliGRAM(s) Oral daily  atorvastatin 40 milliGRAM(s) Oral at bedtime  BACItracin   Ophthalmic Ointment 1 Application(s) Right EYE daily  brimonidine 0.2% Ophthalmic Solution 1 Drop(s) Right EYE two times a day  carvedilol 6.25 milliGRAM(s) Oral every 12 hours  clopidogrel Tablet 75 milliGRAM(s) Oral daily  dextrose 5%. 1000 milliLiter(s) (50 mL/Hr) IV Continuous <Continuous>  dextrose 50% Injectable 12.5 Gram(s) IV Push once  dextrose 50% Injectable 25 Gram(s) IV Push once  dextrose 50% Injectable 25 Gram(s) IV Push once  docusate sodium 100 milliGRAM(s) Oral two times a day  dorzolamide 2%/timolol 0.5% Ophthalmic Solution 1 Drop(s) Both EYES two times a day  ferrous    sulfate 325 milliGRAM(s) Oral daily  heparin  Injectable 5000 Unit(s) SubCutaneous every 12 hours  insulin glargine Injectable (LANTUS) 3 Unit(s) SubCutaneous at bedtime  insulin lispro (HumaLOG) corrective regimen sliding scale   SubCutaneous at bedtime  insulin lispro (HumaLOG) corrective regimen sliding scale   SubCutaneous three times a day before meals  loteprednol 0.5% Ophthalmic Suspension 1 Drop(s) Both EYES two times a day  pantoprazole    Tablet 40 milliGRAM(s) Oral two times a day  polyethylene glycol 3350 17 Gram(s) Oral daily  senna 2 Tablet(s) Oral at bedtime  sodium chloride 0.9%. 1000 milliLiter(s) (50 mL/Hr) IV Continuous <Continuous>  tamsulosin 0.4 milliGRAM(s) Oral daily    MEDICATIONS  (PRN):  acetaminophen   Tablet. 650 milliGRAM(s) Oral every 6 hours PRN Mild and/or moderate and/or severe pain  ALBUTerol/ipratropium for Nebulization 3 milliLiter(s) Nebulizer every 6 hours PRN Shortness of Breath and/or Wheezing  dextrose Gel 1 Dose(s) Oral once PRN Blood Glucose LESS THAN 70 milliGRAM(s)/deciliter  glucagon  Injectable 1 milliGRAM(s) IntraMuscular once PRN Glucose LESS THAN 70 milligrams/deciliter  zolpidem 5 milliGRAM(s) Oral at bedtime PRN Insomnia    CAPILLARY BLOOD GLUCOSE    POCT Blood Glucose.: 127 mg/dL (22 Mar 2018 07:32)  POCT Blood Glucose.: 264 mg/dL (21 Mar 2018 22:37)  POCT Blood Glucose.: 214 mg/dL (21 Mar 2018 21:27)  POCT Blood Glucose.: 191 mg/dL (21 Mar 2018 16:36)  POCT Blood Glucose.: 123 mg/dL (21 Mar 2018 14:18)  POCT Blood Glucose.: 137 mg/dL (21 Mar 2018 09:45)    I&O's Summary    21 Mar 2018 07:01  -  22 Mar 2018 07:00  --------------------------------------------------------  IN: 835 mL / OUT: 725 mL / NET: 110 mL    Vital Signs Last 24 Hrs  T(C): 36.4 (22 Mar 2018 04:36), Max: 36.7 (21 Mar 2018 14:23)  T(F): 97.5 (22 Mar 2018 04:36), Max: 98.1 (21 Mar 2018 17:00)  HR: 73 (22 Mar 2018 04:36) (69 - 80)  BP: 118/66 (22 Mar 2018 04:36) (118/66 - 150/79)  BP(mean): --  RR: 18 (22 Mar 2018 04:36) (18 - 18)  SpO2: 93% (22 Mar 2018 04:36) (93% - 100%)    PHYSICAL EXAM:  Wife did not want to  wake patient up.    LABS                        9.2    9.90  )-----------( 372      ( 22 Mar 2018 07:36 )             30.8                         10.2   11.31 )-----------( 415      ( 21 Mar 2018 07:31 )             34.7     03-22    137  |  104  |  36<H>  ----------------------------<  126<H>  3.8   |  23  |  1.34<H>  03-21    143  |  107  |  36<H>  ----------------------------<  156<H>  4.4   |  26  |  1.44<H>    Ca    8.9      22 Mar 2018 06:12  Ca    9.4      21 Mar 2018 06:11  Phos  3.6     03-22  Mg     2.2     03-22      PT/INR - ( 21 Mar 2018 07:31 )   PT: 12.6 sec;   INR: 1.11 ratio    PTT - ( 21 Mar 2018 07:31 )  PTT:30.1 sec   16 Mar 2018 04:51 Troponin 0.08 ng/mL / CK 63 U/L / CKMB x     / CKMB Units 2.6 ng/mL   15 Mar 2018 22:14 Troponin 0.09 ng/mL / CK 44 U/L / CKMB x     / CKMB Units 2.5 ng/mL   13 Mar 2018 23:50 Troponin 0.10 ng/mL / CK 38 U/L / CKMB x     / CKMB Units 3.1 ng/mL    ( 16 Mar 2018 04:40 ) pH: 7.47  /  pCO2: 35    /  pO2: 112   / HCO3: 25    / Base Excess: 1.8   /  SaO2: 98      ( 15 Mar 2018 22:11 ) pH: 7.38  /  pCO2: 41    /  pO2: 142   / HCO3: 23    / Base Excess: -1.1  /  SaO2: 98        RADIOLOGY & ADDITIONAL TESTS:    Imaging Personally Reviewed: No new imaging     Consultant(s) Notes Reviewed:  EP, Pulm, Cards     Care Discussed with Consultants/Other Providers:

## 2018-03-22 NOTE — PROGRESS NOTE ADULT - ASSESSMENT
82 year old O[place]i speaking Male with PMHx significant for CAD, TIA, DM Type 2, HTN, AFib (Not on A/C),COPD,  R endarterectomy (7/2017) c/b bleed, partial SBO, admission 10/2017 with septic shock and respiratory failure c/ chest tube and pleurodesis, admitted Jan 2018 for GIB/ anemia s/p emergent EGD (hg of 5.4) with evidence duodenal ulcers/duodenitis/sessile duodenal polyp w/ elevated troponins, renal failure, CXR concerning for persistent RLL process and effusion, paroxysmal Afib/flutter presenting from OSH as CCU transfer for ADHF in setting of ?hypertensive emergency c/b aflutter with Complete Heart Block and polymorphic VT now s/p ICD on 3/16, s/p thoracentesis on 3/20 and on IV Abx for possible Aspiration pneumonia. S/p LHC on 3/15 with 85% stenosis of proximal cx and 100% chronic lesion of pRCA, s/p PCI to pCirc on 3/21, cleared for anticoagulation by GI.

## 2018-03-22 NOTE — PROGRESS NOTE ADULT - PROBLEM SELECTOR PLAN 4
CT chest found 1cm ZORAIDA nodule, significant smoking hx  -f/u outpatient repeat CT chest in 2-3 months

## 2018-03-22 NOTE — PROVIDER CONTACT NOTE (MEDICATION) - ACTION/TREATMENT ORDERED:
Pt back in SR 70's. Will continue to monitor on tele. Will draw AM labs to check electrolytes. MD aware.

## 2018-03-23 VITALS
TEMPERATURE: 98 F | OXYGEN SATURATION: 98 % | DIASTOLIC BLOOD PRESSURE: 67 MMHG | SYSTOLIC BLOOD PRESSURE: 158 MMHG | RESPIRATION RATE: 18 BRPM | HEART RATE: 78 BPM

## 2018-03-23 LAB
ANION GAP SERPL CALC-SCNC: 12 MMOL/L — SIGNIFICANT CHANGE UP (ref 5–17)
BUN SERPL-MCNC: 30 MG/DL — HIGH (ref 7–23)
CALCIUM SERPL-MCNC: 8.7 MG/DL — SIGNIFICANT CHANGE UP (ref 8.4–10.5)
CHLORIDE SERPL-SCNC: 108 MMOL/L — SIGNIFICANT CHANGE UP (ref 96–108)
CO2 SERPL-SCNC: 22 MMOL/L — SIGNIFICANT CHANGE UP (ref 22–31)
CREAT SERPL-MCNC: 1.3 MG/DL — SIGNIFICANT CHANGE UP (ref 0.5–1.3)
GLUCOSE BLDC GLUCOMTR-MCNC: 121 MG/DL — HIGH (ref 70–99)
GLUCOSE BLDC GLUCOMTR-MCNC: 248 MG/DL — HIGH (ref 70–99)
GLUCOSE SERPL-MCNC: 110 MG/DL — HIGH (ref 70–99)
MAGNESIUM SERPL-MCNC: 2.2 MG/DL — SIGNIFICANT CHANGE UP (ref 1.6–2.6)
POTASSIUM SERPL-MCNC: 4.4 MMOL/L — SIGNIFICANT CHANGE UP (ref 3.5–5.3)
POTASSIUM SERPL-SCNC: 4.4 MMOL/L — SIGNIFICANT CHANGE UP (ref 3.5–5.3)
SODIUM SERPL-SCNC: 142 MMOL/L — SIGNIFICANT CHANGE UP (ref 135–145)

## 2018-03-23 PROCEDURE — C1874: CPT

## 2018-03-23 PROCEDURE — 82962 GLUCOSE BLOOD TEST: CPT

## 2018-03-23 PROCEDURE — 71250 CT THORAX DX C-: CPT

## 2018-03-23 PROCEDURE — 93308 TTE F-UP OR LMTD: CPT

## 2018-03-23 PROCEDURE — 85027 COMPLETE CBC AUTOMATED: CPT

## 2018-03-23 PROCEDURE — 86850 RBC ANTIBODY SCREEN: CPT

## 2018-03-23 PROCEDURE — 86677 HELICOBACTER PYLORI ANTIBODY: CPT

## 2018-03-23 PROCEDURE — 83036 HEMOGLOBIN GLYCOSYLATED A1C: CPT

## 2018-03-23 PROCEDURE — 86900 BLOOD TYPING SEROLOGIC ABO: CPT

## 2018-03-23 PROCEDURE — 80048 BASIC METABOLIC PNL TOTAL CA: CPT

## 2018-03-23 PROCEDURE — C1898: CPT

## 2018-03-23 PROCEDURE — 88305 TISSUE EXAM BY PATHOLOGIST: CPT

## 2018-03-23 PROCEDURE — 99152 MOD SED SAME PHYS/QHP 5/>YRS: CPT

## 2018-03-23 PROCEDURE — 94660 CPAP INITIATION&MGMT: CPT

## 2018-03-23 PROCEDURE — C1894: CPT

## 2018-03-23 PROCEDURE — 83880 ASSAY OF NATRIURETIC PEPTIDE: CPT

## 2018-03-23 PROCEDURE — 82803 BLOOD GASES ANY COMBINATION: CPT

## 2018-03-23 PROCEDURE — 80053 COMPREHEN METABOLIC PANEL: CPT

## 2018-03-23 PROCEDURE — 84484 ASSAY OF TROPONIN QUANT: CPT

## 2018-03-23 PROCEDURE — 87206 SMEAR FLUORESCENT/ACID STAI: CPT

## 2018-03-23 PROCEDURE — C1721: CPT

## 2018-03-23 PROCEDURE — C1725: CPT

## 2018-03-23 PROCEDURE — 82550 ASSAY OF CK (CPK): CPT

## 2018-03-23 PROCEDURE — 87070 CULTURE OTHR SPECIMN AEROBIC: CPT

## 2018-03-23 PROCEDURE — 84145 PROCALCITONIN (PCT): CPT

## 2018-03-23 PROCEDURE — C1887: CPT

## 2018-03-23 PROCEDURE — 82945 GLUCOSE OTHER FLUID: CPT

## 2018-03-23 PROCEDURE — 92928 PRQ TCAT PLMT NTRAC ST 1 LES: CPT | Mod: LC

## 2018-03-23 PROCEDURE — 93321 DOPPLER ECHO F-UP/LMTD STD: CPT

## 2018-03-23 PROCEDURE — 89051 BODY FLUID CELL COUNT: CPT

## 2018-03-23 PROCEDURE — 97162 PT EVAL MOD COMPLEX 30 MIN: CPT

## 2018-03-23 PROCEDURE — 88112 CYTOPATH CELL ENHANCE TECH: CPT

## 2018-03-23 PROCEDURE — 71045 X-RAY EXAM CHEST 1 VIEW: CPT

## 2018-03-23 PROCEDURE — 87102 FUNGUS ISOLATION CULTURE: CPT

## 2018-03-23 PROCEDURE — 87205 SMEAR GRAM STAIN: CPT

## 2018-03-23 PROCEDURE — 86860 RBC ANTIBODY ELUTION: CPT

## 2018-03-23 PROCEDURE — 93320 DOPPLER ECHO COMPLETE: CPT

## 2018-03-23 PROCEDURE — C1769: CPT

## 2018-03-23 PROCEDURE — C8929: CPT

## 2018-03-23 PROCEDURE — C1892: CPT

## 2018-03-23 PROCEDURE — 99239 HOSP IP/OBS DSCHRG MGMT >30: CPT

## 2018-03-23 PROCEDURE — 87116 MYCOBACTERIA CULTURE: CPT

## 2018-03-23 PROCEDURE — 80076 HEPATIC FUNCTION PANEL: CPT

## 2018-03-23 PROCEDURE — 93458 L HRT ARTERY/VENTRICLE ANGIO: CPT

## 2018-03-23 PROCEDURE — C1730: CPT

## 2018-03-23 PROCEDURE — 83615 LACTATE (LD) (LDH) ENZYME: CPT

## 2018-03-23 PROCEDURE — 84100 ASSAY OF PHOSPHORUS: CPT

## 2018-03-23 PROCEDURE — 84132 ASSAY OF SERUM POTASSIUM: CPT

## 2018-03-23 PROCEDURE — 82728 ASSAY OF FERRITIN: CPT

## 2018-03-23 PROCEDURE — 82553 CREATINE MB FRACTION: CPT

## 2018-03-23 PROCEDURE — 82435 ASSAY OF BLOOD CHLORIDE: CPT

## 2018-03-23 PROCEDURE — 87075 CULTR BACTERIA EXCEPT BLOOD: CPT

## 2018-03-23 PROCEDURE — 83735 ASSAY OF MAGNESIUM: CPT

## 2018-03-23 PROCEDURE — 86922 COMPATIBILITY TEST ANTIGLOB: CPT

## 2018-03-23 PROCEDURE — 86870 RBC ANTIBODY IDENTIFICATION: CPT

## 2018-03-23 PROCEDURE — 84155 ASSAY OF PROTEIN SERUM: CPT

## 2018-03-23 PROCEDURE — 82947 ASSAY GLUCOSE BLOOD QUANT: CPT

## 2018-03-23 PROCEDURE — C9600: CPT | Mod: LC

## 2018-03-23 PROCEDURE — 80061 LIPID PANEL: CPT

## 2018-03-23 PROCEDURE — 84443 ASSAY THYROID STIM HORMONE: CPT

## 2018-03-23 PROCEDURE — 87338 HPYLORI STOOL AG IA: CPT

## 2018-03-23 PROCEDURE — 93005 ELECTROCARDIOGRAM TRACING: CPT

## 2018-03-23 PROCEDURE — 85610 PROTHROMBIN TIME: CPT

## 2018-03-23 PROCEDURE — 85730 THROMBOPLASTIN TIME PARTIAL: CPT

## 2018-03-23 PROCEDURE — 76376 3D RENDER W/INTRP POSTPROCES: CPT

## 2018-03-23 PROCEDURE — 83986 ASSAY PH BODY FLUID NOS: CPT

## 2018-03-23 PROCEDURE — 83550 IRON BINDING TEST: CPT

## 2018-03-23 PROCEDURE — 84157 ASSAY OF PROTEIN OTHER: CPT

## 2018-03-23 PROCEDURE — 85014 HEMATOCRIT: CPT

## 2018-03-23 PROCEDURE — 94002 VENT MGMT INPAT INIT DAY: CPT

## 2018-03-23 PROCEDURE — 83605 ASSAY OF LACTIC ACID: CPT

## 2018-03-23 PROCEDURE — 82330 ASSAY OF CALCIUM: CPT

## 2018-03-23 PROCEDURE — 84295 ASSAY OF SERUM SODIUM: CPT

## 2018-03-23 PROCEDURE — 93325 DOPPLER ECHO COLOR FLOW MAPG: CPT

## 2018-03-23 PROCEDURE — 82042 OTHER SOURCE ALBUMIN QUAN EA: CPT

## 2018-03-23 PROCEDURE — 76937 US GUIDE VASCULAR ACCESS: CPT

## 2018-03-23 PROCEDURE — 80202 ASSAY OF VANCOMYCIN: CPT

## 2018-03-23 PROCEDURE — 87015 SPECIMEN INFECT AGNT CONCNTJ: CPT

## 2018-03-23 PROCEDURE — 94640 AIRWAY INHALATION TREATMENT: CPT

## 2018-03-23 PROCEDURE — C1777: CPT

## 2018-03-23 PROCEDURE — 93312 ECHO TRANSESOPHAGEAL: CPT

## 2018-03-23 PROCEDURE — 33249 INSJ/RPLCMT DEFIB W/LEAD(S): CPT

## 2018-03-23 PROCEDURE — 86901 BLOOD TYPING SEROLOGIC RH(D): CPT

## 2018-03-23 PROCEDURE — 86880 COOMBS TEST DIRECT: CPT

## 2018-03-23 RX ORDER — SPIRONOLACTONE 25 MG/1
1 TABLET, FILM COATED ORAL
Qty: 0 | Refills: 0 | COMMUNITY

## 2018-03-23 RX ORDER — ACARBOSE 25 MG/1
1 TABLET ORAL
Qty: 0 | Refills: 0 | COMMUNITY

## 2018-03-23 RX ORDER — HEPARIN SODIUM 5000 [USP'U]/ML
2500 INJECTION INTRAVENOUS; SUBCUTANEOUS EVERY 6 HOURS
Qty: 0 | Refills: 0 | Status: DISCONTINUED | OUTPATIENT
Start: 2018-03-23 | End: 2018-03-23

## 2018-03-23 RX ORDER — OMEPRAZOLE 10 MG/1
1 CAPSULE, DELAYED RELEASE ORAL
Qty: 0 | Refills: 0 | COMMUNITY

## 2018-03-23 RX ORDER — HEPARIN SODIUM 5000 [USP'U]/ML
INJECTION INTRAVENOUS; SUBCUTANEOUS
Qty: 25000 | Refills: 0 | Status: DISCONTINUED | OUTPATIENT
Start: 2018-03-23 | End: 2018-03-23

## 2018-03-23 RX ORDER — FUROSEMIDE 40 MG
1 TABLET ORAL
Qty: 30 | Refills: 0 | OUTPATIENT
Start: 2018-03-23 | End: 2018-04-21

## 2018-03-23 RX ORDER — ASPIRIN/CALCIUM CARB/MAGNESIUM 324 MG
1 TABLET ORAL
Qty: 30 | Refills: 0 | OUTPATIENT
Start: 2018-03-23 | End: 2018-04-21

## 2018-03-23 RX ORDER — ATORVASTATIN CALCIUM 80 MG/1
1 TABLET, FILM COATED ORAL
Qty: 30 | Refills: 0 | OUTPATIENT
Start: 2018-03-23 | End: 2018-04-21

## 2018-03-23 RX ORDER — NIFEDIPINE 30 MG
1 TABLET, EXTENDED RELEASE 24 HR ORAL
Qty: 0 | Refills: 0 | COMMUNITY

## 2018-03-23 RX ORDER — HEPARIN SODIUM 5000 [USP'U]/ML
5500 INJECTION INTRAVENOUS; SUBCUTANEOUS EVERY 6 HOURS
Qty: 0 | Refills: 0 | Status: DISCONTINUED | OUTPATIENT
Start: 2018-03-23 | End: 2018-03-23

## 2018-03-23 RX ORDER — CARVEDILOL PHOSPHATE 80 MG/1
1 CAPSULE, EXTENDED RELEASE ORAL
Qty: 60 | Refills: 0 | OUTPATIENT
Start: 2018-03-23 | End: 2018-04-21

## 2018-03-23 RX ADMIN — CLOPIDOGREL BISULFATE 75 MILLIGRAM(S): 75 TABLET, FILM COATED ORAL at 11:28

## 2018-03-23 RX ADMIN — LOTEPREDNOL ETABONATE 1 DROP(S): 2 SUSPENSION/ DROPS OPHTHALMIC at 05:51

## 2018-03-23 RX ADMIN — Medication 81 MILLIGRAM(S): at 11:26

## 2018-03-23 RX ADMIN — Medication 1 DROP(S): at 05:50

## 2018-03-23 RX ADMIN — Medication 2: at 12:22

## 2018-03-23 RX ADMIN — Medication 1 DROP(S): at 11:25

## 2018-03-23 RX ADMIN — Medication 325 MILLIGRAM(S): at 11:28

## 2018-03-23 RX ADMIN — BACITRACIN 1 APPLICATION(S): 500 OINTMENT OPHTHALMIC at 11:25

## 2018-03-23 RX ADMIN — Medication 100 MILLIGRAM(S): at 05:53

## 2018-03-23 RX ADMIN — Medication 1 TABLET(S): at 14:07

## 2018-03-23 RX ADMIN — BRIMONIDINE TARTRATE 1 DROP(S): 2 SOLUTION/ DROPS OPHTHALMIC at 05:50

## 2018-03-23 RX ADMIN — PANTOPRAZOLE SODIUM 40 MILLIGRAM(S): 20 TABLET, DELAYED RELEASE ORAL at 05:51

## 2018-03-23 RX ADMIN — Medication 500 MILLIGRAM(S): at 11:26

## 2018-03-23 RX ADMIN — Medication 1 TABLET(S): at 05:50

## 2018-03-23 RX ADMIN — HEPARIN SODIUM 5000 UNIT(S): 5000 INJECTION INTRAVENOUS; SUBCUTANEOUS at 05:51

## 2018-03-23 RX ADMIN — DORZOLAMIDE HYDROCHLORIDE TIMOLOL MALEATE 1 DROP(S): 20; 5 SOLUTION/ DROPS OPHTHALMIC at 05:51

## 2018-03-23 RX ADMIN — CARVEDILOL PHOSPHATE 6.25 MILLIGRAM(S): 80 CAPSULE, EXTENDED RELEASE ORAL at 05:50

## 2018-03-23 NOTE — PROGRESS NOTE ADULT - ATTENDING COMMENTS
Patient is seen and examined with fellow, NP and the CCU house-staff. I agree with the history, physical and the assessment and plan.  aflutter with slow ventricular response with no hemodynamic compromise  BP is stable  will obtain TTE and decide if further ischemic evaluation is needed prior to possible device  all AVN blockers are on hold
Patient seen and examined. Agree with assessment and plan as outlined above.  83 yo M with atrial flutter with slow ventricular response with paroxysmal VT now status-post ICD placement. Patient intubated for airway protection during the ICD placement. No VT since ICD placement. Start coreg for BP/HR control. Plan for extubation today. On plavix. Not on anti-coagulation secondary to history of GI bleeding.
Patient seen and examined. Agree with assessment and plan as outlined above.  83 yo M with hx of vascular disease with congestive heart failure in the setting of atrial flutter with slow ventricular response with recurrent polymorphic ventricular tachycardia with preserved LV systolic function. Patient for cardiac catheterization and PPM to follow. Discussed in detail with Dr. Jaramillo. Start beta-blocker, possible amiodarone following PPM placement.
Chronic R pleural effusion with associated rounded atelectasis, pleural fluid analysis is exudative based on Light's criteria. However, the serum:pleural albumin gradient is >1.2 and serum:pleural protein gradient is >3.1, which suggests chronic CHF. Continue diuresis as tolerated. F/up pleural fluid cytopathology.    Hold off on bronchoscopy at this time. Will need repeat CT chest in 2 months to follow-up R pleural effusion and atelectasis and also to follow-up the ZORAIDA lung nodule, especially given smoking history. Needs close outpatient pulmonary follow-up.
No pulmonary contraindication to proceed with planned RADHA.     He has a chronic R pleural effusion with associated rounded atelectasis, pleural fluid analysis is exudative based on Light's criteria. However, the serum:pleural albumin gradient is >1.2 and serum:pleural protein gradient is >3.1, which suggests chronic CHF. Continue diuresis as tolerated. F/up pleural fluid cytopathology.    Needs repeat CT chest in 2 months to follow-up R pleural effusion and atelectasis and also to follow-up the ZORAIDA lung nodule, especially given smoking history. Needs close outpatient pulmonary follow-up.
I have spoken with the EP team ( [place] [id number]) who states that based on the CHADSVasc-2 score of 7, patient is to be started on anticoagulation therapy which should have been initiated on 3/22/18.  Patient is a high risk for bleed including GI bleed.  Patient was seen by the GI specialist who recommend anticoagulation with no need for EGD.  We will start Heparin drip and closely monitor patient for any sign of bleeding and closely monitor the PTT/ INR.  We will then initiate Coumadin and once therapeutic in the range of 2-3 then patient will be d/c home.  Fall precautions.        Maren Barbauerre  Hospitalist   [phone]
Patient seen and examined.  Agree with resident note.  Meds, labs and vitals all reviewed.  Patient with complicated medical history, s/p CCU stay. Currently being treated for CHF, with IV Lasix.  Awaiting CT chest to better evaluate lungs.   Cardiology, GI following.  Continue with current medical care as above.
Patient seen and examined.  Agree with resident note.  Please see detailed note as above.
Patient is s/p Thoracentesis today / Awaiting on fluid analysis results/ Pt is on Zosyn/Vanco for possible aspiration as per EP/ Will get Vanco through levels before 4th dose / close monitoring of renal function/ f/up on GI recommendation.       Maren Layne  Hospitalist   [phone]

## 2018-03-23 NOTE — CHART NOTE - NSCHARTNOTESELECT_GEN_ALL_CORE
Medicine Accept Note
CCU Midnight rounds/Event Note
Event Note
Event Note/CCU Midnight Rounds
Transfer Note
Transfer Note

## 2018-03-23 NOTE — PROGRESS NOTE ADULT - PROBLEM SELECTOR PLAN 7
DVT PPx: HSQ  Bowel: Senna   Diet: NPO for RADHA DVT PPx: HSQ  Bowel: Senna   Diet: DASH consistent carb DVT PPx: HSQ  Bowel: Senna   Diet: DASH consistent carb  Dispo: home with no PT needs

## 2018-03-23 NOTE — PROGRESS NOTE ADULT - PROVIDER SPECIALTY LIST ADULT
CCU
CCU
Electrophysiology
Gastroenterology
Internal Medicine
Pulmonology
Pulmonology
CCU
Internal Medicine
Internal Medicine

## 2018-03-23 NOTE — PROGRESS NOTE ADULT - ASSESSMENT
82 year old man  Maltese speaking history of  DMT2, HTN, R endarterectomy (7/2017) c/b bleed, partial SBO, admission 10/2017 with septic shock and respiratory failure c/ chest tube and pleurodesis, admitted Jan 2018 for anemia s/p emergent EGD (hg of 5.4) with evidence duodenal ulcers/duodenitis/sessile duodenal polyp w/ elevated troponins, renal failure, CXR concerning for persistent RLL process and effusion, paroxysmal Afib/flutter presenting from OSH as CCU transfer for ADHF in setting of ?hypertensive emergency c/b aflutter with variable block/junctional escape and polymorphic VT now s/p dual chamber AICD without further episodes of VT. s/p LHC with 85% stenosis of proximal cx and  of pRCA without intervention. s/p PCI FARRAH (3/21) x 1 to pLCx 80% via RFA access.   Pending [facility] for Watchman evaluation. 82 year old man  Swazi speaking history of  DMT2, HTN, R endarterectomy (7/2017) c/b bleed, partial SBO, admission 10/2017 with septic shock and respiratory failure c/ chest tube and pleurodesis, admitted Jan 2018 for anemia s/p emergent EGD (hg of 5.4) with evidence duodenal ulcers/duodenitis/sessile duodenal polyp w/ elevated troponins, renal failure, CXR concerning for persistent RLL process and effusion, paroxysmal Afib/flutter presenting from OSH as CCU transfer for ADHF in setting of ?hypertensive emergency c/b aflutter with variable block/junctional escape and polymorphic VT now s/p dual chamber AICD without further episodes of VT. s/p LHC with 85% stenosis of proximal cx and  of pRCA without intervention. s/p PCI FARRAH (3/21) x 1 to pLCx 80% via RFA access. [facility] 3/23 for watchman evaluation, will proceed with evaluation as outpt per EP.

## 2018-03-23 NOTE — PROGRESS NOTE ADULT - PROBLEM SELECTOR PLAN 3
CHADSVasc Score 7. Rate well controlled AV paced 60s-90s. On ASA.  -Per GI, no scope this admission, no contraindication to DAPT or coumadin. Will likely start Coumadin outpatient.   Pending [facility] for evaluation of Watchman. CHADSVasc Score 7. Rate well controlled AV paced 60s-90s. On ASA.  -Per GI, no scope this admission, no contraindication to DAPT or coumadin. [facility] 3/22 for evaluation of Watchman, will be completed as outpt.  -pending whether initiate coumadin inpatient or outpatient

## 2018-03-23 NOTE — PROGRESS NOTE ADULT - PROBLEM SELECTOR PLAN 6
BP controlled  Continue Carvedilol to 6.25 BID  Will restart home BP meds at BP 150s. BP controlled  Continue Carvedilol to 6.25 BID, lasix 40 po daily  Will restart home BP meds at BP 150s.

## 2018-03-23 NOTE — PROGRESS NOTE ADULT - PROBLEM SELECTOR PROBLEM 4
R/O Lung nodule
Atrial flutter, unspecified type
Coronary artery disease involving native coronary artery, angina presence unspecified, unspecified whether native or transplanted heart
R/O Lung nodule
Atrial flutter, unspecified type

## 2018-03-23 NOTE — PROGRESS NOTE ADULT - PROBLEM SELECTOR PROBLEM 3
Atrial flutter, unspecified type
Acute nonintractable headache, unspecified headache type
Atrial flutter, unspecified type
Pleural effusion, right
Polymorphic ventricular tachycardia
Pleural effusion, right

## 2018-03-23 NOTE — PHYSICAL THERAPY INITIAL EVALUATION ADULT - ADDITIONAL COMMENTS
Pt is legally blind, multiple recent hospital admissions, ambulates holding on to wife or home health aide. Pt has home health aide 5 hours per day during the week and 3 hours per day on the weekend. Pt's wife assists all other times. Pt does not use assistive device due to visual impairment. Pt has no steps to enter +elevator building.

## 2018-03-23 NOTE — PHYSICAL THERAPY INITIAL EVALUATION ADULT - PERTINENT HX OF CURRENT PROBLEM, REHAB EVAL
Pt s a 82 year old man with R sided endarterectomy [7/2017], GI bleed s/p colonic tumor removal in 12/2017, a.fib / flutter, presented to OSH with dyspnea -- found to be bradycardiac with a. flutter - variable block. s/p AICD on 3/16,  His respiratory status as significantly improved with diuresis also s/p thoracentesis on 3/19. Pt also s/p cath and PCI on 3/21 to the circ.

## 2018-03-23 NOTE — PROGRESS NOTE ADULT - PROBLEM SELECTOR PROBLEM 2
Pleural effusion, right
Atrial flutter, unspecified type
Essential hypertension
Pleural effusion, right
Ventricular tachycardia
Essential hypertension

## 2018-03-23 NOTE — PROGRESS NOTE ADULT - PROBLEM SELECTOR PROBLEM 7
Prophylactic measure
Coronary artery disease involving native coronary artery, angina presence unspecified, unspecified whether native or transplanted heart
PAULIE (acute kidney injury)
Prophylactic measure
PAULIE (acute kidney injury)

## 2018-03-23 NOTE — PROGRESS NOTE ADULT - SUBJECTIVE AND OBJECTIVE BOX
Shiela Thomas, PGY1  Internal Medicine team 3  Pager [phone] / [phone]    Patient is a 82y old  Male who presents with a chief complaint of Shortness of breath (19 Mar 2018 18:10)    SUBJECTIVE / OVERNIGHT EVENTS: RAUDEL overnight.      MEDICATIONS  (STANDING):  amoxicillin  875 milliGRAM(s)/clavulanate 1 Tablet(s) Oral two times a day  artificial  tears Solution 1 Drop(s) Both EYES three times a day  ascorbic acid 500 milliGRAM(s) Oral daily  aspirin  chewable 81 milliGRAM(s) Oral daily  atorvastatin 40 milliGRAM(s) Oral at bedtime  BACItracin   Ophthalmic Ointment 1 Application(s) Right EYE daily  brimonidine 0.2% Ophthalmic Solution 1 Drop(s) Right EYE two times a day  carvedilol 6.25 milliGRAM(s) Oral every 12 hours  clopidogrel Tablet 75 milliGRAM(s) Oral daily  dextrose 5%. 1000 milliLiter(s) (50 mL/Hr) IV Continuous <Continuous>  dextrose 50% Injectable 12.5 Gram(s) IV Push once  dextrose 50% Injectable 25 Gram(s) IV Push once  dextrose 50% Injectable 25 Gram(s) IV Push once  docusate sodium 100 milliGRAM(s) Oral two times a day  dorzolamide 2%/timolol 0.5% Ophthalmic Solution 1 Drop(s) Both EYES two times a day  ferrous    sulfate 325 milliGRAM(s) Oral daily  heparin  Injectable 5000 Unit(s) SubCutaneous every 12 hours  insulin glargine Injectable (LANTUS) 3 Unit(s) SubCutaneous at bedtime  insulin lispro (HumaLOG) corrective regimen sliding scale   SubCutaneous at bedtime  insulin lispro (HumaLOG) corrective regimen sliding scale   SubCutaneous three times a day before meals  loteprednol 0.5% Ophthalmic Suspension 1 Drop(s) Both EYES two times a day  pantoprazole    Tablet 40 milliGRAM(s) Oral two times a day  polyethylene glycol 3350 17 Gram(s) Oral daily  senna 2 Tablet(s) Oral at bedtime  sodium chloride 0.9%. 1000 milliLiter(s) (50 mL/Hr) IV Continuous <Continuous>  tamsulosin 0.4 milliGRAM(s) Oral daily    MEDICATIONS  (PRN):  acetaminophen   Tablet. 650 milliGRAM(s) Oral every 6 hours PRN Mild and/or moderate and/or severe pain  ALBUTerol/ipratropium for Nebulization 3 milliLiter(s) Nebulizer every 6 hours PRN Shortness of Breath and/or Wheezing  dextrose Gel 1 Dose(s) Oral once PRN Blood Glucose LESS THAN 70 milliGRAM(s)/deciliter  glucagon  Injectable 1 milliGRAM(s) IntraMuscular once PRN Glucose LESS THAN 70 milligrams/deciliter  zolpidem 5 milliGRAM(s) Oral at bedtime PRN Insomnia    Vital Signs Last 24 Hrs  T(C): 36.6 (23 Mar 2018 04:45), Max: 36.6 (23 Mar 2018 04:45)  T(F): 97.8 (23 Mar 2018 04:45), Max: 97.8 (23 Mar 2018 04:45)  HR: 71 (23 Mar 2018 04:45) (71 - 78)  BP: 136/50 (23 Mar 2018 04:45) (129/85 - 136/50)  BP(mean): --  RR: 18 (23 Mar 2018 04:45) (18 - 19)  SpO2: 97% (23 Mar 2018 04:45) (95% - 97%)    CAPILLARY BLOOD GLUCOSE      POCT Blood Glucose.: 121 mg/dL (23 Mar 2018 07:45)  POCT Blood Glucose.: 185 mg/dL (22 Mar 2018 21:17)  POCT Blood Glucose.: 109 mg/dL (22 Mar 2018 16:17)    I&O's Summary    22 Mar 2018 07:01  -  23 Mar 2018 07:00  --------------------------------------------------------  IN: 240 mL / OUT: 750 mL / NET: -510 mL        PHYSICAL EXAM  GENERAL: NAD, well-developed  HEAD:  Atraumatic  EYES: EOMI, PERRL, decreased vision b/l at baseline. Conjunctivae clear  NECK: Supple, No JVD  CHEST/LUNG: faint LLL rales. No wheezes or rhonchi. Poor inspiratory effort  HEART: Regular rate and rhythm; No murmurs, rubs, or gallops  ABDOMEN: Soft, Nontender, Nondistended; Bowel sounds present  EXTREMITIES:  2+ Peripheral Pulses, No clubbing, cyanosis, or edema  NEUROLOGY: A&O x3  SKIN: No rashes or lesions      LABS:                        9.2    9.90  )-----------( 372      ( 22 Mar 2018 07:36 )             30.8     03-23    142  |  108  |  30<H>  ----------------------------<  110<H>  4.4   |  22  |  1.30    Ca    8.7      23 Mar 2018 05:56  Phos  3.6     03-22  Mg     2.2     03-23                RADIOLOGY & ADDITIONAL TESTS:     MICROBIOLOGY:     CONSULTS: Shiela Thomas, PGY1  Internal Medicine team 3  Pager [phone] / [phone]    Patient is a 82y old  Male who presents with a chief complaint of Shortness of breath (19 Mar 2018 18:10)    SUBJECTIVE / OVERNIGHT EVENTS: RAUDEL overnight. Pt has no complaints. Sitting upright eating in chair, breathing on RA.    MEDICATIONS  (STANDING):  amoxicillin  875 milliGRAM(s)/clavulanate 1 Tablet(s) Oral two times a day  artificial  tears Solution 1 Drop(s) Both EYES three times a day  ascorbic acid 500 milliGRAM(s) Oral daily  aspirin  chewable 81 milliGRAM(s) Oral daily  atorvastatin 40 milliGRAM(s) Oral at bedtime  BACItracin   Ophthalmic Ointment 1 Application(s) Right EYE daily  brimonidine 0.2% Ophthalmic Solution 1 Drop(s) Right EYE two times a day  carvedilol 6.25 milliGRAM(s) Oral every 12 hours  clopidogrel Tablet 75 milliGRAM(s) Oral daily  dextrose 5%. 1000 milliLiter(s) (50 mL/Hr) IV Continuous <Continuous>  dextrose 50% Injectable 12.5 Gram(s) IV Push once  dextrose 50% Injectable 25 Gram(s) IV Push once  dextrose 50% Injectable 25 Gram(s) IV Push once  docusate sodium 100 milliGRAM(s) Oral two times a day  dorzolamide 2%/timolol 0.5% Ophthalmic Solution 1 Drop(s) Both EYES two times a day  ferrous    sulfate 325 milliGRAM(s) Oral daily  heparin  Injectable 5000 Unit(s) SubCutaneous every 12 hours  insulin glargine Injectable (LANTUS) 3 Unit(s) SubCutaneous at bedtime  insulin lispro (HumaLOG) corrective regimen sliding scale   SubCutaneous at bedtime  insulin lispro (HumaLOG) corrective regimen sliding scale   SubCutaneous three times a day before meals  loteprednol 0.5% Ophthalmic Suspension 1 Drop(s) Both EYES two times a day  pantoprazole    Tablet 40 milliGRAM(s) Oral two times a day  polyethylene glycol 3350 17 Gram(s) Oral daily  senna 2 Tablet(s) Oral at bedtime  sodium chloride 0.9%. 1000 milliLiter(s) (50 mL/Hr) IV Continuous <Continuous>  tamsulosin 0.4 milliGRAM(s) Oral daily    MEDICATIONS  (PRN):  acetaminophen   Tablet. 650 milliGRAM(s) Oral every 6 hours PRN Mild and/or moderate and/or severe pain  ALBUTerol/ipratropium for Nebulization 3 milliLiter(s) Nebulizer every 6 hours PRN Shortness of Breath and/or Wheezing  dextrose Gel 1 Dose(s) Oral once PRN Blood Glucose LESS THAN 70 milliGRAM(s)/deciliter  glucagon  Injectable 1 milliGRAM(s) IntraMuscular once PRN Glucose LESS THAN 70 milligrams/deciliter  zolpidem 5 milliGRAM(s) Oral at bedtime PRN Insomnia    Vital Signs Last 24 Hrs  T(C): 36.6 (23 Mar 2018 04:45), Max: 36.6 (23 Mar 2018 04:45)  T(F): 97.8 (23 Mar 2018 04:45), Max: 97.8 (23 Mar 2018 04:45)  HR: 71 (23 Mar 2018 04:45) (71 - 78)  BP: 136/50 (23 Mar 2018 04:45) (129/85 - 136/50)  BP(mean): --  RR: 18 (23 Mar 2018 04:45) (18 - 19)  SpO2: 97% (23 Mar 2018 04:45) (95% - 97%)    CAPILLARY BLOOD GLUCOSE      POCT Blood Glucose.: 121 mg/dL (23 Mar 2018 07:45)  POCT Blood Glucose.: 185 mg/dL (22 Mar 2018 21:17)  POCT Blood Glucose.: 109 mg/dL (22 Mar 2018 16:17)    I&O's Summary    22 Mar 2018 07:01  -  23 Mar 2018 07:00  --------------------------------------------------------  IN: 240 mL / OUT: 750 mL / NET: -510 mL        PHYSICAL EXAM  GENERAL: NAD, well-developed  HEAD:  Atraumatic  EYES: EOMI, PERRL, decreased vision b/l at baseline. Conjunctivae clear  NECK: Supple, No JVD  CHEST/LUNG: faint LLL rales. No wheezes or rhonchi. Poor inspiratory effort  HEART: Regular rate and rhythm; No murmurs, rubs, or gallops  ABDOMEN: Soft, Nontender, Nondistended; Bowel sounds present  EXTREMITIES:  2+ Peripheral Pulses, No clubbing, cyanosis, or edema  NEUROLOGY: A&O x3  SKIN: No rashes or lesions      LABS:                        9.2    9.90  )-----------( 372      ( 22 Mar 2018 07:36 )             30.8     CBC Full  -  ( 22 Mar 2018 07:36 )  WBC Count : 9.90 K/uL  Hemoglobin : 9.2 g/dL  Hematocrit : 30.8 %  Platelet Count - Automated : 372 K/uL  Mean Cell Volume : 85.1 fl  Mean Cell Hemoglobin : 25.4 pg  Mean Cell Hemoglobin Concentration : 29.9 gm/dL  Auto Neutrophil # : x  Auto Lymphocyte # : x  Auto Monocyte # : x  Auto Eosinophil # : x  Auto Basophil # : x  Auto Neutrophil % : x  Auto Lymphocyte % : x  Auto Monocyte % : x  Auto Eosinophil % : x  Auto Basophil % : x    03-23    142  |  108  |  30<H>  ----------------------------<  110<H>  4.4   |  22  |  1.30    Ca    8.7      23 Mar 2018 05:56  Phos  3.6     03-22  Mg     2.2     03-23      Creatinine Trend: 1.30<--, 1.34<--, 1.44<--, 1.52<--, 1.38<--, 1.41<--      MICROBIOLOGY:    CONSULTS: Shiela Thomas, PGY1  Internal Medicine team 3  Pager [phone] / [phone]    Patient is a 82y old  Male who presents with a chief complaint of Shortness of breath (19 Mar 2018 18:10)    SUBJECTIVE / OVERNIGHT EVENTS: RAUDEL overnight. Pt has no complaints. Sitting upright eating in chair, breathing on RA. Wife and pt would like to go home today.    MEDICATIONS  (STANDING):  amoxicillin  875 milliGRAM(s)/clavulanate 1 Tablet(s) Oral two times a day  artificial  tears Solution 1 Drop(s) Both EYES three times a day  ascorbic acid 500 milliGRAM(s) Oral daily  aspirin  chewable 81 milliGRAM(s) Oral daily  atorvastatin 40 milliGRAM(s) Oral at bedtime  BACItracin   Ophthalmic Ointment 1 Application(s) Right EYE daily  brimonidine 0.2% Ophthalmic Solution 1 Drop(s) Right EYE two times a day  carvedilol 6.25 milliGRAM(s) Oral every 12 hours  clopidogrel Tablet 75 milliGRAM(s) Oral daily  dextrose 5%. 1000 milliLiter(s) (50 mL/Hr) IV Continuous <Continuous>  dextrose 50% Injectable 12.5 Gram(s) IV Push once  dextrose 50% Injectable 25 Gram(s) IV Push once  dextrose 50% Injectable 25 Gram(s) IV Push once  docusate sodium 100 milliGRAM(s) Oral two times a day  dorzolamide 2%/timolol 0.5% Ophthalmic Solution 1 Drop(s) Both EYES two times a day  ferrous    sulfate 325 milliGRAM(s) Oral daily  heparin  Injectable 5000 Unit(s) SubCutaneous every 12 hours  insulin glargine Injectable (LANTUS) 3 Unit(s) SubCutaneous at bedtime  insulin lispro (HumaLOG) corrective regimen sliding scale   SubCutaneous at bedtime  insulin lispro (HumaLOG) corrective regimen sliding scale   SubCutaneous three times a day before meals  loteprednol 0.5% Ophthalmic Suspension 1 Drop(s) Both EYES two times a day  pantoprazole    Tablet 40 milliGRAM(s) Oral two times a day  polyethylene glycol 3350 17 Gram(s) Oral daily  senna 2 Tablet(s) Oral at bedtime  sodium chloride 0.9%. 1000 milliLiter(s) (50 mL/Hr) IV Continuous <Continuous>  tamsulosin 0.4 milliGRAM(s) Oral daily    MEDICATIONS  (PRN):  acetaminophen   Tablet. 650 milliGRAM(s) Oral every 6 hours PRN Mild and/or moderate and/or severe pain  ALBUTerol/ipratropium for Nebulization 3 milliLiter(s) Nebulizer every 6 hours PRN Shortness of Breath and/or Wheezing  dextrose Gel 1 Dose(s) Oral once PRN Blood Glucose LESS THAN 70 milliGRAM(s)/deciliter  glucagon  Injectable 1 milliGRAM(s) IntraMuscular once PRN Glucose LESS THAN 70 milligrams/deciliter  zolpidem 5 milliGRAM(s) Oral at bedtime PRN Insomnia    Vital Signs Last 24 Hrs  T(C): 36.6 (23 Mar 2018 04:45), Max: 36.6 (23 Mar 2018 04:45)  T(F): 97.8 (23 Mar 2018 04:45), Max: 97.8 (23 Mar 2018 04:45)  HR: 71 (23 Mar 2018 04:45) (71 - 78)  BP: 136/50 (23 Mar 2018 04:45) (129/85 - 136/50)  BP(mean): --  RR: 18 (23 Mar 2018 04:45) (18 - 19)  SpO2: 97% (23 Mar 2018 04:45) (95% - 97%)    CAPILLARY BLOOD GLUCOSE      POCT Blood Glucose.: 121 mg/dL (23 Mar 2018 07:45)  POCT Blood Glucose.: 185 mg/dL (22 Mar 2018 21:17)  POCT Blood Glucose.: 109 mg/dL (22 Mar 2018 16:17)    I&O's Summary    22 Mar 2018 07:01  -  23 Mar 2018 07:00  --------------------------------------------------------  IN: 240 mL / OUT: 750 mL / NET: -510 mL        PHYSICAL EXAM  GENERAL: NAD, well-developed  HEAD:  Atraumatic  EYES: EOMI, PERRL, decreased vision b/l at baseline. Conjunctivae clear  NECK: Supple, No JVD  CHEST/LUNG: faint LLL rales. No wheezes or rhonchi. Poor inspiratory effort  HEART: Regular rate and rhythm; No murmurs, rubs, or gallops  ABDOMEN: Soft, Nontender, Nondistended; Bowel sounds present  EXTREMITIES:  2+ Peripheral Pulses, No clubbing, cyanosis, or edema  NEUROLOGY: A&O x3  SKIN: No rashes or lesions      LABS:                        9.2    9.90  )-----------( 372      ( 22 Mar 2018 07:36 )             30.8     CBC Full  -  ( 22 Mar 2018 07:36 )  WBC Count : 9.90 K/uL  Hemoglobin : 9.2 g/dL  Hematocrit : 30.8 %  Platelet Count - Automated : 372 K/uL  Mean Cell Volume : 85.1 fl  Mean Cell Hemoglobin : 25.4 pg  Mean Cell Hemoglobin Concentration : 29.9 gm/dL  Auto Neutrophil # : x  Auto Lymphocyte # : x  Auto Monocyte # : x  Auto Eosinophil # : x  Auto Basophil # : x  Auto Neutrophil % : x  Auto Lymphocyte % : x  Auto Monocyte % : x  Auto Eosinophil % : x  Auto Basophil % : x    03-23    142  |  108  |  30<H>  ----------------------------<  110<H>  4.4   |  22  |  1.30    Ca    8.7      23 Mar 2018 05:56  Phos  3.6     03-22  Mg     2.2     03-23      Creatinine Trend: 1.30<--, 1.34<--, 1.44<--, 1.52<--, 1.38<--, 1.41<--      MICROBIOLOGY:    CONSULTS:

## 2018-03-23 NOTE — PROGRESS NOTE ADULT - PROBLEM SELECTOR PROBLEM 6
Essential hypertension
Coronary artery disease involving native coronary artery, angina presence unspecified, unspecified whether native or transplanted heart
Essential hypertension
R/O Lung nodule
Coronary artery disease involving native coronary artery, angina presence unspecified, unspecified whether native or transplanted heart

## 2018-03-23 NOTE — PROGRESS NOTE ADULT - PROBLEM SELECTOR PROBLEM 5
Coronary artery disease involving native coronary artery, angina presence unspecified, unspecified whether native or transplanted heart
Coronary artery disease involving native coronary artery, angina presence unspecified, unspecified whether native or transplanted heart
Duodenal ulcer
Pleural effusion
Duodenal ulcer

## 2018-03-23 NOTE — PROGRESS NOTE ADULT - PROBLEM SELECTOR PLAN 2
Patient Moderate loculated pleural effusion s/p thoracentesis 3/19. 600cc serous fluid removed. Lights Criteria + exudative effusion. Follow up outpatient with pulm   Continue Augmentin BID for presumed PNA. Patient Moderate loculated pleural effusion s/p thoracentesis 3/19. 600cc serous fluid removed. Lights Criteria + exudative effusion, neg for malignant cells/AFB/PMN cells, cultures pending. Follow up outpatient with pulm   Continue Augmentin BID for presumed PNA (5d course EOT 3/23).

## 2018-03-23 NOTE — PROGRESS NOTE ADULT - PROBLEM SELECTOR PLAN 1
BNP 5000s showing improvement, lungs clear, breathing on RA.  Continue lasix 40 po daily  Strict I/Os and daily weights.  Continue PRN duonebs

## 2018-03-23 NOTE — CHART NOTE - NSCHARTNOTEFT_GEN_A_CORE
Mauritanian  #[id number]    Per discussion with EP, plan was to start heparin bridge to coumadin. Through , told patient and wife at bedside that we recommend he stay in the hospital to start a blood thinner. Wife and patient want to go home today. They both verbally confirmed they understand the risk of pt leaving without anticoagulation are inc risk of stroke or death. They prefer to discuss with their PMD Dr. Muller and home cardiologist Dr. Cabrera about starting coumadin. Their priority is going home now. Nurse Anahi Herrera RN at bedside as witness to conversation. [place]ese  #[id number]    Per discussion with EP, plan was to start heparin bridge to coumadin for atrial fibrillation (CHADSVasc Score 7). Through , told patient and wife at bedside that we recommend he stay in the hospital to start a blood thinner. Wife and patient want to go home today. They both verbally confirmed they understand the risk of pt leaving without anticoagulation are inc risk of stroke or death. They prefer to discuss with their PMD Dr. Muller and home cardiologist Dr. Cabrera about starting coumadin. Their priority is going home now. Nurse Anahi Herrera RN at bedside as witness to conversation.

## 2018-03-24 LAB
CULTURE RESULTS: SIGNIFICANT CHANGE UP
SPECIMEN SOURCE: SIGNIFICANT CHANGE UP

## 2018-03-26 LAB
COMMENT - FLUIDS: SIGNIFICANT CHANGE UP

## 2018-04-18 LAB
CULTURE RESULTS: SIGNIFICANT CHANGE UP
SPECIMEN SOURCE: SIGNIFICANT CHANGE UP

## 2018-05-09 LAB
CULTURE RESULTS: SIGNIFICANT CHANGE UP
SPECIMEN SOURCE: SIGNIFICANT CHANGE UP

## 2018-09-14 NOTE — PROGRESS NOTE ADULT - PROBLEM SELECTOR PROBLEM 8
record. Findings: -Normal esophagus. Mild diffuse non-erosive gastritis, biopsies done to r/o H pylori. Normal examined duodenum to second portion, biopsies done to r/o celiac disease. Specimens: Was Obtained: bottle A, random duodenal biopsies, second portion of duodenum  Bottle B, gastritis, gastric body and antrum    Complications: None    Colonoscopy    Procedure Details  Informed consent was obtained for the procedure, including sedation. Risks of perforation, hemorrhage, adverse drug reaction and aspiration were discussed. The patient was placed in the left lateral decubitus position. Based on the pre-procedure assessment, including review of the patient's medical history, medications, allergies, and review of systems, she had been deemed to be an appropriate candidate for conscious sedation; she was therefore sedated with the medications listed below. The patient was monitored continuously with ECG tracing, pulse oximetry, blood pressure monitoring, and direct observations. rectal examination was performed. The colonoscope was inserted into the rectum and advanced under direct vision to the cecum, which was identified by the ileocecal valve and appendiceal orifice. The quality of the colonic preparation was good. A careful inspection was made as the colonoscope was withdrawn, including a retroflexed view of the rectum; findings and interventions are described below. Appropriate photodocumentation was obtained. Patient tolerated the procedure well. Findings: -Mild sigmoid diverticulosis, tortuous sigmoid colon. One 5 mm cecal sessile polyp removed with cold forceps. Random colon biopsies done in the colon to rule out microscopic colitis. The terminal ileum is normal.  Small internal hemorrhoids. - Anesthesia issues: no    Specimens: Was Obtained: bottle C, sessile polyp, cecum  Bottle D, random colon biopsies, r/o microscopic colitis.     Complications:   None      Disposition:   PACU - hemodynamically stable. Impression:   -Colonoscopy - Mild sigmoid diverticulosis, tortuous sigmoid colon. One 5 mm cecal sessile polyp removed with cold forceps. Random colon biopsies done in the colon to rule out microscopic colitis. The terminal ileum is normal.    - EGD -Normal esophagus. Mild diffuse non-erosive gastritis, biopsies done to r/o H pylori. Normal examined duodenum to second portion, biopsies done to r/o celiac disease. Recommendations:  -Await pathology. - Continue fiber and Colestid for diarrhea. No major pathology on EGD and colonoscopy.          Tera Mercado 9/14/18 10:51 AM Leukocytosis, unspecified type Polymorphic ventricular tachycardia

## 2018-11-09 PROBLEM — E11.9 TYPE 2 DIABETES MELLITUS WITHOUT COMPLICATIONS: Chronic | Status: ACTIVE | Noted: 2018-03-14

## 2018-11-09 PROBLEM — I10 ESSENTIAL (PRIMARY) HYPERTENSION: Chronic | Status: ACTIVE | Noted: 2018-03-14

## 2018-11-09 PROBLEM — Z00.00 ENCOUNTER FOR PREVENTIVE HEALTH EXAMINATION: Status: ACTIVE | Noted: 2018-11-09

## 2018-11-09 PROBLEM — I48.92 UNSPECIFIED ATRIAL FLUTTER: Chronic | Status: ACTIVE | Noted: 2018-03-14

## 2018-11-27 ENCOUNTER — APPOINTMENT (OUTPATIENT)
Dept: CV DIAGNOSTICS | Facility: HOSPITAL | Age: 83
End: 2018-11-27

## 2018-11-27 ENCOUNTER — OUTPATIENT (OUTPATIENT)
Dept: OUTPATIENT SERVICES | Facility: HOSPITAL | Age: 83
LOS: 1 days | End: 2018-11-27
Payer: MEDICARE

## 2018-11-27 DIAGNOSIS — Z98.890 OTHER SPECIFIED POSTPROCEDURAL STATES: Chronic | ICD-10-CM

## 2018-11-27 DIAGNOSIS — I25.10 ATHEROSCLEROTIC HEART DISEASE OF NATIVE CORONARY ARTERY WITHOUT ANGINA PECTORIS: ICD-10-CM

## 2018-11-27 DIAGNOSIS — K63.5 POLYP OF COLON: Chronic | ICD-10-CM

## 2018-11-27 PROCEDURE — 78452 HT MUSCLE IMAGE SPECT MULT: CPT | Mod: 26

## 2018-11-27 PROCEDURE — A9505: CPT

## 2018-11-27 PROCEDURE — 78452 HT MUSCLE IMAGE SPECT MULT: CPT

## 2018-11-27 PROCEDURE — A9500: CPT

## 2018-11-27 PROCEDURE — 93018 CV STRESS TEST I&R ONLY: CPT

## 2018-11-27 PROCEDURE — 93017 CV STRESS TEST TRACING ONLY: CPT

## 2018-11-27 PROCEDURE — 93016 CV STRESS TEST SUPVJ ONLY: CPT

## 2021-07-29 ENCOUNTER — APPOINTMENT (OUTPATIENT)
Dept: CV DIAGNOSTICS | Facility: HOSPITAL | Age: 86
End: 2021-07-29

## 2022-05-26 NOTE — PROGRESS NOTE ADULT - PROBLEM SELECTOR PLAN 1
Sarecycline Counseling: Patient advised regarding possible photosensitivity and discoloration of the teeth, skin, lips, tongue and gums.  Patient instructed to avoid sunlight, if possible.  When exposed to sunlight, patients should wear protective clothing, sunglasses, and sunscreen.  The patient was instructed to call the office immediately if the following severe adverse effects occur:  hearing changes, easy bruising/bleeding, severe headache, or vision changes.  The patient verbalized understanding of the proper use and possible adverse effects of sarecycline.  All of the patient's questions and concerns were addressed. Azelaic Acid Pregnancy And Lactation Text: This medication is considered safe during pregnancy and breast feeding. Isotretinoin Pregnancy And Lactation Text: This medication is Pregnancy Category X and is considered extremely dangerous during pregnancy. It is unknown if it is excreted in breast milk. BNP 5000s showing improvement, lungs clear, breathing on RA.  Continue lasix 40 po daily  Strict I/Os and daily weights.  Continue PRN duonebs Tetracycline Counseling: Patient counseled regarding possible photosensitivity and increased risk for sunburn.  Patient instructed to avoid sunlight, if possible.  When exposed to sunlight, patients should wear protective clothing, sunglasses, and sunscreen.  The patient was instructed to call the office immediately if the following severe adverse effects occur:  hearing changes, easy bruising/bleeding, severe headache, or vision changes.  The patient verbalized understanding of the proper use and possible adverse effects of tetracycline.  All of the patient's questions and concerns were addressed. Patient understands to avoid pregnancy while on therapy due to potential birth defects. Topical Sulfur Applications Counseling: Topical Sulfur Counseling: Patient counseled that this medication may cause skin irritation or allergic reactions.  In the event of skin irritation, the patient was advised to reduce the amount of the drug applied or use it less frequently.   The patient verbalized understanding of the proper use and possible adverse effects of topical sulfur application.  All of the patient's questions and concerns were addressed. Topical Retinoid Pregnancy And Lactation Text: This medication is Pregnancy Category C. It is unknown if this medication is excreted in breast milk. Azithromycin Counseling:  I discussed with the patient the risks of azithromycin including but not limited to GI upset, allergic reaction, drug rash, diarrhea, and yeast infections. Tazorac Counseling:  Patient advised that medication is irritating and drying.  Patient may need to apply sparingly and wash off after an hour before eventually leaving it on overnight.  The patient verbalized understanding of the proper use and possible adverse effects of tazorac.  All of the patient's questions and concerns were addressed. Doxycycline Pregnancy And Lactation Text: This medication is Pregnancy Category D and not consider safe during pregnancy. It is also excreted in breast milk but is considered safe for shorter treatment courses. Birth Control Pills Counseling: Birth Control Pill Counseling: I discussed with the patient the potential side effects of OCPs including but not limited to increased risk of stroke, heart attack, thrombophlebitis, deep venous thrombosis, hepatic adenomas, breast changes, GI upset, headaches, and depression.  The patient verbalized understanding of the proper use and possible adverse effects of OCPs. All of the patient's questions and concerns were addressed. Erythromycin Counseling:  I discussed with the patient the risks of erythromycin including but not limited to GI upset, allergic reaction, drug rash, diarrhea, increase in liver enzymes, and yeast infections. Topical Sulfur Applications Pregnancy And Lactation Text: This medication is Pregnancy Category C and has an unknown safety profile during pregnancy. It is unknown if this topical medication is excreted in breast milk. Birth Control Pills Pregnancy And Lactation Text: This medication should be avoided if pregnant and for the first 30 days post-partum. Sarecycline Pregnancy And Lactation Text: This medication is Pregnancy Category D and not consider safe during pregnancy. It is also excreted in breast milk. High Dose Vitamin A Counseling: Side effects reviewed, pt to contact office should one occur. Use Enhanced Medication Counseling?: No Benzoyl Peroxide Counseling: Patient counseled that medicine may cause skin irritation and bleach clothing.  In the event of skin irritation, the patient was advised to reduce the amount of the drug applied or use it less frequently.   The patient verbalized understanding of the proper use and possible adverse effects of benzoyl peroxide.  All of the patient's questions and concerns were addressed. Aklief counseling:  Patient advised to apply a pea-sized amount only at bedtime and wait 30 minutes after washing their face before applying.  If too drying, patient may add a non-comedogenic moisturizer.  The most commonly reported side effects including irritation, redness, scaling, dryness, stinging, burning, itching, and increased risk of sunburn.  The patient verbalized understanding of the proper use and possible adverse effects of retinoids.  All of the patient's questions and concerns were addressed. Tazorac Pregnancy And Lactation Text: This medication is not safe during pregnancy. It is unknown if this medication is excreted in breast milk. Azithromycin Pregnancy And Lactation Text: This medication is considered safe during pregnancy and is also secreted in breast milk. Benzoyl Peroxide Pregnancy And Lactation Text: This medication is Pregnancy Category C. It is unknown if benzoyl peroxide is excreted in breast milk. Bactrim Counseling:  I discussed with the patient the risks of sulfa antibiotics including but not limited to GI upset, allergic reaction, drug rash, diarrhea, dizziness, photosensitivity, and yeast infections.  Rarely, more serious reactions can occur including but not limited to aplastic anemia, agranulocytosis, methemoglobinemia, blood dyscrasias, liver or kidney failure, lung infiltrates or desquamative/blistering drug rashes. Erythromycin Pregnancy And Lactation Text: This medication is Pregnancy Category B and is considered safe during pregnancy. It is also excreted in breast milk. Dapsone Counseling: I discussed with the patient the risks of dapsone including but not limited to hemolytic anemia, agranulocytosis, rashes, methemoglobinemia, kidney failure, peripheral neuropathy, headaches, GI upset, and liver toxicity.  Patients who start dapsone require monitoring including baseline LFTs and weekly CBCs for the first month, then every month thereafter.  The patient verbalized understanding of the proper use and possible adverse effects of dapsone.  All of the patient's questions and concerns were addressed. Winlevi Counseling:  I discussed with the patient the risks of topical clascoterone including but not limited to erythema, scaling, itching, and stinging. Patient voiced their understanding. High Dose Vitamin A Pregnancy And Lactation Text: High dose vitamin A therapy is contraindicated during pregnancy and breast feeding. Dapsone Pregnancy And Lactation Text: This medication is Pregnancy Category C and is not considered safe during pregnancy or breast feeding. Minocycline Counseling: Patient advised regarding possible photosensitivity and discoloration of the teeth, skin, lips, tongue and gums.  Patient instructed to avoid sunlight, if possible.  When exposed to sunlight, patients should wear protective clothing, sunglasses, and sunscreen.  The patient was instructed to call the office immediately if the following severe adverse effects occur:  hearing changes, easy bruising/bleeding, severe headache, or vision changes.  The patient verbalized understanding of the proper use and possible adverse effects of minocycline.  All of the patient's questions and concerns were addressed. Aklief Pregnancy And Lactation Text: It is unknown if this medication is safe to use during pregnancy.  It is unknown if this medication is excreted in breast milk.  Breastfeeding women should use the topical cream on the smallest area of the skin for the shortest time needed while breastfeeding.  Do not apply to nipple and areola. Spironolactone Counseling: Patient advised regarding risks of diarrhea, abdominal pain, hyperkalemia, birth defects (for female patients), liver toxicity and renal toxicity. The patient may need blood work to monitor liver and kidney function and potassium levels while on therapy. The patient verbalized understanding of the proper use and possible adverse effects of spironolactone.  All of the patient's questions and concerns were addressed. Topical Clindamycin Counseling: Patient counseled that this medication may cause skin irritation or allergic reactions.  In the event of skin irritation, the patient was advised to reduce the amount of the drug applied or use it less frequently.   The patient verbalized understanding of the proper use and possible adverse effects of clindamycin.  All of the patient's questions and concerns were addressed. Patient Specific Counseling (Will Not Stick From Patient To Patient): -Discussed possible winlevi at the next visit\\n-Discussed accutane in the future rather than doxycycline if fails topicals Bactrim Pregnancy And Lactation Text: This medication is Pregnancy Category D and is known to cause fetal risk.  It is also excreted in breast milk. Topical Clindamycin Pregnancy And Lactation Text: This medication is Pregnancy Category B and is considered safe during pregnancy. It is unknown if it is excreted in breast milk. Spironolactone Pregnancy And Lactation Text: This medication can cause feminization of the male fetus and should be avoided during pregnancy. The active metabolite is also found in breast milk. Topical Retinoid counseling:  Patient advised to apply a pea-sized amount only at bedtime and wait 30 minutes after washing their face before applying.  If too drying, patient may add a non-comedogenic moisturizer. The patient verbalized understanding of the proper use and possible adverse effects of retinoids.  All of the patient's questions and concerns were addressed. Detail Level: Detailed Winlevi Pregnancy And Lactation Text: This medication is considered safe during pregnancy and breastfeeding. Doxycycline Counseling:  Patient counseled regarding possible photosensitivity and increased risk for sunburn.  Patient instructed to avoid sunlight, if possible.  When exposed to sunlight, patients should wear protective clothing, sunglasses, and sunscreen.  The patient was instructed to call the office immediately if the following severe adverse effects occur:  hearing changes, easy bruising/bleeding, severe headache, or vision changes.  The patient verbalized understanding of the proper use and possible adverse effects of doxycycline.  All of the patient's questions and concerns were addressed. Isotretinoin Counseling: Patient should get monthly blood tests, not donate blood, not drive at night if vision affected, not share medication, and not undergo elective surgery for 6 months after tx completed. Side effects reviewed, pt to contact office should one occur. Azelaic Acid Counseling: Patient counseled that medicine may cause skin irritation and to avoid applying near the eyes.  In the event of skin irritation, the patient was advised to reduce the amount of the drug applied or use it less frequently.   The patient verbalized understanding of the proper use and possible adverse effects of azelaic acid.  All of the patient's questions and concerns were addressed.

## 2022-11-07 NOTE — PROGRESS NOTE ADULT - ASSESSMENT
81 yo [place]an speaking M PMHx DMT2, HTN, R endarterectomy (7/2017) c/b bleed, partial SBO, admission 10/2017 with septic shock and respiratory failure c/ chest tube and pleurodesis, admitted Jan 2018 for anemia s/p emergent EGD (hg of 5.4) with evidence duodenal ulcers/duodenitis/sessile duodenal polyp w/ elevated troponins, renal failure, CXR concerning for persistent RLL process and effusion, paroxysmal Afib/flutter presenting from OSH as CCU transfer for ADHF in setting of ?hypertensive emergency c/b aflutter with variable block/junctional escape and polymorphic VT now s/p dual chamber AICD without further episodes of VT. s/p LHC with 85% stenosis of proximal cx and  of pRCA without intervention. Kb Subramanian(Attending)

## 2023-01-10 NOTE — PHYSICAL THERAPY INITIAL EVALUATION ADULT - PHYSICAL ASSIST/NONPHYSICAL ASSIST, REHAB EVAL
Has The Growth Been Previously Biopsied?: has been previously biopsied
Body Location Override (Optional): Right chest
1 person assist

## 2023-06-09 NOTE — H&P ADULT - RESPIRATORY AND THORAX
The left coronary artery was selectively engaged and injected. Multiple views of the injected vessel were taken. details…

## 2023-09-26 NOTE — PROCEDURE NOTE - ATTENDING PROVIDER
Dr. Tyler Arava Counseling:  Patient counseled regarding adverse effects of Arava including but not limited to nausea, vomiting, abnormalities in liver function tests. Patients may develop mouth sores, rash, diarrhea, and abnormalities in blood counts. The patient understands that monitoring is required including LFTs and blood counts.  There is a rare possibility of scarring of the liver and lung problems that can occur when taking methotrexate. Persistent nausea, loss of appetite, pale stools, dark urine, cough, and shortness of breath should be reported immediately. Patient advised to discontinue Arava treatment and consult with a physician prior to attempting conception. The patient will have to undergo a treatment to eliminate Arava from the body prior to conception.

## 2024-12-02 NOTE — CONSULT NOTE ADULT - CONSULT REASON
Heart wires removed. Tips intact. Rhythm stable.   Yamini White, NP     complete heart block, atrial flutter, polymorphic ventricular tachycardia

## 2025-02-24 NOTE — PROGRESS NOTE ADULT - SUBJECTIVE AND OBJECTIVE BOX
CHIEF COMPLAINT: dyspnea; PPM placement     Interval Events:     REVIEW OF SYSTEMS:  Constitutional: No fevers or chills. No weight loss. No fatigue or generalised malaise.  Eyes: No itching or discharge from the eyes  ENT: No ear pain. No ear discharge. No nasal congestion. No post nasal drip. No epistaxis. No throat pain. No sore throat. No difficulty swallowing.   CV: No chest pain. No palpitations. No lightheadedness or dizziness.   Resp: No dyspnea at rest. No dyspnea on exertion. No orthopnea. No wheezing. No cough. No stridor. No sputum production. No chest pain with respiration.  GI: No nausea. No vomiting. No diarrhea.  MSK: No joint pain or pain in any extremities  Integumentary: No skin lesions. No pedal edema.  Neurological: No gross motor weakness. No sensory changes.  [x] All other systems negative  [ ] Unable to assess ROS because ________    OBJECTIVE:  ICU Vital Signs Last 24 Hrs  T(C): 36.4 (22 Mar 2018 04:36), Max: 36.7 (21 Mar 2018 14:23)  T(F): 97.5 (22 Mar 2018 04:36), Max: 98.1 (21 Mar 2018 17:00)  HR: 73 (22 Mar 2018 04:36) (69 - 80)  BP: 118/66 (22 Mar 2018 04:36) (117/63 - 150/79)  RR: 18 (22 Mar 2018 04:36) (18 - 18)  SpO2: 93% (22 Mar 2018 04:36) (93% - 100%) on RA    PHYSICAL EXAM:  General: Awake, alert, oriented X 3. resting in bed. comfortable.   HEENT: Atraumatic, normocephalic.   Lymph Nodes: No palpable lymphadenopathy  Neck: No JVD. No carotid bruit.   Respiratory: Normal chest expansion. Decreased BS on the R base; crackles at the R base.  No wheezing.   Cardiovascular: S1 S2 normal. No murmurs, rubs or gallops.   Abdomen: Soft, non-tender, non-distended. No organomegaly.  Extremities: Warm to touch. Peripheral pulse palpable. No pedal edema.   Skin: No rashes or skin lesions  Neurological: Motor and sensory examination equal and normal in all four extremities.  Psychiatry: Appropriate mood and affect.      HOSPITAL MEDICATIONS:  MEDICATIONS  (STANDING):  amoxicillin  875 milliGRAM(s)/clavulanate 1 Tablet(s) Oral two times a day  artificial  tears Solution 1 Drop(s) Both EYES three times a day  ascorbic acid 500 milliGRAM(s) Oral daily  aspirin  chewable 81 milliGRAM(s) Oral daily  atorvastatin 40 milliGRAM(s) Oral at bedtime  BACItracin   Ophthalmic Ointment 1 Application(s) Right EYE daily  brimonidine 0.2% Ophthalmic Solution 1 Drop(s) Right EYE two times a day  carvedilol 6.25 milliGRAM(s) Oral every 12 hours  clopidogrel Tablet 75 milliGRAM(s) Oral daily  dextrose 5%. 1000 milliLiter(s) (50 mL/Hr) IV Continuous <Continuous>  dextrose 50% Injectable 12.5 Gram(s) IV Push once  dextrose 50% Injectable 25 Gram(s) IV Push once  dextrose 50% Injectable 25 Gram(s) IV Push once  docusate sodium 100 milliGRAM(s) Oral two times a day  dorzolamide 2%/timolol 0.5% Ophthalmic Solution 1 Drop(s) Both EYES two times a day  ferrous    sulfate 325 milliGRAM(s) Oral daily  heparin  Injectable 5000 Unit(s) SubCutaneous every 12 hours  insulin glargine Injectable (LANTUS) 3 Unit(s) SubCutaneous at bedtime  insulin lispro (HumaLOG) corrective regimen sliding scale   SubCutaneous at bedtime  insulin lispro (HumaLOG) corrective regimen sliding scale   SubCutaneous three times a day before meals  loteprednol 0.5% Ophthalmic Suspension 1 Drop(s) Both EYES two times a day  pantoprazole    Tablet 40 milliGRAM(s) Oral two times a day  polyethylene glycol 3350 17 Gram(s) Oral daily  senna 2 Tablet(s) Oral at bedtime  sodium chloride 0.9%. 1000 milliLiter(s) (50 mL/Hr) IV Continuous <Continuous>  tamsulosin 0.4 milliGRAM(s) Oral daily    MEDICATIONS  (PRN):  acetaminophen   Tablet. 650 milliGRAM(s) Oral every 6 hours PRN Mild and/or moderate and/or severe pain  ALBUTerol/ipratropium for Nebulization 3 milliLiter(s) Nebulizer every 6 hours PRN Shortness of Breath and/or Wheezing  dextrose Gel 1 Dose(s) Oral once PRN Blood Glucose LESS THAN 70 milliGRAM(s)/deciliter  glucagon  Injectable 1 milliGRAM(s) IntraMuscular once PRN Glucose LESS THAN 70 milligrams/deciliter  zolpidem 5 milliGRAM(s) Oral at bedtime PRN Insomnia      LABS:                        10.2   11.31 )-----------( 415      ( 21 Mar 2018 07:31 )             34.7     03-22    137  |  104  |  36<H>  ----------------------------<  126<H>  3.8   |  23  |  1.34<H>    Ca    8.9      22 Mar 2018 06:12  Phos  3.6     03-22  Mg     2.2     03-22      PT/INR - ( 21 Mar 2018 07:31 )   PT: 12.6 sec;   INR: 1.11 ratio         PTT - ( 21 Mar 2018 07:31 )  PTT:30.1 sec          MICROBIOLOGY:     RADIOLOGY:  [x] Reviewed and interpreted by me  CXR post drainage:   improved R pleural effusion; however still with atelectasis as his has volume loss on the R side.    Pleural Fluid:     TP 2.9   Alb: 1.5   Cx: NGTD CHIEF COMPLAINT: dyspnea; PPM placement     Interval Events:  he received a PCI yesterday in the prox circ. plan for a RADHA today. No chest pain. No dyspnea. Minimal cough last night, now resolved. No wheezing. Feels ok this AM.      REVIEW OF SYSTEMS:  Constitutional: No fevers or chills. No weight loss. No fatigue or generalised malaise.  Eyes: No itching or discharge from the eyes  ENT: No ear pain. No ear discharge. No nasal congestion. No post nasal drip. No epistaxis. No throat pain. No sore throat. No difficulty swallowing.   CV: No chest pain. No palpitations. No lightheadedness or dizziness.   Resp: see HPI  GI: No nausea. No vomiting. No diarrhea.  MSK: No joint pain or pain in any extremities  Integumentary: No skin lesions. No pedal edema.  Neurological: No gross motor weakness. No sensory changes.  [x] All other systems negative  [ ] Unable to assess ROS because ________    OBJECTIVE:  ICU Vital Signs Last 24 Hrs  T(C): 36.4 (22 Mar 2018 04:36), Max: 36.7 (21 Mar 2018 14:23)  T(F): 97.5 (22 Mar 2018 04:36), Max: 98.1 (21 Mar 2018 17:00)  HR: 73 (22 Mar 2018 04:36) (69 - 80)  BP: 118/66 (22 Mar 2018 04:36) (117/63 - 150/79)  RR: 18 (22 Mar 2018 04:36) (18 - 18)  SpO2: 93% (22 Mar 2018 04:36) (93% - 100%) on RA    PHYSICAL EXAM:  General: Awake, alert, oriented X 3. resting in bed. comfortable.   HEENT: Atraumatic, normocephalic.   Lymph Nodes: No palpable lymphadenopathy  Neck: No JVD. No carotid bruit.   Respiratory: Normal chest expansion. Decreased BS on the R base; crackles at the R base.  No wheezing.   Cardiovascular: S1 S2 normal. No murmurs, rubs or gallops.   Abdomen: Soft, non-tender, non-distended. No organomegaly.  Extremities: Warm to touch. Peripheral pulse palpable. No pedal edema.   Skin: No rashes or skin lesions  Neurological: Motor and sensory examination equal and normal in all four extremities.  Psychiatry: Appropriate mood and affect.      HOSPITAL MEDICATIONS:  MEDICATIONS  (STANDING):  amoxicillin  875 milliGRAM(s)/clavulanate 1 Tablet(s) Oral two times a day  artificial  tears Solution 1 Drop(s) Both EYES three times a day  ascorbic acid 500 milliGRAM(s) Oral daily  aspirin  chewable 81 milliGRAM(s) Oral daily  atorvastatin 40 milliGRAM(s) Oral at bedtime  BACItracin   Ophthalmic Ointment 1 Application(s) Right EYE daily  brimonidine 0.2% Ophthalmic Solution 1 Drop(s) Right EYE two times a day  carvedilol 6.25 milliGRAM(s) Oral every 12 hours  clopidogrel Tablet 75 milliGRAM(s) Oral daily  dextrose 5%. 1000 milliLiter(s) (50 mL/Hr) IV Continuous <Continuous>  dextrose 50% Injectable 12.5 Gram(s) IV Push once  dextrose 50% Injectable 25 Gram(s) IV Push once  dextrose 50% Injectable 25 Gram(s) IV Push once  docusate sodium 100 milliGRAM(s) Oral two times a day  dorzolamide 2%/timolol 0.5% Ophthalmic Solution 1 Drop(s) Both EYES two times a day  ferrous    sulfate 325 milliGRAM(s) Oral daily  heparin  Injectable 5000 Unit(s) SubCutaneous every 12 hours  insulin glargine Injectable (LANTUS) 3 Unit(s) SubCutaneous at bedtime  insulin lispro (HumaLOG) corrective regimen sliding scale   SubCutaneous at bedtime  insulin lispro (HumaLOG) corrective regimen sliding scale   SubCutaneous three times a day before meals  loteprednol 0.5% Ophthalmic Suspension 1 Drop(s) Both EYES two times a day  pantoprazole    Tablet 40 milliGRAM(s) Oral two times a day  polyethylene glycol 3350 17 Gram(s) Oral daily  senna 2 Tablet(s) Oral at bedtime  sodium chloride 0.9%. 1000 milliLiter(s) (50 mL/Hr) IV Continuous <Continuous>  tamsulosin 0.4 milliGRAM(s) Oral daily    MEDICATIONS  (PRN):  acetaminophen   Tablet. 650 milliGRAM(s) Oral every 6 hours PRN Mild and/or moderate and/or severe pain  ALBUTerol/ipratropium for Nebulization 3 milliLiter(s) Nebulizer every 6 hours PRN Shortness of Breath and/or Wheezing  dextrose Gel 1 Dose(s) Oral once PRN Blood Glucose LESS THAN 70 milliGRAM(s)/deciliter  glucagon  Injectable 1 milliGRAM(s) IntraMuscular once PRN Glucose LESS THAN 70 milligrams/deciliter  zolpidem 5 milliGRAM(s) Oral at bedtime PRN Insomnia      LABS:                        10.2   11.31 )-----------( 415      ( 21 Mar 2018 07:31 )             34.7     03-22    137  |  104  |  36<H>  ----------------------------<  126<H>  3.8   |  23  |  1.34<H>    Ca    8.9      22 Mar 2018 06:12  Phos  3.6     03-22  Mg     2.2     03-22      PT/INR - ( 21 Mar 2018 07:31 )   PT: 12.6 sec;   INR: 1.11 ratio         PTT - ( 21 Mar 2018 07:31 )  PTT:30.1 sec          MICROBIOLOGY:     RADIOLOGY:  [x] Reviewed and interpreted by me  CXR post drainage:   improved R pleural effusion; however still with atelectasis as his has volume loss on the R side.    Pleural Fluid:     TP 2.9   Alb: 1.5   Cx: NGTD 57

## 2025-03-04 NOTE — PROGRESS NOTE ADULT - PROBLEM/PLAN-9
DISPLAY PLAN FREE TEXT
General Sunscreen Counseling: I recommended a broad spectrum sunscreen with a SPF of 30 or higher.  I explained that SPF 30 sunscreens block approximately 97 percent of the sun's harmful rays.  Sunscreens should be applied at least 15 minutes prior to expected sun exposure and then every 2 hours after that as long as sun exposure continues. If swimming or exercising sunscreen should be reapplied every 45 minutes to an hour after getting wet or sweating.  One ounce, or the equivalent of a shot glass full of sunscreen, is adequate to protect the skin not covered by a bathing suit. I also recommended a lip balm with a sunscreen as well. Sun protective clothing can be used in lieu of sunscreen but must be worn the entire time you are exposed to the sun's rays.
Detail Level: Generalized
DISPLAY PLAN FREE TEXT